# Patient Record
Sex: FEMALE | Race: WHITE | Employment: FULL TIME
[De-identification: names, ages, dates, MRNs, and addresses within clinical notes are randomized per-mention and may not be internally consistent; named-entity substitution may affect disease eponyms.]

---
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
## 2018-01-11 ENCOUNTER — HOSPITAL ENCOUNTER (EMERGENCY)
Age: 18
Discharge: HOME | End: 2018-01-11
Attending: EMERGENCY MEDICINE
Payer: MEDICAID

## 2018-01-11 VITALS
OXYGEN SATURATION: 98 % | WEIGHT: 153 LBS | TEMPERATURE: 98.7 F | HEART RATE: 95 BPM | BODY MASS INDEX: 26.26 KG/M2 | SYSTOLIC BLOOD PRESSURE: 118 MMHG | DIASTOLIC BLOOD PRESSURE: 67 MMHG | RESPIRATION RATE: 16 BRPM

## 2018-01-11 DIAGNOSIS — R10.9 ABDOMINAL CRAMPING: Primary | ICD-10-CM

## 2018-01-11 LAB
-: ABNORMAL
ABSOLUTE EOS #: 0.05 K/UL (ref 0–0.44)
ABSOLUTE IMMATURE GRANULOCYTE: <0.03 K/UL (ref 0–0.3)
ABSOLUTE LYMPH #: 1.6 K/UL (ref 1.2–5.2)
ABSOLUTE MONO #: 0.55 K/UL (ref 0.1–1.4)
ALBUMIN SERPL-MCNC: 4 G/DL (ref 3.5–5.2)
ALBUMIN/GLOBULIN RATIO: 1.2 (ref 1–2.5)
ALP BLD-CCNC: 69 U/L (ref 35–104)
ALT SERPL-CCNC: 18 U/L (ref 5–33)
AMORPHOUS: ABNORMAL
ANION GAP SERPL CALCULATED.3IONS-SCNC: 14 MMOL/L (ref 9–17)
AST SERPL-CCNC: 20 U/L
BACTERIA: ABNORMAL
BASOPHILS # BLD: 0 % (ref 0–2)
BASOPHILS ABSOLUTE: <0.03 K/UL (ref 0–0.2)
BILIRUB SERPL-MCNC: 0.3 MG/DL (ref 0.3–1.2)
BILIRUBIN URINE: NEGATIVE
BUN BLDV-MCNC: 11 MG/DL (ref 6–20)
BUN/CREAT BLD: ABNORMAL (ref 9–20)
C TRACH DNA GENITAL QL NAA+PROBE: NEGATIVE
CALCIUM SERPL-MCNC: 9.8 MG/DL (ref 8.6–10.4)
CASTS UA: ABNORMAL /LPF (ref 0–2)
CHLORIDE BLD-SCNC: 101 MMOL/L (ref 98–107)
CO2: 22 MMOL/L (ref 20–31)
COLOR: YELLOW
CREAT SERPL-MCNC: 0.72 MG/DL (ref 0.5–0.9)
CRYSTALS, UA: ABNORMAL /HPF
DIFFERENTIAL TYPE: ABNORMAL
DIRECT EXAM: NORMAL
EOSINOPHILS RELATIVE PERCENT: 1 % (ref 1–4)
EPITHELIAL CELLS UA: ABNORMAL /HPF (ref 0–5)
GFR AFRICAN AMERICAN: ABNORMAL ML/MIN
GFR NON-AFRICAN AMERICAN: ABNORMAL ML/MIN
GFR SERPL CREATININE-BSD FRML MDRD: ABNORMAL ML/MIN/{1.73_M2}
GFR SERPL CREATININE-BSD FRML MDRD: ABNORMAL ML/MIN/{1.73_M2}
GLUCOSE BLD-MCNC: 83 MG/DL (ref 70–99)
GLUCOSE URINE: NEGATIVE
HCG QUALITATIVE: NEGATIVE
HCT VFR BLD CALC: 38.2 % (ref 36.3–47.1)
HEMOGLOBIN: 12.8 G/DL (ref 11.9–15.1)
IMMATURE GRANULOCYTES: 0 %
KETONES, URINE: NEGATIVE
LEUKOCYTE ESTERASE, URINE: NEGATIVE
LIPASE: 20 U/L (ref 13–60)
LYMPHOCYTES # BLD: 30 % (ref 25–45)
Lab: NORMAL
MCH RBC QN AUTO: 29.4 PG (ref 25.2–33.5)
MCHC RBC AUTO-ENTMCNC: 33.5 G/DL (ref 28.4–34.8)
MCV RBC AUTO: 87.6 FL (ref 82.6–102.9)
MONOCYTES # BLD: 10 % (ref 2–8)
MUCUS: ABNORMAL
N. GONORRHOEAE DNA: ABNORMAL
NITRITE, URINE: NEGATIVE
OTHER OBSERVATIONS UA: ABNORMAL
PDW BLD-RTO: 13.5 % (ref 11.8–14.4)
PH UA: 5 (ref 5–8)
PLATELET # BLD: 210 K/UL (ref 138–453)
PLATELET ESTIMATE: ABNORMAL
PMV BLD AUTO: 11 FL (ref 8.1–13.5)
POTASSIUM SERPL-SCNC: 3.3 MMOL/L (ref 3.7–5.3)
PROTEIN UA: NEGATIVE
RBC # BLD: 4.36 M/UL (ref 3.95–5.11)
RBC # BLD: ABNORMAL 10*6/UL
RBC UA: ABNORMAL /HPF (ref 0–2)
RENAL EPITHELIAL, UA: ABNORMAL /HPF
SEG NEUTROPHILS: 59 % (ref 34–64)
SEGMENTED NEUTROPHILS ABSOLUTE COUNT: 3.06 K/UL (ref 1.8–8)
SODIUM BLD-SCNC: 137 MMOL/L (ref 135–144)
SPECIFIC GRAVITY UA: 1.03 (ref 1–1.03)
SPECIMEN DESCRIPTION: NORMAL
STATUS: NORMAL
TOTAL PROTEIN: 7.3 G/DL (ref 6.4–8.3)
TRICHOMONAS: ABNORMAL
TURBIDITY: CLEAR
URINE HGB: NEGATIVE
UROBILINOGEN, URINE: NORMAL
WBC # BLD: 5.3 K/UL (ref 4.5–13.5)
WBC # BLD: ABNORMAL 10*3/UL
WBC UA: ABNORMAL /HPF (ref 0–5)
YEAST: ABNORMAL

## 2018-01-11 PROCEDURE — 99284 EMERGENCY DEPT VISIT MOD MDM: CPT

## 2018-01-11 PROCEDURE — 2580000003 HC RX 258: Performed by: EMERGENCY MEDICINE

## 2018-01-11 PROCEDURE — 81001 URINALYSIS AUTO W/SCOPE: CPT

## 2018-01-11 PROCEDURE — 80053 COMPREHEN METABOLIC PANEL: CPT

## 2018-01-11 PROCEDURE — 87660 TRICHOMONAS VAGIN DIR PROBE: CPT

## 2018-01-11 PROCEDURE — 87480 CANDIDA DNA DIR PROBE: CPT

## 2018-01-11 PROCEDURE — 84703 CHORIONIC GONADOTROPIN ASSAY: CPT

## 2018-01-11 PROCEDURE — 87591 N.GONORRHOEAE DNA AMP PROB: CPT

## 2018-01-11 PROCEDURE — 87491 CHLMYD TRACH DNA AMP PROBE: CPT

## 2018-01-11 PROCEDURE — 87086 URINE CULTURE/COLONY COUNT: CPT

## 2018-01-11 PROCEDURE — 87510 GARDNER VAG DNA DIR PROBE: CPT

## 2018-01-11 PROCEDURE — 83690 ASSAY OF LIPASE: CPT

## 2018-01-11 PROCEDURE — 85025 COMPLETE CBC W/AUTO DIFF WBC: CPT

## 2018-01-11 RX ORDER — 0.9 % SODIUM CHLORIDE 0.9 %
1000 INTRAVENOUS SOLUTION INTRAVENOUS ONCE
Status: COMPLETED | OUTPATIENT
Start: 2018-01-11 | End: 2018-01-11

## 2018-01-11 RX ORDER — ONDANSETRON 2 MG/ML
4 INJECTION INTRAMUSCULAR; INTRAVENOUS ONCE
Status: DISCONTINUED | OUTPATIENT
Start: 2018-01-11 | End: 2018-01-11 | Stop reason: HOSPADM

## 2018-01-11 RX ADMIN — SODIUM CHLORIDE 1000 ML: 9 INJECTION, SOLUTION INTRAVENOUS at 04:00

## 2018-01-11 ASSESSMENT — PAIN DESCRIPTION - PAIN TYPE: TYPE: ACUTE PAIN

## 2018-01-11 ASSESSMENT — PAIN DESCRIPTION - ORIENTATION: ORIENTATION: RIGHT;LEFT;LOWER

## 2018-01-11 ASSESSMENT — ENCOUNTER SYMPTOMS
DIARRHEA: 0
VOMITING: 1
BLOOD IN STOOL: 0
CONSTIPATION: 0
BACK PAIN: 0
NAUSEA: 1
COUGH: 0
ABDOMINAL PAIN: 1
SORE THROAT: 0
SHORTNESS OF BREATH: 0

## 2018-01-11 ASSESSMENT — PAIN SCALES - GENERAL: PAINLEVEL_OUTOF10: 5

## 2018-01-11 ASSESSMENT — PAIN DESCRIPTION - FREQUENCY: FREQUENCY: INTERMITTENT

## 2018-01-11 ASSESSMENT — PAIN DESCRIPTION - DESCRIPTORS: DESCRIPTORS: ACHING;CRAMPING

## 2018-01-11 ASSESSMENT — PAIN DESCRIPTION - ONSET: ONSET: ON-GOING

## 2018-01-11 ASSESSMENT — PAIN DESCRIPTION - LOCATION: LOCATION: ABDOMEN

## 2018-01-11 NOTE — ED NOTES
PT sleeping in bed. RR even and unlabored. NAD noted. Will continue to monitor.        Derick Stocko, RN  01/11/18 9921

## 2018-01-11 NOTE — ED PROVIDER NOTES
REVIEWED-NO CLINICALLY SIGNIFICANT ABNORMALITIES. IMP-ABD PAIN, INTERMITTENT, RESOLVED. PLAN-DISCHARGE. F/U WITH PHYSICIAN OR CLINIC OF CHOICE FROM LIST PROVIDED. RETURN IF SX WORSEN OR PROGRESS.              Arti Cassidy MD  01/11/18 0328
female that presents with bilateral lower abdominal cramping for the past 2 weeks associated with mild nausea and occasional vomiting. Denies any fever or chills. Denies any hematuria, dysuria, vaginal bleeding. She states she has no straight vaginal discharge. Last menstrual period over one month ago. On exam patient is well-appearing, afebrile, nontoxic. Lungs are clear bilaterally. Abdomen soft nontender. Pelvic exam performed with Francisco Pitt RN and exam for entire procedure. Pelvic exam showing yellowish-white vaginal discharge, no gross bleeding, digital exam showing no cervical motion tenderness and no adnexal tenderness and no masses. Impression is possibly vaginitis or gonorrhea chlamydia or pregnancy, we'll get vaginitis probes, gonorrhea and chlamydia swabs, pregnancy test, CBC, CMP and lipase as well as urinalysis. Plan for IV fluids and Zofran and will reassess. 5:31 AM  UA negative, Vaginitis negative. Gc/chlamydia pending, patient electing not to be treated for this. Patient will follow up on results and provided with clinic list for PCP follow up. Instructed to return if symptoms worsen. Patient verbalized understanding and in agreement with plan. PROCEDURES:  None    CONSULTS:  None    CRITICAL CARE:  None    FINAL IMPRESSION      1. Abdominal cramping          DISPOSITION / PLAN     DISPOSITION  Decision to discharge      PATIENT REFERRED TO:  [facility]  [address] [address]  [phone]    If symptoms worsen      DISCHARGE MEDICATIONS:  New Prescriptions    No medications on file       Oneyda Pollack MD  Emergency Medicine Resident    (Please note that portions of this note were completed with a voice recognition program.  Efforts were made to edit the dictations but occasionally words are mis-transcribed. )        Oneyda Pollack MD  Resident  01/12/18 7472

## 2018-01-12 LAB
CULTURE: NORMAL
CULTURE: NORMAL
Lab: NORMAL
SPECIMEN DESCRIPTION: NORMAL
STATUS: NORMAL

## 2018-01-25 ENCOUNTER — HOSPITAL ENCOUNTER (EMERGENCY)
Age: 18
Discharge: HOME | End: 2018-01-25
Attending: EMERGENCY MEDICINE
Payer: MEDICAID

## 2018-01-25 VITALS
SYSTOLIC BLOOD PRESSURE: 120 MMHG | WEIGHT: 150 LBS | BODY MASS INDEX: 25.75 KG/M2 | HEART RATE: 76 BPM | RESPIRATION RATE: 16 BRPM | TEMPERATURE: 96.6 F | OXYGEN SATURATION: 99 % | DIASTOLIC BLOOD PRESSURE: 68 MMHG

## 2018-01-25 DIAGNOSIS — H60.501 ACUTE OTITIS EXTERNA OF RIGHT EAR, UNSPECIFIED TYPE: Primary | ICD-10-CM

## 2018-01-25 DIAGNOSIS — H73.011 BULLOUS MYRINGITIS OF RIGHT EAR: ICD-10-CM

## 2018-01-25 PROCEDURE — 99283 EMERGENCY DEPT VISIT LOW MDM: CPT

## 2018-01-25 PROCEDURE — 6370000000 HC RX 637 (ALT 250 FOR IP): Performed by: EMERGENCY MEDICINE

## 2018-01-25 RX ORDER — AMOXICILLIN 500 MG/1
500 CAPSULE ORAL 3 TIMES DAILY
Qty: 30 CAPSULE | Refills: 0 | Status: SHIPPED | OUTPATIENT
Start: 2018-01-25 | End: 2018-02-04

## 2018-01-25 RX ORDER — IBUPROFEN 800 MG/1
800 TABLET ORAL ONCE
Status: COMPLETED | OUTPATIENT
Start: 2018-01-25 | End: 2018-01-25

## 2018-01-25 RX ORDER — IBUPROFEN 600 MG/1
600 TABLET ORAL EVERY 6 HOURS PRN
Qty: 30 TABLET | Refills: 0 | Status: SHIPPED | OUTPATIENT
Start: 2018-01-25 | End: 2018-07-18 | Stop reason: ALTCHOICE

## 2018-01-25 RX ORDER — AMOXICILLIN 250 MG/1
500 CAPSULE ORAL ONCE
Status: COMPLETED | OUTPATIENT
Start: 2018-01-25 | End: 2018-01-25

## 2018-01-25 RX ORDER — CIPROFLOXACIN AND DEXAMETHASONE 3; 1 MG/ML; MG/ML
4 SUSPENSION/ DROPS AURICULAR (OTIC) ONCE
Status: COMPLETED | OUTPATIENT
Start: 2018-01-25 | End: 2018-01-25

## 2018-01-25 RX ADMIN — CIPROFLOXACIN AND DEXAMETHASONE 4 DROP: 3; 1 SUSPENSION/ DROPS AURICULAR (OTIC) at 08:31

## 2018-01-25 RX ADMIN — AMOXICILLIN 500 MG: 250 CAPSULE ORAL at 08:31

## 2018-01-25 RX ADMIN — IBUPROFEN 800 MG: 800 TABLET, FILM COATED ORAL at 08:31

## 2018-01-25 ASSESSMENT — PAIN DESCRIPTION - LOCATION: LOCATION: EAR

## 2018-01-25 ASSESSMENT — ENCOUNTER SYMPTOMS
NAUSEA: 0
ABDOMINAL PAIN: 0
VOMITING: 0
COUGH: 1
SORE THROAT: 1
RHINORRHEA: 1
TROUBLE SWALLOWING: 0
VOICE CHANGE: 0
SHORTNESS OF BREATH: 0

## 2018-01-25 ASSESSMENT — PAIN DESCRIPTION - PROGRESSION: CLINICAL_PROGRESSION: GRADUALLY WORSENING

## 2018-01-25 ASSESSMENT — PAIN DESCRIPTION - FREQUENCY: FREQUENCY: CONTINUOUS

## 2018-01-25 ASSESSMENT — PAIN SCALES - GENERAL
PAINLEVEL_OUTOF10: 10
PAINLEVEL_OUTOF10: 10

## 2018-01-25 ASSESSMENT — PAIN DESCRIPTION - PAIN TYPE: TYPE: ACUTE PAIN

## 2018-01-25 ASSESSMENT — PAIN DESCRIPTION - ORIENTATION: ORIENTATION: RIGHT

## 2018-01-25 ASSESSMENT — PAIN DESCRIPTION - ONSET: ONSET: GRADUAL

## 2018-01-25 NOTE — ED PROVIDER NOTES
[facility]     Emergency Department     Faculty Attestation    I performed a history and physical examination of the patient and discussed management with the resident. I reviewed the residents note and agree with the documented findings and plan of care. Any areas of disagreement are noted on the chart. I was personally present for the key portions of any procedures. I have documented in the chart those procedures where I was not present during the key portions. I have reviewed the emergency nurses triage note. I agree with the chief complaint, past medical history, past surgical history, allergies, medications, social and family history as documented unless otherwise noted below. For Physician Assistant/ Nurse Practitioner cases/documentation I have personally evaluated this patient and have completed at least one if not all key elements of the E/M (history, physical exam, and MDM). Additional findings are as noted. Primary Care Physician:  No primary care provider on file.     CHIEF COMPLAINT       Chief Complaint   Patient presents with    Otalgia       RECENT VITALS:   Temp: 96.6 °F (35.9 °C),  Heart Rate: 76, Resp: 16,      LABS:  Labs Reviewed - No data to display      PERTINENT ATTENDING PHYSICIAN COMMENTS:    Patient with right ear pain exam consistent with otitis media and bullous type she also has some tenderness to the tragus itself is no tenderness over the mastoid     [address],  MD, [facility]  Attending Emergency  Physician              Warden Dale MD  01/25/18 7191

## 2018-07-18 ENCOUNTER — HOSPITAL ENCOUNTER (EMERGENCY)
Age: 18
Discharge: HOME | End: 2018-07-18
Attending: EMERGENCY MEDICINE
Payer: MEDICAID

## 2018-07-18 VITALS
DIASTOLIC BLOOD PRESSURE: 64 MMHG | HEIGHT: 63 IN | RESPIRATION RATE: 18 BRPM | TEMPERATURE: 98.2 F | SYSTOLIC BLOOD PRESSURE: 106 MMHG | HEART RATE: 77 BPM | OXYGEN SATURATION: 99 % | BODY MASS INDEX: 26.58 KG/M2 | WEIGHT: 150 LBS

## 2018-07-18 DIAGNOSIS — N89.8 VAGINAL DISCHARGE: Primary | ICD-10-CM

## 2018-07-18 DIAGNOSIS — B96.89 BV (BACTERIAL VAGINOSIS): ICD-10-CM

## 2018-07-18 DIAGNOSIS — N76.0 BV (BACTERIAL VAGINOSIS): ICD-10-CM

## 2018-07-18 DIAGNOSIS — Z20.2 POSSIBLE EXPOSURE TO STD: ICD-10-CM

## 2018-07-18 LAB
BILIRUBIN URINE: NEGATIVE
COLOR: YELLOW
COMMENT UA: NORMAL
DIRECT EXAM: ABNORMAL
GLUCOSE URINE: NEGATIVE
HCG(URINE) PREGNANCY TEST: NEGATIVE
KETONES, URINE: NEGATIVE
LEUKOCYTE ESTERASE, URINE: NEGATIVE
Lab: ABNORMAL
NITRITE, URINE: NEGATIVE
PH UA: 6 (ref 5–8)
PROTEIN UA: NEGATIVE
SPECIFIC GRAVITY UA: 1.03 (ref 1–1.03)
SPECIMEN DESCRIPTION: ABNORMAL
STATUS: ABNORMAL
TURBIDITY: CLEAR
URINE HGB: NEGATIVE
UROBILINOGEN, URINE: NORMAL

## 2018-07-18 PROCEDURE — 96372 THER/PROPH/DIAG INJ SC/IM: CPT

## 2018-07-18 PROCEDURE — 87591 N.GONORRHOEAE DNA AMP PROB: CPT

## 2018-07-18 PROCEDURE — 84703 CHORIONIC GONADOTROPIN ASSAY: CPT

## 2018-07-18 PROCEDURE — 87491 CHLMYD TRACH DNA AMP PROBE: CPT

## 2018-07-18 PROCEDURE — 87660 TRICHOMONAS VAGIN DIR PROBE: CPT

## 2018-07-18 PROCEDURE — 87480 CANDIDA DNA DIR PROBE: CPT

## 2018-07-18 PROCEDURE — 99283 EMERGENCY DEPT VISIT LOW MDM: CPT

## 2018-07-18 PROCEDURE — 81003 URINALYSIS AUTO W/O SCOPE: CPT

## 2018-07-18 PROCEDURE — 6370000000 HC RX 637 (ALT 250 FOR IP): Performed by: PHYSICIAN ASSISTANT

## 2018-07-18 PROCEDURE — 6360000002 HC RX W HCPCS: Performed by: PHYSICIAN ASSISTANT

## 2018-07-18 PROCEDURE — 87510 GARDNER VAG DNA DIR PROBE: CPT

## 2018-07-18 RX ORDER — CEFTRIAXONE SODIUM 250 MG/1
250 INJECTION, POWDER, FOR SOLUTION INTRAMUSCULAR; INTRAVENOUS ONCE
Status: COMPLETED | OUTPATIENT
Start: 2018-07-18 | End: 2018-07-18

## 2018-07-18 RX ORDER — METRONIDAZOLE 500 MG/1
500 TABLET ORAL 2 TIMES DAILY
Qty: 14 TABLET | Refills: 0 | Status: SHIPPED | OUTPATIENT
Start: 2018-07-18 | End: 2018-07-25

## 2018-07-18 RX ORDER — AZITHROMYCIN 250 MG/1
1000 TABLET, FILM COATED ORAL ONCE
Status: COMPLETED | OUTPATIENT
Start: 2018-07-18 | End: 2018-07-18

## 2018-07-18 RX ADMIN — CEFTRIAXONE SODIUM 250 MG: 250 INJECTION, POWDER, FOR SOLUTION INTRAMUSCULAR; INTRAVENOUS at 16:33

## 2018-07-18 RX ADMIN — AZITHROMYCIN 1000 MG: 250 TABLET, FILM COATED ORAL at 16:33

## 2018-07-18 ASSESSMENT — PAIN SCALES - GENERAL: PAINLEVEL_OUTOF10: 7

## 2018-07-18 ASSESSMENT — ENCOUNTER SYMPTOMS
SORE THROAT: 0
EYE PAIN: 0
BACK PAIN: 0
EYE DISCHARGE: 0
NAUSEA: 0
EYE ITCHING: 0
WHEEZING: 0
COUGH: 0
ABDOMINAL PAIN: 0
RHINORRHEA: 0
COLOR CHANGE: 0
VOMITING: 0

## 2018-07-18 ASSESSMENT — PAIN DESCRIPTION - ORIENTATION: ORIENTATION: LOWER;MID

## 2018-07-18 ASSESSMENT — PAIN DESCRIPTION - DESCRIPTORS: DESCRIPTORS: SHARP

## 2018-07-18 ASSESSMENT — PAIN DESCRIPTION - LOCATION: LOCATION: ABDOMEN

## 2018-07-18 ASSESSMENT — PAIN DESCRIPTION - FREQUENCY: FREQUENCY: INTERMITTENT

## 2018-07-18 NOTE — ED PROVIDER NOTES
[facility]  ED  eMERGENCY dEPARTMENT eNCOUnter   [facility]     Pt Name: Jose Marie  MRN: [id number]  [place] 2000  Date of evaluation: 7/18/18       Jose Marie is a 21 y.o. female who presents with Vaginal Discharge (reports white vaginal discharge x 1 week)      Vitals:   Vitals:    07/18/18 1406 07/18/18 1407   BP:  106/64   Pulse: 77    Resp: 18    Temp: 98.2 °F (36.8 °C)    TempSrc: Oral    SpO2: 99%    Weight: 150 lb (68 kg)    Height: 5' 3\" (1.6 m)        Impression: No diagnosis found. I was personally available for consultation in the Emergency Department. I have reviewed the chart and agree with the documentation as recorded by the [facility] AND [facility], including the assessment, treatment plan and disposition.     Betsey Leon MD  Attending Emergency  Physician              Betsey Leon MD  07/18/18 8044

## 2018-07-18 NOTE — ED NOTES
Writer provided patient information on [facility] JOSE Umanzor, Autumn.       Claudia Ferrera, SAMANTHA, LSW  07/18/18 1522

## 2018-07-18 NOTE — ED NOTES
Discharge instructions given. Verbalized understanding.   No distress noted     Milagro Small, JJ  07/18/18 2913

## 2018-07-19 LAB
C TRACH DNA GENITAL QL NAA+PROBE: NEGATIVE
N. GONORRHOEAE DNA: NEGATIVE

## 2018-07-19 NOTE — ED PROVIDER NOTES
[facility] ED  Emergency Department Encounter  Mid Level Provider     Pt Name: Ruthe Bosworth  MRN: [id number]  [place] 2000  Date of evaluation: 7/18/18  PCP:  No primary care provider on file. CHIEF COMPLAINT       Chief Complaint   Patient presents with    Vaginal Discharge     reports white vaginal discharge x 1 week       HISTORY OF PRESENT ILLNESS  (Location/Symptom, Timing/Onset, Context/Setting, Quality, Duration, Modifying Factors, Severity.)      Ruthe Bosworth is a 21 y.o. female who presents with White vaginal discharge. Patient reports that she has a concern for sexual transmitted disease. Patient reports that she has one male sexual partner but states that her partner may have other partners. She denies any dysuria urgency or frequency. She denies any recent antibiotic use. She has not had any fevers or chills. She denies any abdominal pain. She reports that her last menstrual cycle was at the end of June and regular and on time for her. PAST MEDICAL / SURGICAL / SOCIAL / FAMILY HISTORY      has a past medical history of Pelvic inflammatory disease. has a past surgical history that includes fracture surgery; Tympanostomy tube placement; Tonsillectomy; Adenoidectomy; and Appendectomy. Social History     Social History    Marital status: Single     Spouse name: N/A    Number of children: N/A    Years of education: N/A     Occupational History    Not on file. Social History Main Topics    Smoking status: Never Smoker    Smokeless tobacco: Never Used    Alcohol use No    Drug use: No    Sexual activity: Yes     Partners: Male     Other Topics Concern    Not on file     Social History Narrative    No narrative on file       History reviewed. No pertinent family history. Allergies:  Patient has no known allergies. Home Medications:  Prior to Admission medications    Medication Sig Start Date End Date Taking?  Authorizing Provider NEGATIVE for Candida sp. Direct Exam POSITIVE for Gardnerella vaginalis. (A)     Direct Exam NEGATIVE for Trichomonas vaginalis     Direct Exam       Method of testing is a DNA probe intended for detection and identification of    Direct Exam        Candida species, Gardnerella vaginalis, and Trichomonas vaginalis nucleic acid    Direct Exam        in vaginal fluid specimens from patients with symptoms of vaginitis/vaginosis. Status FINAL 07/18/2018    Urinalysis   Result Value Ref Range    Color, UA YELLOW YEL    Turbidity UA CLEAR CLEAR    Glucose, Ur NEGATIVE NEG    Bilirubin Urine NEGATIVE NEG    Ketones, Urine NEGATIVE NEG    Specific Gravity, UA 1.026 1.005 - 1.030    Urine Hgb NEGATIVE NEG    pH, UA 6.0 5.0 - 8.0    Protein, UA NEGATIVE NEG    Urobilinogen, Urine Normal NORM    Nitrite, Urine NEGATIVE NEG    Leukocyte Esterase, Urine NEGATIVE NEG    Urinalysis Comments       Microscopic exam not performed based on chemical results unless requested in   Pregnancy, Urine   Result Value Ref Range    HCG(Urine) Pregnancy Test NEGATIVE NEG           FINAL IMPRESSION      1. Vaginal discharge    2.  Possible exposure to STD    3. BV (bacterial vaginosis)          DISPOSITION / PLAN     DISPOSITION Decision To Discharge    PATIENT REFERRED TO:  [facility] [address] [address] [phone]  Schedule an appointment as soon as possible for a visit         DISCHARGE MEDICATIONS:  Discharge Medication List as of 7/18/2018  4:27 PM      START taking these medications    Details   metroNIDAZOLE (FLAGYL) 500 MG tablet Take 1 tablet by mouth 2 times daily for 7 days, Disp-14 tablet, R-0Normal             Shadia Michel PA-C   Emergency Medicine Physician Assistant    (Please note that portions of this note were completed with a voice recognition program.  Efforts were made to edit the dictations but occasionally words are mis-transcribed.)       Shadia Michel

## 2018-09-26 PROBLEM — J10.1 INFLUENZA A: Status: RESOLVED | Noted: 2017-05-27 | Resolved: 2018-09-26

## 2019-08-07 ENCOUNTER — APPOINTMENT (OUTPATIENT)
Dept: ULTRASOUND IMAGING | Age: 19
DRG: 566 | End: 2019-08-07
Payer: MEDICAID

## 2019-08-07 ENCOUNTER — HOSPITAL ENCOUNTER (INPATIENT)
Age: 19
LOS: 1 days | Discharge: HOME | DRG: 566 | End: 2019-08-08
Attending: EMERGENCY MEDICINE | Admitting: OBSTETRICS & GYNECOLOGY
Payer: MEDICAID

## 2019-08-07 DIAGNOSIS — N73.0 PID (ACUTE PELVIC INFLAMMATORY DISEASE): Primary | ICD-10-CM

## 2019-08-07 DIAGNOSIS — O26.859 SPOTTING IN EARLY PREGNANCY: ICD-10-CM

## 2019-08-07 LAB
-: ABNORMAL
ABSOLUTE EOS #: <0.03 K/UL (ref 0–0.44)
ABSOLUTE IMMATURE GRANULOCYTE: 0.05 K/UL (ref 0–0.3)
ABSOLUTE LYMPH #: 1.19 K/UL (ref 1.2–5.2)
ABSOLUTE MONO #: 0.66 K/UL (ref 0.1–1.4)
ALBUMIN SERPL-MCNC: 4.3 G/DL (ref 3.5–5.2)
ALBUMIN/GLOBULIN RATIO: 1.3 (ref 1–2.5)
ALP BLD-CCNC: 63 U/L (ref 35–104)
ALT SERPL-CCNC: 10 U/L (ref 5–33)
AMORPHOUS: ABNORMAL
ANION GAP SERPL CALCULATED.3IONS-SCNC: 13 MMOL/L (ref 9–17)
AST SERPL-CCNC: 15 U/L
BACTERIA: ABNORMAL
BASOPHILS # BLD: 0 % (ref 0–2)
BASOPHILS ABSOLUTE: 0.03 K/UL (ref 0–0.2)
BILIRUB SERPL-MCNC: 0.45 MG/DL (ref 0.3–1.2)
BILIRUBIN URINE: NEGATIVE
BUN BLDV-MCNC: 7 MG/DL (ref 6–20)
BUN/CREAT BLD: ABNORMAL (ref 9–20)
CALCIUM SERPL-MCNC: 9.5 MG/DL (ref 8.6–10.4)
CASTS UA: ABNORMAL /LPF (ref 0–8)
CHLORIDE BLD-SCNC: 97 MMOL/L (ref 98–107)
CO2: 23 MMOL/L (ref 20–31)
COLOR: YELLOW
CREAT SERPL-MCNC: 0.57 MG/DL (ref 0.5–0.9)
CRYSTALS, UA: ABNORMAL /HPF
DIFFERENTIAL TYPE: ABNORMAL
DIRECT EXAM: ABNORMAL
EOSINOPHILS RELATIVE PERCENT: 0 % (ref 1–4)
EPITHELIAL CELLS UA: ABNORMAL /HPF (ref 0–5)
GFR AFRICAN AMERICAN: >60 ML/MIN
GFR NON-AFRICAN AMERICAN: >60 ML/MIN
GFR SERPL CREATININE-BSD FRML MDRD: ABNORMAL ML/MIN/{1.73_M2}
GFR SERPL CREATININE-BSD FRML MDRD: ABNORMAL ML/MIN/{1.73_M2}
GLUCOSE BLD-MCNC: 89 MG/DL (ref 70–99)
GLUCOSE URINE: NEGATIVE
HCG QUANTITATIVE: ABNORMAL IU/L
HCG(URINE) PREGNANCY TEST: POSITIVE
HCT VFR BLD CALC: 39.2 % (ref 36.3–47.1)
HEMOGLOBIN: 13.4 G/DL (ref 11.9–15.1)
IMMATURE GRANULOCYTES: 0 %
KETONES, URINE: NEGATIVE
LEUKOCYTE ESTERASE, URINE: ABNORMAL
LYMPHOCYTES # BLD: 10 % (ref 25–45)
Lab: ABNORMAL
MCH RBC QN AUTO: 32.1 PG (ref 25.2–33.5)
MCHC RBC AUTO-ENTMCNC: 34.2 G/DL (ref 28.4–34.8)
MCV RBC AUTO: 94 FL (ref 82.6–102.9)
MONOCYTES # BLD: 5 % (ref 2–8)
MUCUS: ABNORMAL
NITRITE, URINE: NEGATIVE
NRBC AUTOMATED: 0 PER 100 WBC
OTHER OBSERVATIONS UA: ABNORMAL
PDW BLD-RTO: 13 % (ref 11.8–14.4)
PH UA: 7.5 (ref 5–8)
PLATELET # BLD: 190 K/UL (ref 138–453)
PLATELET ESTIMATE: ABNORMAL
PMV BLD AUTO: 11.1 FL (ref 8.1–13.5)
POTASSIUM SERPL-SCNC: 4.2 MMOL/L (ref 3.7–5.3)
PROTEIN UA: NEGATIVE
RBC # BLD: 4.17 M/UL (ref 3.95–5.11)
RBC # BLD: ABNORMAL 10*6/UL
RBC UA: ABNORMAL /HPF (ref 0–4)
RENAL EPITHELIAL, UA: ABNORMAL /HPF
SEG NEUTROPHILS: 85 % (ref 34–64)
SEGMENTED NEUTROPHILS ABSOLUTE COUNT: 10.52 K/UL (ref 1.8–8)
SODIUM BLD-SCNC: 133 MMOL/L (ref 135–144)
SPECIFIC GRAVITY UA: 1.02 (ref 1–1.03)
SPECIMEN DESCRIPTION: ABNORMAL
TOTAL PROTEIN: 7.7 G/DL (ref 6.4–8.3)
TRICHOMONAS: ABNORMAL
TURBIDITY: CLEAR
URINE HGB: ABNORMAL
UROBILINOGEN, URINE: NORMAL
WBC # BLD: 12.5 K/UL (ref 4.5–13.5)
WBC # BLD: ABNORMAL 10*3/UL
WBC UA: ABNORMAL /HPF (ref 0–5)
YEAST: ABNORMAL

## 2019-08-07 PROCEDURE — 6370000000 HC RX 637 (ALT 250 FOR IP): Performed by: STUDENT IN AN ORGANIZED HEALTH CARE EDUCATION/TRAINING PROGRAM

## 2019-08-07 PROCEDURE — 81025 URINE PREGNANCY TEST: CPT

## 2019-08-07 PROCEDURE — 76817 TRANSVAGINAL US OBSTETRIC: CPT

## 2019-08-07 PROCEDURE — 87491 CHLMYD TRACH DNA AMP PROBE: CPT

## 2019-08-07 PROCEDURE — 2580000003 HC RX 258: Performed by: STUDENT IN AN ORGANIZED HEALTH CARE EDUCATION/TRAINING PROGRAM

## 2019-08-07 PROCEDURE — 87660 TRICHOMONAS VAGIN DIR PROBE: CPT

## 2019-08-07 PROCEDURE — 87591 N.GONORRHOEAE DNA AMP PROB: CPT

## 2019-08-07 PROCEDURE — 96361 HYDRATE IV INFUSION ADD-ON: CPT

## 2019-08-07 PROCEDURE — 1200000000 HC SEMI PRIVATE

## 2019-08-07 PROCEDURE — 80053 COMPREHEN METABOLIC PANEL: CPT

## 2019-08-07 PROCEDURE — 81001 URINALYSIS AUTO W/SCOPE: CPT

## 2019-08-07 PROCEDURE — 99285 EMERGENCY DEPT VISIT HI MDM: CPT

## 2019-08-07 PROCEDURE — 87480 CANDIDA DNA DIR PROBE: CPT

## 2019-08-07 PROCEDURE — 84702 CHORIONIC GONADOTROPIN TEST: CPT

## 2019-08-07 PROCEDURE — 87086 URINE CULTURE/COLONY COUNT: CPT

## 2019-08-07 PROCEDURE — 85025 COMPLETE CBC W/AUTO DIFF WBC: CPT

## 2019-08-07 PROCEDURE — 87510 GARDNER VAG DNA DIR PROBE: CPT

## 2019-08-07 PROCEDURE — G0378 HOSPITAL OBSERVATION PER HR: HCPCS

## 2019-08-07 RX ORDER — METRONIDAZOLE 500 MG/1
500 TABLET ORAL 2 TIMES DAILY
Status: DISCONTINUED | OUTPATIENT
Start: 2019-08-07 | End: 2019-08-08 | Stop reason: HOSPADM

## 2019-08-07 RX ORDER — DOXYCYCLINE HYCLATE 100 MG
100 TABLET ORAL EVERY 12 HOURS SCHEDULED
Status: CANCELLED | OUTPATIENT
Start: 2019-08-07

## 2019-08-07 RX ORDER — 0.9 % SODIUM CHLORIDE 0.9 %
1000 INTRAVENOUS SOLUTION INTRAVENOUS ONCE
Status: COMPLETED | OUTPATIENT
Start: 2019-08-07 | End: 2019-08-07

## 2019-08-07 RX ORDER — ACETAMINOPHEN 325 MG/1
650 TABLET ORAL ONCE
Status: COMPLETED | OUTPATIENT
Start: 2019-08-07 | End: 2019-08-07

## 2019-08-07 RX ADMIN — SODIUM CHLORIDE 1000 ML: 9 INJECTION, SOLUTION INTRAVENOUS at 19:41

## 2019-08-07 RX ADMIN — METRONIDAZOLE 500 MG: 500 TABLET ORAL at 23:06

## 2019-08-07 RX ADMIN — ACETAMINOPHEN 650 MG: 325 TABLET ORAL at 20:09

## 2019-08-07 ASSESSMENT — PAIN DESCRIPTION - FREQUENCY: FREQUENCY: CONTINUOUS

## 2019-08-07 ASSESSMENT — PAIN SCALES - GENERAL
PAINLEVEL_OUTOF10: 9
PAINLEVEL_OUTOF10: 9

## 2019-08-07 ASSESSMENT — PAIN DESCRIPTION - DESCRIPTORS: DESCRIPTORS: STABBING

## 2019-08-07 ASSESSMENT — PAIN DESCRIPTION - LOCATION: LOCATION: ABDOMEN;BACK

## 2019-08-07 NOTE — ED PROVIDER NOTES
[facility] ED  Emergency Department Encounter  EmergencyMedicine Resident     Pt Tina Cordero  MRN: [id number]  [place] 2000  Date of evaluation: 8/8/19  PCP:  No primary care provider on file. CHIEF COMPLAINT       Chief Complaint   Patient presents with    Abdominal Pain    Back Pain       HISTORY OF PRESENT ILLNESS  (Location/Symptom, Timing/Onset, Context/Setting, Quality, Duration, Modifying Factors, Severity.)      Amelia Ryan is a 19 y.o. female who presents with significant abdominal pain localized to the right pelvic area, vaginal bleeding since yesterday. Patient complaining of light bleeding. Patient also experiencing yellowish discharge. 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 Patient is G2, P2. She denies taking a pregnancy test.  No dysuria, back pain. She denies any vomiting, fevers, chills, sweats. Blood Type a positive. PAST MEDICAL / SURGICAL / SOCIAL / FAMILY HISTORY      has a past medical history of Pelvic inflammatory disease. has a past surgical history that includes fracture surgery; Tympanostomy tube placement; Tonsillectomy; Adenoidectomy; and Appendectomy.     Social History     Socioeconomic History    Marital status: Single     Spouse name: Not on file    Number of children: Not on file    Years of education: Not on file    Highest education level: Not on file   Occupational History    Not on file   Social Needs    Financial resource strain: Not on file    Food insecurity:     Worry: Not on file     Inability: Not on file    Transportation needs:     Medical: Not on file     Non-medical: Not on file   Tobacco Use    Smoking status: Never Smoker    Smokeless tobacco: Never Used   Substance and Sexual Activity    Alcohol use: No    Drug use: No    Sexual activity: Yes     Partners: Male   Lifestyle    Physical activity:     Days per week: Not on file     Minutes per session: Not on file    Stress: Not on well-nourished. HENT:   Head: Normocephalic and atraumatic. Nose: Nose normal.   Mouth/Throat: Oropharynx is clear and moist.   Eyes: Pupils are equal, round, and reactive to light. EOM are normal.   Neck: Normal range of motion. Neck supple. Cardiovascular: Normal rate, regular rhythm, normal heart sounds and intact distal pulses. Pulmonary/Chest: Breath sounds normal. No respiratory distress. She has no wheezes. She has no rales. Abdominal: Soft. Bowel sounds are normal. She exhibits no distension. There is no tenderness. Genitourinary:   Genitourinary Comments: Yellowish discharge noted at cervical office. Significant right adnexal tenderness and cervical motion tenderness present. No left adnexal tenderness. No herpetic lesions noted on external genitalia   Musculoskeletal: Normal range of motion. She exhibits no edema or deformity. Neurological: She is alert and oriented to person, place, and time. She has normal reflexes. Skin: Skin is warm and dry. No rash noted. No erythema. Psychiatric: She has a normal mood and affect.  Her behavior is normal.       DIFFERENTIAL  DIAGNOSIS     PLAN (LABS / IMAGING / EKG):  Orders Placed This Encounter   Procedures    Urine Culture    VAGINITIS DNA PROBE    C.trachomatis N.gonorrhoeae DNA    US OB TRANSVAGINAL    Urinalysis with Microscopic    PREGNANCY, URINE    HCG, QUANTITATIVE, PREGNANCY    CBC Auto Differential    Comprehensive Metabolic Panel    Vaginal exam    Inpatient consult to Obstetrics / Gynecology    PATIENT STATUS (FROM ED OR OR/PROCEDURAL) Inpatient       MEDICATIONS ORDERED:  Orders Placed This Encounter   Medications    0.9 % sodium chloride bolus    acetaminophen (TYLENOL) tablet 650 mg    metroNIDAZOLE (FLAGYL) tablet 500 mg       DDX: GERD, PUD, pancreatitis, cholecystitis, GB colic, cholangitis, Tswl-Uclg-Olvisg, ACS/ MI, pneumonia, SBO, DKA, AAA, mesenteric ischemia, perforated viscous, acute gastroenteritis, NSAP, Range    hCG Quant 118,435 (H) <5 IU/L   CBC Auto Differential   Result Value Ref Range    WBC 12.5 4.5 - 13.5 k/uL    RBC 4.17 3.95 - 5.11 m/uL    Hemoglobin 13.4 11.9 - 15.1 g/dL    Hematocrit 39.2 36.3 - 47.1 %    MCV 94.0 82.6 - 102.9 fL    MCH 32.1 25.2 - 33.5 pg    MCHC 34.2 28.4 - 34.8 g/dL    RDW 13.0 11.8 - 14.4 %    Platelets 647 974 - 503 k/uL    MPV 11.1 8.1 - 13.5 fL    NRBC Automated 0.0 0.0 per 100 WBC    Differential Type NOT REPORTED     Seg Neutrophils 85 (H) 34 - 64 %    Lymphocytes 10 (L) 25 - 45 %    Monocytes 5 2 - 8 %    Eosinophils % 0 (L) 1 - 4 %    Basophils 0 0 - 2 %    Immature Granulocytes 0 0 %    Segs Absolute 10.52 (H) 1.80 - 8.00 k/uL    Absolute Lymph # 1.19 (L) 1.20 - 5.20 k/uL    Absolute Mono # 0.66 0.10 - 1.40 k/uL    Absolute Eos # <0.03 0.00 - 0.44 k/uL    Basophils # 0.03 0.00 - 0.20 k/uL    Absolute Immature Granulocyte 0.05 0.00 - 0.30 k/uL    WBC Morphology NOT REPORTED     RBC Morphology NOT REPORTED     Platelet Estimate NOT REPORTED    Comprehensive Metabolic Panel   Result Value Ref Range    Glucose 89 70 - 99 mg/dL    BUN 7 6 - 20 mg/dL    CREATININE 0.57 0.50 - 0.90 mg/dL    Bun/Cre Ratio NOT REPORTED 9 - 20    Calcium 9.5 8.6 - 10.4 mg/dL    Sodium 133 (L) 135 - 144 mmol/L    Potassium 4.2 3.7 - 5.3 mmol/L    Chloride 97 (L) 98 - 107 mmol/L    CO2 23 20 - 31 mmol/L    Anion Gap 13 9 - 17 mmol/L    Alkaline Phosphatase 63 35 - 104 U/L    ALT 10 5 - 33 U/L    AST 15 <32 U/L    Total Bilirubin 0.45 0.3 - 1.2 mg/dL    Total Protein 7.7 6.4 - 8.3 g/dL    Alb 4.3 3.5 - 5.2 g/dL    Albumin/Globulin Ratio 1.3 1.0 - 2.5    GFR Non-African American >60 >60 mL/min    GFR African American >60 >60 mL/min    GFR Comment          GFR Staging NOT REPORTED          RADIOLOGY:  None    EKG  None    All EKG's are interpreted by the Emergency Department Physician who either signs or Co-signs this chart in the absence of a cardiologist.    EMERGENCY DEPARTMENT COURSE:  Patient in no

## 2019-08-08 VITALS
WEIGHT: 183 LBS | OXYGEN SATURATION: 99 % | RESPIRATION RATE: 15 BRPM | DIASTOLIC BLOOD PRESSURE: 62 MMHG | HEART RATE: 87 BPM | SYSTOLIC BLOOD PRESSURE: 110 MMHG | HEIGHT: 63 IN | TEMPERATURE: 98.6 F | BODY MASS INDEX: 32.43 KG/M2

## 2019-08-08 LAB
CULTURE: NORMAL
Lab: NORMAL
SPECIMEN DESCRIPTION: NORMAL

## 2019-08-08 PROCEDURE — 6370000000 HC RX 637 (ALT 250 FOR IP): Performed by: STUDENT IN AN ORGANIZED HEALTH CARE EDUCATION/TRAINING PROGRAM

## 2019-08-08 PROCEDURE — 2580000003 HC RX 258: Performed by: STUDENT IN AN ORGANIZED HEALTH CARE EDUCATION/TRAINING PROGRAM

## 2019-08-08 PROCEDURE — 96366 THER/PROPH/DIAG IV INF ADDON: CPT

## 2019-08-08 PROCEDURE — G0378 HOSPITAL OBSERVATION PER HR: HCPCS

## 2019-08-08 PROCEDURE — 96365 THER/PROPH/DIAG IV INF INIT: CPT

## 2019-08-08 PROCEDURE — 6360000002 HC RX W HCPCS: Performed by: STUDENT IN AN ORGANIZED HEALTH CARE EDUCATION/TRAINING PROGRAM

## 2019-08-08 RX ORDER — DOCUSATE SODIUM 100 MG/1
100 CAPSULE, LIQUID FILLED ORAL 2 TIMES DAILY
Status: DISCONTINUED | OUTPATIENT
Start: 2019-08-08 | End: 2019-08-08 | Stop reason: HOSPADM

## 2019-08-08 RX ORDER — METRONIDAZOLE 500 MG/1
500 TABLET ORAL 2 TIMES DAILY
Qty: 14 TABLET | Refills: 0 | Status: SHIPPED | OUTPATIENT
Start: 2019-08-08 | End: 2019-08-15

## 2019-08-08 RX ORDER — SODIUM CHLORIDE 0.9 % (FLUSH) 0.9 %
10 SYRINGE (ML) INJECTION EVERY 12 HOURS SCHEDULED
Status: DISCONTINUED | OUTPATIENT
Start: 2019-08-08 | End: 2019-08-08 | Stop reason: HOSPADM

## 2019-08-08 RX ORDER — ACETAMINOPHEN 500 MG
1000 TABLET ORAL ONCE
Status: DISCONTINUED | OUTPATIENT
Start: 2019-08-08 | End: 2019-08-08 | Stop reason: HOSPADM

## 2019-08-08 RX ORDER — ONDANSETRON 2 MG/ML
4 INJECTION INTRAMUSCULAR; INTRAVENOUS EVERY 6 HOURS PRN
Status: DISCONTINUED | OUTPATIENT
Start: 2019-08-08 | End: 2019-08-08 | Stop reason: HOSPADM

## 2019-08-08 RX ORDER — AZITHROMYCIN 250 MG/1
1000 TABLET, FILM COATED ORAL ONCE
Status: COMPLETED | OUTPATIENT
Start: 2019-08-08 | End: 2019-08-08

## 2019-08-08 RX ORDER — ACETAMINOPHEN 500 MG
1000 TABLET ORAL EVERY 6 HOURS PRN
Status: DISCONTINUED | OUTPATIENT
Start: 2019-08-08 | End: 2019-08-08 | Stop reason: HOSPADM

## 2019-08-08 RX ORDER — ACETAMINOPHEN 325 MG/1
650 TABLET ORAL EVERY 4 HOURS PRN
Status: DISCONTINUED | OUTPATIENT
Start: 2019-08-08 | End: 2019-08-08

## 2019-08-08 RX ORDER — SODIUM CHLORIDE 9 MG/ML
INJECTION, SOLUTION INTRAVENOUS CONTINUOUS
Status: DISCONTINUED | OUTPATIENT
Start: 2019-08-08 | End: 2019-08-08 | Stop reason: HOSPADM

## 2019-08-08 RX ORDER — SODIUM CHLORIDE 0.9 % (FLUSH) 0.9 %
10 SYRINGE (ML) INJECTION PRN
Status: DISCONTINUED | OUTPATIENT
Start: 2019-08-08 | End: 2019-08-08 | Stop reason: HOSPADM

## 2019-08-08 RX ORDER — AZITHROMYCIN 250 MG/1
250 TABLET, FILM COATED ORAL DAILY
Qty: 7 TABLET | Refills: 0 | Status: SHIPPED | OUTPATIENT
Start: 2019-08-08 | End: 2019-08-15

## 2019-08-08 RX ORDER — AZITHROMYCIN 500 MG/1
500 TABLET, FILM COATED ORAL DAILY
Qty: 1 TABLET | Refills: 0 | Status: SHIPPED | OUTPATIENT
Start: 2019-08-08 | End: 2019-08-09

## 2019-08-08 RX ADMIN — CEFOXITIN SODIUM 2 G: 2 POWDER, FOR SOLUTION INTRAVENOUS at 06:44

## 2019-08-08 RX ADMIN — DOCUSATE SODIUM 100 MG: 100 CAPSULE, LIQUID FILLED ORAL at 09:05

## 2019-08-08 RX ADMIN — AZITHROMYCIN 1000 MG: 250 TABLET, FILM COATED ORAL at 09:04

## 2019-08-08 RX ADMIN — METRONIDAZOLE 500 MG: 500 TABLET ORAL at 09:05

## 2019-08-08 RX ADMIN — ACETAMINOPHEN 650 MG: 325 TABLET ORAL at 09:05

## 2019-08-08 RX ADMIN — CEFOXITIN SODIUM 2 G: 2 POWDER, FOR SOLUTION INTRAVENOUS at 12:39

## 2019-08-08 RX ADMIN — SODIUM CHLORIDE: 9 INJECTION, SOLUTION INTRAVENOUS at 04:25

## 2019-08-08 ASSESSMENT — PAIN DESCRIPTION - LOCATION: LOCATION: ABDOMEN;BACK

## 2019-08-08 ASSESSMENT — PAIN DESCRIPTION - DESCRIPTORS: DESCRIPTORS: ACHING

## 2019-08-08 ASSESSMENT — ENCOUNTER SYMPTOMS
NAUSEA: 0
CHEST TIGHTNESS: 0
WHEEZING: 0
VOMITING: 0
ABDOMINAL PAIN: 0
COUGH: 0
PHOTOPHOBIA: 0
BACK PAIN: 0
SORE THROAT: 0
SHORTNESS OF BREATH: 0
TROUBLE SWALLOWING: 0

## 2019-08-08 ASSESSMENT — PAIN DESCRIPTION - PAIN TYPE: TYPE: ACUTE PAIN

## 2019-08-08 ASSESSMENT — PAIN SCALES - GENERAL
PAINLEVEL_OUTOF10: 8
PAINLEVEL_OUTOF10: 6
PAINLEVEL_OUTOF10: 8

## 2019-08-08 ASSESSMENT — PAIN DESCRIPTION - ORIENTATION: ORIENTATION: LOWER

## 2019-08-08 NOTE — ED PROVIDER NOTES
COMPREHENSIVE METABOLIC PANEL - Abnormal; Notable for the following components:    Sodium 133 (*)     Chloride 97 (*)     All other components within normal limits   URINE CULTURE   C.TRACHOMATIS N.GONORRHOEAE DNA       Us Ob Transvaginal    Result Date: 8/7/2019  EXAMINATION: FIRST TRIMESTER OBSTETRIC ULTRASOUND 8/7/2019 TECHNIQUE: Transvaginal first trimester obstetric pelvic ultrasound was performed with color Doppler flow evaluation. COMPARISON: Obstetric sonogram 11/27/2016 abdomen and pelvis CT 12/07/2015 HISTORY: ORDERING SYSTEM PROVIDED HISTORY: ectopic? Initial evaluation. FINDINGS: Uterus:  Anteverted with appropriate size, measuring 10.9 cm x 7.8 cm x 5.7 cm. No discrete myometrial lesions. Closed cervix. Gestational Sac(s):  Single normal appearing gestational sac with a mean diameter of 3.14 cm. 0.4 cm x 0.4 cm x 0.6 cm subchronic hemorrhage inferiorly. Yolk Sac:  Present Fetal Pole:  Single fetal pole Crown Rump Length:  1.57 cm Fetal Heart Rate:  178 beats per minute Right ovary:  Mildly enlarged, measuring 4.4 cm x 3.2 cm x 2.9 cm. Normal arterial and venous waveforms. 2.5 cm x 2.6 cm x 1.9 cm corpus luteum with some central hemorrhage. Follicles. Left ovary:  Normal size, measuring 3.5 cm x 2.3 cm x 2.0 cm. Normal arterial and venous waveforms. Follicles. Free fluid:  None visualized Estimated gestational age by current ultrasound:  8 weeks 0 days Estimated gestational by LMP/prior ultrasound:  4 weeks 6 days Estimated due date:  03/18/2020     1. Single live intrauterine pregnancy with an estimated gestational age of 11 weeks 0 days based upon this study (estimated due date 03/18/2020). 2. 0.4 cm x 0.4 cm x 0.6 cm subchronic hemorrhage along the inferior margin of the gestational sac. 3. Normal sonographic appearance of the ovaries without torsion. RECENT VITALS:     Temp: 99.3 °F (37.4 °C),  Pulse: 90, Resp: 18, BP: (!) 91/53, SpO2: 97 %    This patient is a 19 y.o.  Female with

## 2019-08-08 NOTE — PROGRESS NOTES
Smoking Cessation - topics covered   []  Health Risks  []  Benefits of Quitting   []  Smoking Cessation  [x]  Patient has no history of tobacco use  []  Patient is former smoker. [x]  No need for tobacco cessation education. []  Booklet given  []  Patient verbalizes understanding. []  Patient denies need for tobacco cessation education. []  Unable to meet with patient today. Will follow up as able.   Yamilka Knight  7:37 AM

## 2019-08-08 NOTE — H&P
weeks 0 days Estimated gestational by LMP/prior ultrasound:  4 weeks 6 days Estimated due date:  2020     1. Single live intrauterine pregnancy with an estimated gestational age of 11 weeks 0 days based upon this study (estimated due date 2020). 2. 0.4 cm x 0.4 cm x 0.6 cm subchronic hemorrhage along the inferior margin of the gestational sac. 3. Normal sonographic appearance of the ovaries without torsion. ASSESSMENT & PLAN:    Margie Eckert is a 19 y.o. female [id number] presenting to ED for Abdominal pain   - VSS, Afebrile    - TVUS: : SLIUP w/ 8w0d, 0.4 x 0.4 x 0.6cm subchorionic hemorrhage along inferior margin of gestational sac, no evidence of torsion   - BetaHC,604   - Cefoxitin 2g IV q6hrs and Azithromycin 1g PO x 1    - CBC: WBC 12.5   - GC/C pending    - PE: positive cervical motion tenderness, pathologic discharge, abdominal pain   - I have suspicion the patient may have PID 2/2 cervical motion tenderness and pathologic discharge. - ATSO Dr. Marco Antonio Harris   - FHT 154bpm   - IVF      Bacterial vaginosis   - Flagyl 500 mg BID x7 days      Patient Active Problem List    Diagnosis Date Noted    PID (acute pelvic inflammatory disease) 2019     15 F Ap/9 Wt: 6#9 2017    Short femur of fetus  2017    Rh+/RI/GBSpos 2017    Multigravida in third trimester 2017     Does not want an epidural, states she will let know if she decides to, interested in nitrous, breastfeeding, okay with residents and med students.  Obesity  2017       Plan discussed with Dr. Riana Ceballos, who is agreeable.      Attending's Name: Dr. Brenda Holley DO  Ob/Gyn Resident  Pager: [phone]  2019, 9:37 AM

## 2019-08-08 NOTE — PROGRESS NOTES
OBGYN Resident Interval Note    Patient seen and examined with attending physician. She denies any pelvic pain or nausea/vomiting. She has been afebrile since admission. Denies feeling feverish or having chills. Patient is okay to be discharged at this time. Encouraged patient to follow up outpatient within 1-2 weeks with [facility] or at [facility] clinic. Patient discharged home with Rx flagyl for her BV infection. Rx for Azithromycin 500 mg x1 for one day followed by 250 mg of azithromycin for 7 days given.      Vitals:    08/07/19 1741 08/08/19 0400 08/08/19 0414 08/08/19 0700   BP: 111/60  (!) 91/53 110/62   Pulse: 99  90 87   Resp: 18  18 15   Temp: 98.2 °F (36.8 °C)  99.3 °F (37.4 °C) 98.6 °F (37 °C)   TempSrc:  Oral Oral    SpO2: 100%  97% 99%   Weight: 183 lb (83 kg)      Height: 5' 3\" (1.6 m)          Mortimer Grinder, DO   OB/GYN Resident  Pager [phone]  8/8/2019, 10:46 AM

## 2019-08-08 NOTE — CONSULTS
[facility]    Patient Name: Beatriz Mccrary     Patient : 2000  Room/Bed: 9075/3050-42  Admission Date/Time: 2019  5:38 PM  Primary Care Physician: No primary care provider on file. Consulting Provider: Dr. Martin Allen MD  Reason for Consult: PID    CC:   Chief Complaint   Patient presents with    Abdominal Pain    Back Pain                HPI: Beatriz Mccrary is a 19 y.o. female [id number] presents to ED, c/o lower abdominal pain for the last few days worsening in nature. She has never had this pain before. She does not complain of any additional vaginal discharge or concern for STDs at at this time. She states that she has had PID in the past for which she was admitted for several days. Pt was unaware that she was pregnant and beta-HCG showed quant of [id number] with TVUS showing SLIUP of 8w0d with small subchorionic hemorrhage. ER resident was able to obtain vaginitis and GC/C cultures which showed BV. UA showed possible UTI with urine culture pending. CBC and CMP without any leukocytosis at this time. Pt states that this is an unplanned pregnancy and states that this is possibly an undesired pregnancy. She was not on any birth control currently. Pt is afebrile with vitals stable. REVIEW OF SYSTEMS:   A minimum of an eleven point review of systems was completed.     Constitutional: negative fever, negative chills  HEENT: negative visual disturbances, negative headaches  Respiratory: negative dyspnea, negative cough  Cardiovascular: negative chest pain,  negative palpitations  Gastrointestinal: Positive abdominal pain, negative RUQ pain, negative N/V, negative diarrhea, negative constipation  Genitourinary: negative dysuria, negative vaginal discharge, negative vaginal bleeding  Dermatological: negative rash, negative wounds  Hematologic: negative bleeding/clotting disorder  Immunologic: negative recent illness, negative recent sick contact, negative

## 2019-08-08 NOTE — ED NOTES
Pt. Requesting update  Writer notifies that OB is going to come down to discuss US results with pt.   Pt. Scot Hendrix understanding     Lisa Gordon RN  08/07/19 8595

## 2019-08-09 LAB
C TRACH DNA GENITAL QL NAA+PROBE: NEGATIVE
N. GONORRHOEAE DNA: ABNORMAL
SPECIMEN DESCRIPTION: ABNORMAL

## 2019-08-12 ENCOUNTER — TELEPHONE (OUTPATIENT)
Dept: PHARMACY | Age: 19
End: 2019-08-12

## 2019-08-12 NOTE — TELEPHONE ENCOUNTER
CLINICAL PHARMACY NOTE:  Telephone Follow-up for Positive STD Test    At the time of Rekha Pagan's [facility] and subsequent admission on 8/7/9 STD testing was performed. DNA testing was positive for  Gonorrhea. While in the ED, patient was given azithromycin 1gm orally x1 dose. She will need Rocepin 250 mg IM x1 dose for treatment as well. OB resident contacted whom will call patient for treatment. [address] [place] [address]  Ph., CACP, Clinical Pharmacist  Anticoagulation Services, [facility]  8/12/2019  10:53 AM

## 2019-08-24 PROBLEM — O99.331 HIGH RISK PREGNANCY DUE TO SMOKING IN FIRST TRIMESTER: Status: ACTIVE | Noted: 2019-08-24

## 2019-09-17 ENCOUNTER — HOSPITAL ENCOUNTER (EMERGENCY)
Age: 19
Discharge: HOME | End: 2019-09-17
Attending: EMERGENCY MEDICINE
Payer: MEDICAID

## 2019-09-17 VITALS
RESPIRATION RATE: 18 BRPM | SYSTOLIC BLOOD PRESSURE: 117 MMHG | DIASTOLIC BLOOD PRESSURE: 72 MMHG | OXYGEN SATURATION: 100 % | HEART RATE: 68 BPM | TEMPERATURE: 98.1 F

## 2019-09-17 DIAGNOSIS — M79.602 LEFT ARM PAIN: ICD-10-CM
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
## 2019-09-17 LAB
HCG QUALITATIVE: POSITIVE
HCG QUANTITATIVE: 171 IU/L
HCT VFR BLD CALC: 37.1 % (ref 36.3–47.1)
HEMOGLOBIN: 12.5 G/DL (ref 11.9–15.1)
MCH RBC QN AUTO: 31.8 PG (ref 25.2–33.5)
MCHC RBC AUTO-ENTMCNC: 33.7 G/DL (ref 28.4–34.8)
MCV RBC AUTO: 94.4 FL (ref 82.6–102.9)
NRBC AUTOMATED: 0 PER 100 WBC
PDW BLD-RTO: 12.6 % (ref 11.8–14.4)
PLATELET # BLD: 174 K/UL (ref 138–453)
PMV BLD AUTO: 10.6 FL (ref 8.1–13.5)
RBC # BLD: 3.93 M/UL (ref 3.95–5.11)
WBC # BLD: 5.6 K/UL (ref 4.5–13.5)

## 2019-09-17 PROCEDURE — 85027 COMPLETE CBC AUTOMATED: CPT

## 2019-09-17 PROCEDURE — 99283 EMERGENCY DEPT VISIT LOW MDM: CPT

## 2019-09-17 PROCEDURE — 84702 CHORIONIC GONADOTROPIN TEST: CPT

## 2019-09-17 PROCEDURE — 84703 CHORIONIC GONADOTROPIN ASSAY: CPT

## 2019-09-17 PROCEDURE — 6370000000 HC RX 637 (ALT 250 FOR IP): Performed by: STUDENT IN AN ORGANIZED HEALTH CARE EDUCATION/TRAINING PROGRAM

## 2019-09-17 RX ORDER — IBUPROFEN 600 MG/1
600 TABLET ORAL EVERY 8 HOURS PRN
Qty: 42 TABLET | Refills: 0 | Status: SHIPPED | OUTPATIENT
Start: 2019-09-17 | End: 2020-02-06 | Stop reason: ALTCHOICE

## 2019-09-17 RX ORDER — ACETAMINOPHEN 500 MG
1000 TABLET ORAL EVERY 8 HOURS PRN
Qty: 84 TABLET | Refills: 0 | Status: SHIPPED | OUTPATIENT
Start: 2019-09-17 | End: 2020-02-06 | Stop reason: ALTCHOICE

## 2019-09-17 RX ORDER — IBUPROFEN 800 MG/1
800 TABLET ORAL ONCE
Status: COMPLETED | OUTPATIENT
Start: 2019-09-17 | End: 2019-09-17

## 2019-09-17 RX ORDER — ORPHENADRINE CITRATE 100 MG/1
100 TABLET, EXTENDED RELEASE ORAL 2 TIMES DAILY
Qty: 14 TABLET | Refills: 0 | Status: SHIPPED | OUTPATIENT
Start: 2019-09-17 | End: 2019-09-24

## 2019-09-17 RX ORDER — ACETAMINOPHEN 500 MG
1000 TABLET ORAL ONCE
Status: COMPLETED | OUTPATIENT
Start: 2019-09-17 | End: 2019-09-17

## 2019-09-17 RX ADMIN — ACETAMINOPHEN 1000 MG: 500 TABLET ORAL at 11:33

## 2019-09-17 RX ADMIN — IBUPROFEN 800 MG: 800 TABLET, FILM COATED ORAL at 11:33

## 2019-09-17 ASSESSMENT — PAIN SCALES - GENERAL
PAINLEVEL_OUTOF10: 8
PAINLEVEL_OUTOF10: 9

## 2019-09-17 ASSESSMENT — PAIN DESCRIPTION - ORIENTATION: ORIENTATION: LEFT

## 2019-09-17 ASSESSMENT — PAIN DESCRIPTION - LOCATION: LOCATION: SHOULDER

## 2019-09-17 ASSESSMENT — PAIN DESCRIPTION - DESCRIPTORS: DESCRIPTORS: ACHING

## 2019-09-17 ASSESSMENT — PAIN DESCRIPTION - PAIN TYPE: TYPE: ACUTE PAIN

## 2019-09-17 NOTE — ED PROVIDER NOTES
[facility]     Emergency Department     Faculty Attestation    I performed a history and physical examination of the patient and discussed management with the resident. I reviewed the residents note and agree with the documented findings and plan of care. Any areas of disagreement are noted on the chart. I was personally present for the key portions of any procedures. I have documented in the chart those procedures where I was not present during the key portions. I have reviewed the emergency nurses triage note. I agree with the chief complaint, past medical history, past surgical history, allergies, medications, social and family history as documented unless otherwise noted below. Documentation of the HPI, Physical Exam and Medical Decision Making performed by medical students or scribes is based on my personal performance of the HPI, PE and MDM. For Physician Assistant/ Nurse Practitioner cases/documentation I have personally evaluated this patient and have completed at least one if not all key elements of the E/M (history, physical exam, and MDM). Additional findings are as noted. Vital signs:   Vitals:    09/17/19 1014   BP: 117/72   Pulse: 68   Resp: 18   Temp: 98.1 °F (36.7 °C)   SpO2: 8%      1year-old female presents complaining of left arm pain. Patient states that she works at [employer], and lifts boxes all day. When asked to localize her pain, she points more towards the proximal humerus. She has full range of motion, but is uncomfortable for her. Of note, the patient's entire anterior surface of her body is covered in bruises. She has no bruises on her back or on her backs of her legs. Patient states that she just bruises easily. She denies having been assaulted. She states that she is safe at home. We will check a CBC. We will discuss with social work.             Marita Habermann, M.D,  Attending Emergency  Physician           Roderick Briggs MD  09/17/19 3535

## 2019-09-19 ASSESSMENT — ENCOUNTER SYMPTOMS
BACK PAIN: 0
SHORTNESS OF BREATH: 0
ABDOMINAL DISTENTION: 0
TROUBLE SWALLOWING: 0
WHEEZING: 0
ABDOMINAL PAIN: 0
NAUSEA: 0
SORE THROAT: 0
VOMITING: 0
COUGH: 1

## 2019-09-19 NOTE — ED PROVIDER NOTES
[facility]  Emergency Department Encounter  Emergency Medicine Resident     Pt Name: Guillermo Lopez  MRN: [id number]  [place] 2000  Date of evaluation: 9/19/19  PCP:  No primary care provider on file. CHIEF COMPLAINT       Chief Complaint   Patient presents with    Arm Pain       HISTORY OF PRESENT ILLNESS  (Location/Symptom, Timing/Onset, Context/Setting, Quality, Duration, Modifying Factors, Severity.)    Guillermo Lopez is a 19 y.o. female who presents with left arm pain. Pain is located primarily to her left upper arm, left shoulder. Patient does not indicate much pain in her elbow, forearm, or hand. 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 Patient does have full range of motion to both arms. Passive range of motion intact to left arm. Patient does have extensive bruising to her arms bilaterally, thighs bilaterally, and lower legs. Patient states that she bruises easily notes from work. Denies any history of assaults or abuse. States that she feels safe at home and at work. Patient also states that she is currently getting over cold. Initially patient's chart stated that she was pregnant however the chart was updated indicate that she was having periods. Will inquire about patient's pregnancy status. PAST MEDICAL / SURGICAL / SOCIAL / FAMILY HISTORY    has a past medical history of Pelvic inflammatory disease. has a past surgical history that includes fracture surgery; Tympanostomy tube placement; Tonsillectomy; Adenoidectomy; and Appendectomy.     Social History     Socioeconomic History    Marital status: Single     Spouse name: Not on file    Number of children: Not on file    Years of education: Not on file    Highest education level: Not on file   Occupational History    Not on file   Social Needs    Financial resource strain: Not on file    Food insecurity:     Worry: Not on file     Inability: Not on file    Transportation needs:     Medical: Not on file     Non-medical: Not on file   Tobacco Use    Smoking status: Never Smoker    Smokeless tobacco: Never Used   Substance and Sexual Activity    Alcohol use: No    Drug use: No    Sexual activity: Yes     Partners: Male   Lifestyle    Physical activity:     Days per week: Not on file     Minutes per session: Not on file    Stress: Not on file   Relationships    Social connections:     Talks on phone: Not on file     Gets together: Not on file     Attends Yarsanism service: Not on file     Active member of club or organization: Not on file     Attends meetings of clubs or organizations: Not on file     Relationship status: Not on file    Intimate partner violence:     Fear of current or ex partner: Not on file     Emotionally abused: Not on file     Physically abused: Not on file     Forced sexual activity: Not on file   Other Topics Concern    Not on file   Social History Narrative    Not on file       History reviewed. No pertinent family history. Allergies:    Patient has no known allergies. Home Medications:  Prior to Admission medications    Medication Sig Start Date End Date Taking? Authorizing Provider   acetaminophen (TYLENOL) 500 MG tablet Take 2 tablets by mouth every 8 hours as needed for Pain 9/17/19 10/1/19 Yes Cheri Sneed MD   ibuprofen (IBU) 600 MG tablet Take 1 tablet by mouth every 8 hours as needed for Pain 9/17/19 10/1/19 Yes Cheri Sneed MD   orphenadrine (NORFLEX) 100 MG extended release tablet Take 1 tablet by mouth 2 times daily for 7 days 9/17/19 9/24/19 Yes Cheri Sneed MD       REVIEW OF SYSTEMS    (2-9 systems for level 4, 10 or more for level 5)    Review of Systems   Constitutional: Positive for fatigue. Negative for chills and fever. HENT: Negative for congestion, sore throat and trouble swallowing. Respiratory: Positive for cough.  Negative for shortness of breath and wheezing. Cardiovascular: Negative for chest pain and palpitations. Gastrointestinal: Negative for abdominal distention, abdominal pain, nausea and vomiting. Genitourinary: Negative for dysuria, hematuria and pelvic pain. Musculoskeletal: Positive for myalgias. Negative for back pain and neck stiffness. Skin: Negative for pallor. Neurological: Negative for dizziness and weakness. Hematological: Bruises/bleeds easily. Psychiatric/Behavioral: Negative for confusion. The patient is not nervous/anxious. All other systems reviewed and are negative. PHYSICAL EXAM   (up to 7 for level 4, 8 or more for level 5)    INITIAL VITALS:   ED Triage Vitals [09/17/19 1014]   BP Temp Temp Source Pulse Resp SpO2 Height Weight   117/72 98.1 °F (36.7 °C) Oral 68 18 100 % -- --       Physical Exam   Constitutional: She is oriented to person, place, and time. She appears well-developed and well-nourished. She does not appear ill. No distress. HENT:   Head: Normocephalic and atraumatic. Eyes: Pupils are equal, round, and reactive to light. EOM are normal.   Neck: Normal range of motion. Neck supple. No JVD present. Muscular tenderness present. No spinous process tenderness present. No neck rigidity. Normal range of motion present. No Brudzinski's sign and no Kernig's sign noted. Different pain is elicited when neck is side bent to the left and pressure is placed through the head and neck. Patient states that she does feel pain with this however it is not the typical pain that she has been feeling over the past few days. Cardiovascular: Normal rate, regular rhythm, normal heart sounds and intact distal pulses. No murmur heard. Pulses:       Carotid pulses are 2+ on the right side, and 2+ on the left side. Radial pulses are 2+ on the right side, and 2+ on the left side. Pulmonary/Chest: Effort normal. No respiratory distress. She has no decreased breath sounds. She has no wheezes.

## 2019-10-08 ENCOUNTER — HOSPITAL ENCOUNTER (EMERGENCY)
Age: 19
Discharge: HOME | End: 2019-10-08
Attending: EMERGENCY MEDICINE
Payer: MEDICAID

## 2019-10-08 VITALS
WEIGHT: 160 LBS | HEIGHT: 64 IN | RESPIRATION RATE: 18 BRPM | HEART RATE: 97 BPM | TEMPERATURE: 97.3 F | DIASTOLIC BLOOD PRESSURE: 69 MMHG | OXYGEN SATURATION: 100 % | BODY MASS INDEX: 27.31 KG/M2 | SYSTOLIC BLOOD PRESSURE: 114 MMHG

## 2019-10-08 DIAGNOSIS — N89.8 VAGINAL DISCHARGE: ICD-10-CM

## 2019-10-08 DIAGNOSIS — Z20.2 EXPOSURE TO STD: Primary | ICD-10-CM

## 2019-10-08 LAB
C TRACH DNA GENITAL QL NAA+PROBE: NEGATIVE
DIRECT EXAM: NORMAL
HCG, PREGNANCY URINE (POC): NEGATIVE
Lab: NORMAL
N. GONORRHOEAE DNA: NEGATIVE
PREGNANCY TEST URINE, POC: NEGATIVE
SPECIMEN DESCRIPTION: NORMAL
SPECIMEN DESCRIPTION: NORMAL

## 2019-10-08 PROCEDURE — 87591 N.GONORRHOEAE DNA AMP PROB: CPT

## 2019-10-08 PROCEDURE — 6370000000 HC RX 637 (ALT 250 FOR IP): Performed by: STUDENT IN AN ORGANIZED HEALTH CARE EDUCATION/TRAINING PROGRAM

## 2019-10-08 PROCEDURE — 87480 CANDIDA DNA DIR PROBE: CPT

## 2019-10-08 PROCEDURE — 96372 THER/PROPH/DIAG INJ SC/IM: CPT

## 2019-10-08 PROCEDURE — 99283 EMERGENCY DEPT VISIT LOW MDM: CPT

## 2019-10-08 PROCEDURE — 6360000002 HC RX W HCPCS: Performed by: STUDENT IN AN ORGANIZED HEALTH CARE EDUCATION/TRAINING PROGRAM

## 2019-10-08 PROCEDURE — 87510 GARDNER VAG DNA DIR PROBE: CPT

## 2019-10-08 PROCEDURE — 87660 TRICHOMONAS VAGIN DIR PROBE: CPT

## 2019-10-08 PROCEDURE — 2500000003 HC RX 250 WO HCPCS

## 2019-10-08 PROCEDURE — 87491 CHLMYD TRACH DNA AMP PROBE: CPT

## 2019-10-08 PROCEDURE — 81025 URINE PREGNANCY TEST: CPT

## 2019-10-08 RX ORDER — AZITHROMYCIN 250 MG/1
1000 TABLET, FILM COATED ORAL ONCE
Status: COMPLETED | OUTPATIENT
Start: 2019-10-08 | End: 2019-10-08

## 2019-10-08 RX ORDER — LIDOCAINE HYDROCHLORIDE 10 MG/ML
INJECTION, SOLUTION INFILTRATION; PERINEURAL
Status: COMPLETED
Start: 2019-10-08 | End: 2019-10-08

## 2019-10-08 RX ORDER — CEFTRIAXONE SODIUM 250 MG/1
250 INJECTION, POWDER, FOR SOLUTION INTRAMUSCULAR; INTRAVENOUS ONCE
Status: COMPLETED | OUTPATIENT
Start: 2019-10-08 | End: 2019-10-08

## 2019-10-08 RX ADMIN — AZITHROMYCIN 1000 MG: 250 TABLET, FILM COATED ORAL at 05:12

## 2019-10-08 RX ADMIN — LIDOCAINE HYDROCHLORIDE 15 MG: 10 INJECTION, SOLUTION INFILTRATION; PERINEURAL at 05:13

## 2019-10-08 RX ADMIN — CEFTRIAXONE SODIUM 250 MG: 250 INJECTION, POWDER, FOR SOLUTION INTRAMUSCULAR; INTRAVENOUS at 05:11

## 2019-10-08 ASSESSMENT — ENCOUNTER SYMPTOMS
COUGH: 0
SORE THROAT: 0
DIARRHEA: 0
SHORTNESS OF BREATH: 0
ABDOMINAL PAIN: 0
NAUSEA: 0
VOMITING: 0

## 2019-10-08 ASSESSMENT — PAIN SCALES - GENERAL: PAINLEVEL_OUTOF10: 0

## 2020-02-06 ENCOUNTER — HOSPITAL ENCOUNTER (OUTPATIENT)
Age: 20
Setting detail: SPECIMEN
Discharge: HOME | End: 2020-02-06
Payer: MEDICAID

## 2020-02-06 ENCOUNTER — OFFICE VISIT (OUTPATIENT)
Dept: OBGYN CLINIC | Age: 20
End: 2020-02-06
Payer: MEDICAID

## 2020-02-06 VITALS
WEIGHT: 171 LBS | SYSTOLIC BLOOD PRESSURE: 92 MMHG | BODY MASS INDEX: 29.19 KG/M2 | HEIGHT: 64 IN | DIASTOLIC BLOOD PRESSURE: 60 MMHG

## 2020-02-06 PROCEDURE — G8427 DOCREV CUR MEDS BY ELIG CLIN: HCPCS | Performed by: NURSE PRACTITIONER

## 2020-02-06 PROCEDURE — 1036F TOBACCO NON-USER: CPT | Performed by: NURSE PRACTITIONER

## 2020-02-06 PROCEDURE — G8484 FLU IMMUNIZE NO ADMIN: HCPCS | Performed by: NURSE PRACTITIONER

## 2020-02-06 PROCEDURE — 99203 OFFICE O/P NEW LOW 30 MIN: CPT | Performed by: NURSE PRACTITIONER

## 2020-02-06 PROCEDURE — G8419 CALC BMI OUT NRM PARAM NOF/U: HCPCS | Performed by: NURSE PRACTITIONER

## 2020-02-06 RX ORDER — VALACYCLOVIR HYDROCHLORIDE 500 MG/1
500 TABLET, FILM COATED ORAL 2 TIMES DAILY
Qty: 20 TABLET | Refills: 6 | Status: SHIPPED | OUTPATIENT
Start: 2020-02-06 | End: 2020-02-16

## 2020-02-07 LAB
DIRECT EXAM: ABNORMAL
Lab: ABNORMAL
SPECIMEN DESCRIPTION: ABNORMAL

## 2020-02-07 RX ORDER — METRONIDAZOLE 500 MG/1
500 TABLET ORAL 2 TIMES DAILY
Qty: 14 TABLET | Refills: 0 | Status: SHIPPED | OUTPATIENT
Start: 2020-02-07 | End: 2020-05-12 | Stop reason: SDUPTHER

## 2020-05-12 ENCOUNTER — TELEPHONE (OUTPATIENT)
Dept: OBGYN CLINIC | Age: 20
End: 2020-05-12

## 2020-05-12 RX ORDER — METRONIDAZOLE 500 MG/1
500 TABLET ORAL 2 TIMES DAILY
Qty: 14 TABLET | Refills: 0 | Status: SHIPPED | OUTPATIENT
Start: 2020-05-12 | End: 2020-05-19

## 2020-05-14 ENCOUNTER — HOSPITAL ENCOUNTER (OUTPATIENT)
Age: 20
Setting detail: SPECIMEN
Discharge: HOME | End: 2020-05-14
Payer: MEDICAID

## 2020-05-14 ENCOUNTER — OFFICE VISIT (OUTPATIENT)
Dept: OBGYN CLINIC | Age: 20
End: 2020-05-14
Payer: MEDICAID

## 2020-05-14 VITALS
WEIGHT: 165.2 LBS | SYSTOLIC BLOOD PRESSURE: 90 MMHG | HEIGHT: 64 IN | DIASTOLIC BLOOD PRESSURE: 60 MMHG | BODY MASS INDEX: 28.2 KG/M2

## 2020-05-14 PROCEDURE — 99395 PREV VISIT EST AGE 18-39: CPT | Performed by: NURSE PRACTITIONER

## 2020-05-14 RX ORDER — VALACYCLOVIR HYDROCHLORIDE 500 MG/1
500 TABLET, FILM COATED ORAL DAILY
Qty: 30 TABLET | Refills: 5 | Status: SHIPPED | OUTPATIENT
Start: 2020-05-14 | End: 2020-11-17

## 2020-05-14 ASSESSMENT — ENCOUNTER SYMPTOMS
CONSTIPATION: 0
COUGH: 0
SHORTNESS OF BREATH: 0
ABDOMINAL PAIN: 0
BACK PAIN: 0
ABDOMINAL DISTENTION: 0
DIARRHEA: 0

## 2020-05-14 NOTE — PROGRESS NOTES
flank pain, frequency, menstrual problem, pelvic pain and vaginal discharge. Musculoskeletal: Negative for back pain. Neurological: Negative for syncope and headaches. Psychiatric/Behavioral: Negative for behavioral problems. Objective:     BP 90/60 (Site: Right Upper Arm, Position: Sitting, Cuff Size: Medium Adult)   Ht 5' 4\" (1.626 m)   Wt 165 lb 3.2 oz (74.9 kg)   LMP 04/30/2020   Breastfeeding No   BMI 28.36 kg/m²   Physical Exam  Constitutional:       Appearance: She is well-developed. Eyes:      Pupils: Pupils are equal, round, and reactive to light. Neck:      Thyroid: No thyromegaly. Trachea: No tracheal deviation. Cardiovascular:      Rate and Rhythm: Normal rate and regular rhythm. Heart sounds: Normal heart sounds. Pulmonary:      Effort: Pulmonary effort is normal. No respiratory distress. Breath sounds: Normal breath sounds. Chest:      Breasts: Breasts are symmetrical.         Right: No inverted nipple. Left: No inverted nipple, mass, nipple discharge, skin change or tenderness. Abdominal:      General: Bowel sounds are normal. There is no distension. Palpations: Abdomen is soft. There is no mass. Tenderness: There is no abdominal tenderness. Hernia: There is no hernia in the right inguinal area or left inguinal area. Genitourinary:     Labia:         Right: No rash or lesion. Left: No rash or lesion. Vagina: No vaginal discharge or tenderness. Cervix: No cervical motion tenderness, discharge or friability. Uterus: Not deviated, not enlarged and not fixed. Adnexa:         Right: No mass, tenderness or fullness. Left: No mass, tenderness or fullness. Musculoskeletal:         General: No tenderness. Lymphadenopathy:      Cervical: No cervical adenopathy. Skin:     General: Skin is warm and dry. Neurological:      Mental Status: She is alert and oriented to person, place, and time.

## 2020-05-14 NOTE — PATIENT INSTRUCTIONS
all possible uses, directions, precautions, warnings, drug interactions, allergic reactions, or adverse effects. If you have questions about the drugs you are taking, check with your doctor, nurse or pharmacist.  Copyright 9244-7788 [address]. Version: 8.02. Revision date: 9/1/2015. Care instructions adapted under license by [facility] ([facility]). If you have questions about a medical condition or this instruction, always ask your healthcare professional. [place] [address] any warranty or liability for your use of this information.

## 2020-05-21 LAB — CYTOLOGY REPORT: NORMAL

## 2020-09-12 ENCOUNTER — HOSPITAL ENCOUNTER (EMERGENCY)
Age: 20
Discharge: HOME | End: 2020-09-12
Attending: EMERGENCY MEDICINE
Payer: MEDICAID

## 2020-09-12 VITALS
TEMPERATURE: 98 F | RESPIRATION RATE: 18 BRPM | WEIGHT: 170 LBS | OXYGEN SATURATION: 100 % | DIASTOLIC BLOOD PRESSURE: 70 MMHG | HEIGHT: 63 IN | HEART RATE: 75 BPM | BODY MASS INDEX: 30.12 KG/M2 | SYSTOLIC BLOOD PRESSURE: 113 MMHG

## 2020-09-12 LAB
-: NORMAL
AMORPHOUS: NORMAL
BACTERIA: NORMAL
BILIRUBIN URINE: NEGATIVE
CASTS UA: NORMAL /LPF (ref 0–8)
COLOR: YELLOW
COMMENT UA: ABNORMAL
CRYSTALS, UA: NORMAL /HPF
DIRECT EXAM: ABNORMAL
EPITHELIAL CELLS UA: NORMAL /HPF (ref 0–5)
GLUCOSE URINE: NEGATIVE
HCG(URINE) PREGNANCY TEST: NEGATIVE
KETONES, URINE: NEGATIVE
LEUKOCYTE ESTERASE, URINE: NEGATIVE
Lab: ABNORMAL
MUCUS: NORMAL
NITRITE, URINE: NEGATIVE
OTHER OBSERVATIONS UA: NORMAL
PH UA: 6.5 (ref 5–8)
PROTEIN UA: NEGATIVE
RBC UA: NORMAL /HPF (ref 0–4)
RENAL EPITHELIAL, UA: NORMAL /HPF
SPECIFIC GRAVITY UA: 1.01 (ref 1–1.03)
SPECIMEN DESCRIPTION: ABNORMAL
TRICHOMONAS: NORMAL
TURBIDITY: CLEAR
URINE HGB: ABNORMAL
UROBILINOGEN, URINE: NORMAL
WBC UA: NORMAL /HPF (ref 0–5)
YEAST: NORMAL

## 2020-09-12 PROCEDURE — 87510 GARDNER VAG DNA DIR PROBE: CPT

## 2020-09-12 PROCEDURE — 81025 URINE PREGNANCY TEST: CPT

## 2020-09-12 PROCEDURE — 99283 EMERGENCY DEPT VISIT LOW MDM: CPT

## 2020-09-12 PROCEDURE — 87591 N.GONORRHOEAE DNA AMP PROB: CPT

## 2020-09-12 PROCEDURE — 87480 CANDIDA DNA DIR PROBE: CPT

## 2020-09-12 PROCEDURE — 87491 CHLMYD TRACH DNA AMP PROBE: CPT

## 2020-09-12 PROCEDURE — 81001 URINALYSIS AUTO W/SCOPE: CPT

## 2020-09-12 PROCEDURE — 87660 TRICHOMONAS VAGIN DIR PROBE: CPT

## 2020-09-12 PROCEDURE — 6370000000 HC RX 637 (ALT 250 FOR IP): Performed by: STUDENT IN AN ORGANIZED HEALTH CARE EDUCATION/TRAINING PROGRAM

## 2020-09-12 RX ORDER — METRONIDAZOLE 500 MG/1
500 TABLET ORAL ONCE
Status: COMPLETED | OUTPATIENT
Start: 2020-09-12 | End: 2020-09-12

## 2020-09-12 RX ORDER — ACETAMINOPHEN 500 MG
1000 TABLET ORAL ONCE
Status: COMPLETED | OUTPATIENT
Start: 2020-09-12 | End: 2020-09-12

## 2020-09-12 RX ORDER — METRONIDAZOLE 500 MG/1
500 TABLET ORAL 2 TIMES DAILY
Qty: 14 TABLET | Refills: 0 | Status: SHIPPED | OUTPATIENT
Start: 2020-09-12 | End: 2020-09-19

## 2020-09-12 RX ADMIN — METRONIDAZOLE 500 MG: 500 TABLET ORAL at 09:53

## 2020-09-12 RX ADMIN — ACETAMINOPHEN 1000 MG: 500 TABLET ORAL at 07:51

## 2020-09-12 ASSESSMENT — PAIN SCALES - GENERAL
PAINLEVEL_OUTOF10: 6
PAINLEVEL_OUTOF10: 7

## 2020-09-12 ASSESSMENT — ENCOUNTER SYMPTOMS
VOMITING: 0
ABDOMINAL PAIN: 1
SHORTNESS OF BREATH: 0
CONSTIPATION: 0
COUGH: 0
NAUSEA: 0
DIARRHEA: 0
BACK PAIN: 0

## 2020-09-12 ASSESSMENT — PAIN DESCRIPTION - PROGRESSION: CLINICAL_PROGRESSION: NOT CHANGED

## 2020-09-12 ASSESSMENT — PAIN DESCRIPTION - DESCRIPTORS: DESCRIPTORS: THROBBING

## 2020-09-12 ASSESSMENT — PAIN DESCRIPTION - PAIN TYPE: TYPE: ACUTE PAIN

## 2020-09-12 ASSESSMENT — PAIN DESCRIPTION - ONSET: ONSET: ON-GOING

## 2020-09-12 ASSESSMENT — PAIN DESCRIPTION - LOCATION: LOCATION: ABDOMEN

## 2020-09-12 ASSESSMENT — PAIN DESCRIPTION - ORIENTATION: ORIENTATION: LOWER

## 2020-09-12 ASSESSMENT — PAIN DESCRIPTION - FREQUENCY: FREQUENCY: CONTINUOUS

## 2020-09-12 NOTE — ED NOTES
Pt presents to the ED with complaints of lower abdominal pain that is a 7/10. Pt states she was tasting blood in her mouth all day. Pt was at work around [employer]  when she states she felt like she peed herself but was covered in blood. Pt states she passed about 10 golf ball sized blood clots. Pt states she had a miscarriage in may and her LMP was in august. Pt denies any other complaints.       Joceline Leal  09/12/20 8026

## 2020-09-12 NOTE — ED PROVIDER NOTES
to her gynecologist for Pap smear, anticipate use of ibuprofen. Reyna Gravely.  Denis Fitzpatrick MD, [facility]  Attending Emergency  Physician                Eli Dowling MD  09/12/20 5775

## 2020-09-12 NOTE — ED PROVIDER NOTES
[facility] ED  Emergency Department Encounter  Emergency Medicine Resident     Pt Name: Tere Carballo  MRN: [id number]  [place] 2000  Date of evaluation: 9/12/20  PCP:  No primary care provider on file. CHIEF COMPLAINT       No chief complaint on file. HISTORY OFPRESENT ILLNESS  (Location/Symptom, Timing/Onset, Context/Setting, Quality, Duration, Modifying Factors,Severity.)      Tere Carballo is a 22 y.o. female who presents with complaints of vaginal bleeding and lower abdominal pain. Patient states that yesterday she was at work when she thought she had peed herself but actually had vaginal bleeding. She states that she has had 2 prior pregnancies, 1 stillbirth and 1 miscarriage. She states the miscarriage occurred approximately 3 months ago. She states that this vaginal bleeding is worse than at that time. She states that she was going through approximately 1 pad every hour. She states she did have a normal period in August, is unsure of possibility of pregnancy as she is sexually active. 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 She states that she does not feel that is similar to PID. She does admit to some vaginal discharge which is yellow in nature. She denies any dysuria, hematuria. She denies lightheadedness, dizziness, fever, nausea, vomiting, diarrhea, constipation. She does state that she has some lower abdominal pain which she describes as suprapubic, right and left lower quadrant. Denies any history of abdominal surgery. PAST MEDICAL / SURGICAL / SOCIAL / FAMILY HISTORY      has a past medical history of HSV-2 infection, LGSIL of cervix of undetermined significance, and Pelvic inflammatory disease. has a past surgical history that includes fracture surgery (Left); Tympanostomy tube placement; Adenoidectomy; and Appendectomy.      Social History     Socioeconomic History    Marital status: Single     Spouse name: Not on file    Number of children: Not on file    Years of education: Not on file    Highest education level: Not on file   Occupational History    Not on file   Social Needs    Financial resource strain: Not on file    Food insecurity     Worry: Not on file     Inability: Not on file    Transportation needs     Medical: Not on file     Non-medical: Not on file   Tobacco Use    Smoking status: Never Smoker    Smokeless tobacco: Never Used   Substance and Sexual Activity    Alcohol use: No     Comment: socially    Drug use: No    Sexual activity: Yes     Partners: Male   Lifestyle    Physical activity     Days per week: Not on file     Minutes per session: Not on file    Stress: Not on file   Relationships    Social connections     Talks on phone: Not on file     Gets together: Not on file     Attends Christian service: Not on file     Active member of club or organization: Not on file     Attends meetings of clubs or organizations: Not on file     Relationship status: Not on file    Intimate partner violence     Fear of current or ex partner: Not on file     Emotionally abused: Not on file     Physically abused: Not on file     Forced sexual activity: Not on file   Other Topics Concern    Not on file   Social History Narrative    Not on file       History reviewed. No pertinent family history. Allergies:  Patient has no known allergies. Home Medications:  Prior to Admission medications    Medication Sig Start Date End Date Taking? Authorizing Provider   metroNIDAZOLE (FLAGYL) 500 MG tablet Take 1 tablet by mouth 2 times daily for 7 days 9/12/20 9/19/20 Yes Latesha Tucker,    valACYclovir (VALTREX) 500 MG tablet Take 1 tablet by mouth daily 5/14/20 6/13/20  LEELA Santa - CNP       REVIEW OFSYSTEMS    (2-9 systems for level 4, 10 or more for level 5)      Review of Systems   Constitutional: Negative for activity change, appetite change, chills, fatigue and fever.    Respiratory: Negative for cough and shortness of breath. Cardiovascular: Negative for chest pain. Gastrointestinal: Positive for abdominal pain. Negative for constipation, diarrhea, nausea and vomiting. Genitourinary: Positive for vaginal bleeding and vaginal discharge. Negative for dysuria, frequency, hematuria and urgency. Musculoskeletal: Negative for back pain. Skin: Negative for wound. Neurological: Negative for dizziness, syncope, weakness, light-headedness, numbness and headaches. Hematological: Does not bruise/bleed easily. PHYSICAL EXAM   (up to 7 for level 4, 8 or more forlevel 5)      INITIAL VITALS:   ED Triage Vitals   BP Temp Temp Source Pulse Resp SpO2 Height Weight   09/12/20 0729 09/12/20 0734 09/12/20 0729 09/12/20 0729 09/12/20 0729 09/12/20 0729 09/12/20 0729 09/12/20 0729   113/70 98 °F (36.7 °C) Oral 75 18 100 % 5' 3\" (1.6 m) 170 lb (77.1 kg)       Physical Exam  Vitals signs and nursing note reviewed. Exam conducted with a chaperone present. Constitutional:       General: She is not in acute distress. Appearance: Normal appearance. She is well-developed. She is not diaphoretic. HENT:      Head: Normocephalic and atraumatic. Eyes:      Conjunctiva/sclera: Conjunctivae normal.   Cardiovascular:      Rate and Rhythm: Normal rate and regular rhythm. Heart sounds: Normal heart sounds. Pulmonary:      Effort: Pulmonary effort is normal. No respiratory distress. Breath sounds: Normal breath sounds. Abdominal:      General: There is no distension. Palpations: Abdomen is soft. Tenderness: There is abdominal tenderness in the right lower quadrant, suprapubic area and left lower quadrant. There is no guarding. Genitourinary:     Exam position: Supine. Labia:         Right: No rash or tenderness. Left: No rash or tenderness. Vagina: Bleeding present. Cervix: Cervical bleeding present. No cervical motion tenderness, discharge or erythema. Uterus: Tender. Adnexa:         Right: Tenderness present. Left: Tenderness present. Comments: OS closed  Skin:     General: Skin is warm and dry. Neurological:      Mental Status: She is alert. Mental status is at baseline. Psychiatric:         Behavior: Behavior normal.         Thought Content: Thought content normal.         DIFFERENTIAL  DIAGNOSIS     PLAN (LABS / IMAGING / EKG):  Orders Placed This Encounter   Procedures    VAGINITIS DNA PROBE    C.trachomatis N.gonorrhoeae DNA    Urinalysis Reflex to Culture    Pregnancy, Urine    Microscopic Urinalysis       MEDICATIONS ORDERED:  Orders Placed This Encounter   Medications    acetaminophen (TYLENOL) tablet 1,000 mg    metroNIDAZOLE (FLAGYL) 500 MG tablet     Sig: Take 1 tablet by mouth 2 times daily for 7 days     Dispense:  14 tablet     Refill:  0    metroNIDAZOLE (FLAGYL) tablet 500 mg       Initial MDM/Plan/ED COURSE:    22 y.o. female who presents with complaints of vaginal bleeding and vaginal discharge as well as lower abdominal pain. On exam patient is well-appearing, nontoxic. Vital signs are within normal limits. On physical exam abdomen is tender to palpation in the suprapubic and bilateral right and left lower quadrant without guarding, rigidity, rebound tenderness. Cardiac regular rate and rhythm. Lungs clear to auscultation bilaterally. No conjunctival pallor. On  exam patient has slow oozing of blood from the cervix, no active bleeding. Office closed. No discharge or erythema. Patient does have mild tenderness palpation of the uterus and bilateral adnexa, no enlargement or mass. No lesions. We will plan for urinalysis, pregnancy test, GC/chlamydia and vaginitis. Low suspicion for retained products given that the miscarriage was approximately 3 to 4 months ago and patient is afebrile, well-appearing with normal vital signs.     ED Course as of Sep 12 1047   Sat Sep 12, 2020   0848 DIRECT EXAM.(!): POSITIVE for Gardnerella vaginalis. [LW]   0901 HCG(Urine) Pregnancy Test: NEGATIVE [LW]      ED Course User Index  [LW] Ludwin Vega, DO      Urinalysis shows blood. Pregnancy test negative. Vaginitis probe positive for BV. Did discuss with patient if she would like treatment for GC/chlamydia/PID at this time given the abdominal tenderness on exam.  She states that this does not feel similar and she does not want pretreated at this time. Will give dose of Flagyl here prior to discharge. Patient was given strict return precautions including any worsening or new symptoms such as passing out, worsening or persistent bleeding, inability get into OB, fevers or any other new or concerning symptoms. She was instructed to take antibiotic as prescribed complete the entire course even if she begins to feel better. She was instructed not to drink alcohol while taking as it may make her sick. She was provided with follow-up with her OB, and was offered follow-up with our OB as well which she declined. Patient instructed that she should call in the next day to schedule a follow-up appointment for evaluation. Patient agreeable for discharge at this time, all questions answered. Patient discharged home in stable condition. DIAGNOSTIC RESULTS / EMERGENCYDEPARTMENT COURSE / MDM     LABS:  Labs Reviewed   VAGINITIS DNA PROBE - Abnormal; Notable for the following components:       Result Value    Direct Exam POSITIVE for Gardnerella vaginalis. (*)     All other components within normal limits   URINE RT REFLEX TO CULTURE - Abnormal; Notable for the following components:    Urine Hgb LARGE (*)     All other components within normal limits   C.TRACHOMATIS N.GONORRHOEAE DNA   PREGNANCY, URINE   MICROSCOPIC URINALYSIS         PROCEDURES:  None    CONSULTS:  None    CRITICAL CARE:  Please see attending note    FINAL IMPRESSION      1.  Vaginal bleeding          DISPOSITION / PLAN     DISPOSITION Decision To Discharge 09/12/2020 09:24:28 AM      PATIENT REFERRED TO:  [facility] ED  [address] [facility] [address] [phone]  Go to   If symptoms worsen    [address]  [address] [place] [address] [id number]  Call in 3 days      [place], APRN - [address] [id number] [phone]    Call in 1 day        DISCHARGE MEDICATIONS:  Discharge Medication List as of 9/12/2020  9:54 AM      START taking these medications    Details   metroNIDAZOLE (FLAGYL) 500 MG tablet Take 1 tablet by mouth 2 times daily for 7 days, Disp-14 tablet,R-0Print             Mohsen Viera DO  Emergency Medicine Resident    (Please note that portions of this note were completed with a voice recognition program.Efforts were made to edit the dictations but occasionally words are mis-transcribed.)        Mohsen Viera DO  Resident  09/12/20 5832

## 2020-09-13 LAB
C TRACH DNA GENITAL QL NAA+PROBE: NEGATIVE
N. GONORRHOEAE DNA: NEGATIVE
SPECIMEN DESCRIPTION: NORMAL

## 2020-10-02 ENCOUNTER — HOSPITAL ENCOUNTER (OUTPATIENT)
Age: 20
Setting detail: SPECIMEN
Discharge: HOME | End: 2020-10-02
Payer: MEDICAID

## 2020-10-02 ENCOUNTER — OFFICE VISIT (OUTPATIENT)
Dept: OBGYN CLINIC | Age: 20
End: 2020-10-02
Payer: MEDICAID

## 2020-10-02 ENCOUNTER — TELEPHONE (OUTPATIENT)
Dept: OBGYN CLINIC | Age: 20
End: 2020-10-02

## 2020-10-02 VITALS — SYSTOLIC BLOOD PRESSURE: 112 MMHG | BODY MASS INDEX: 31.07 KG/M2 | DIASTOLIC BLOOD PRESSURE: 68 MMHG | WEIGHT: 175.4 LBS

## 2020-10-02 LAB
DIRECT EXAM: ABNORMAL
Lab: ABNORMAL
SPECIMEN DESCRIPTION: ABNORMAL

## 2020-10-02 PROCEDURE — G8417 CALC BMI ABV UP PARAM F/U: HCPCS | Performed by: NURSE PRACTITIONER

## 2020-10-02 PROCEDURE — G8484 FLU IMMUNIZE NO ADMIN: HCPCS | Performed by: NURSE PRACTITIONER

## 2020-10-02 PROCEDURE — 99213 OFFICE O/P EST LOW 20 MIN: CPT | Performed by: NURSE PRACTITIONER

## 2020-10-02 PROCEDURE — G8428 CUR MEDS NOT DOCUMENT: HCPCS | Performed by: NURSE PRACTITIONER

## 2020-10-02 PROCEDURE — 1036F TOBACCO NON-USER: CPT | Performed by: NURSE PRACTITIONER

## 2020-10-02 RX ORDER — VALACYCLOVIR HYDROCHLORIDE 500 MG/1
500 TABLET, FILM COATED ORAL DAILY PRN
Status: ON HOLD | COMMUNITY
End: 2020-12-11 | Stop reason: HOSPADM

## 2020-10-02 NOTE — PROGRESS NOTES
Luane Mcburney is here with complaints of a white and thick vaginal discharge for2  weeks with  no odor, moderate  itching,  no burning and \"tender\" pain. No UTI sx, no pelvic pain  No change in partners  Exposure to STIs denies  O. Vulva no lesions  Vagina pink with moderate  Discharge  Cervix non friable no lesions  Affirm, GC/CT to lab  /68   Wt 175 lb 6.4 oz (79.6 kg)   LMP 09/08/2020   Breastfeeding No   BMI 31.07 kg/m²   ALLERGIES:  NKDA  General Appearance: This is a well Developed, well Nourished, well groomed female. Her BMI was reviewed. Nutritional decision making was discussed,   Skin:  There was a normal Inspection of the skin. There were no rashes or lesions. Lymphatic:  No Lymph Nodes were Palpable in the neck , axilla or groin. Neck and EENT:  The neck was supple. There were no masses   The thyroid was not enlarged and had no masses. Cardiovascular: The lungs were auscultated and found to be clear. There were no rales, rhonchi or wheezes. There was a good respiratory effort. The heart was in a regular rate and rhythm. There was no murmur appreciated. No calf tenderness, edema. Abdomen: The abdomen was soft and non-tender. There were good bowel sounds in all quadrants and there was no guarding, rebound or rigidity. The patient had a normal Orientation to: Time, Place, Person, and Situation  There is no Mood / Affect changes  The uterus was nontender. The  adnexa were palpated and noted no fullness, tenderness or masses in either region. Musculoskeletal:  Normal Gait and station was noted. Digits were evaluated without abnormal findings. Range of motion, stability and strength were evaluated and found to be appropriate for the patients   A. Vaginitis  options. P. Affirm collected and sent to lab  Will treat results accordingly  She was also counseled on her preventative health maintenance recommendations and follow-up.   RTC PRN

## 2020-10-02 NOTE — TELEPHONE ENCOUNTER
She can come in, but please put her on my schedule.   She can swab herself, but I would like to pop in to see her

## 2020-10-02 NOTE — TELEPHONE ENCOUNTER
21 y/o Pt calling to say that she is having burning and itching for last 2 days. Pt willing to come in and do a self swab today at 10.

## 2020-10-05 RX ORDER — FLUCONAZOLE 150 MG/1
150 TABLET ORAL ONCE
Qty: 1 TABLET | Refills: 0 | Status: SHIPPED | OUTPATIENT
Start: 2020-10-05 | End: 2020-10-05

## 2020-11-11 ENCOUNTER — TELEPHONE (OUTPATIENT)
Dept: OBGYN CLINIC | Age: 20
End: 2020-11-11

## 2020-11-11 NOTE — TELEPHONE ENCOUNTER
GYN pt  Has appointment 11-17-20 for vaginal swab    Offered appointments Friday. Patient works so she would not be able to come in. Patient complains of an extremely bad vaginal odor. Was wondering if we could call her something in or if she could come do a self swab. Also recommended reaching out to PCP for an appointment or an Urgent care.
I can't call anything in without knowing what I'm treating. I can squeeze her in quickly somewhere if she can find time.
Tried calling patient to schedule a swab appointment.  Phone just rang and then went into a busy signal.
Telemetry

## 2020-11-17 ENCOUNTER — OFFICE VISIT (OUTPATIENT)
Dept: OBGYN CLINIC | Age: 20
End: 2020-11-17
Payer: MEDICAID

## 2020-11-17 ENCOUNTER — HOSPITAL ENCOUNTER (OUTPATIENT)
Age: 20
Setting detail: SPECIMEN
Discharge: HOME | End: 2020-11-17
Payer: MEDICAID

## 2020-11-17 VITALS
HEIGHT: 63 IN | SYSTOLIC BLOOD PRESSURE: 102 MMHG | DIASTOLIC BLOOD PRESSURE: 78 MMHG | TEMPERATURE: 98.6 F | WEIGHT: 176.2 LBS | BODY MASS INDEX: 31.22 KG/M2

## 2020-11-17 LAB
DIRECT EXAM: ABNORMAL
Lab: ABNORMAL
SPECIMEN DESCRIPTION: ABNORMAL

## 2020-11-17 PROCEDURE — G8427 DOCREV CUR MEDS BY ELIG CLIN: HCPCS | Performed by: NURSE PRACTITIONER

## 2020-11-17 PROCEDURE — G8484 FLU IMMUNIZE NO ADMIN: HCPCS | Performed by: NURSE PRACTITIONER

## 2020-11-17 PROCEDURE — 99213 OFFICE O/P EST LOW 20 MIN: CPT | Performed by: NURSE PRACTITIONER

## 2020-11-17 PROCEDURE — G8417 CALC BMI ABV UP PARAM F/U: HCPCS | Performed by: NURSE PRACTITIONER

## 2020-11-17 PROCEDURE — 1036F TOBACCO NON-USER: CPT | Performed by: NURSE PRACTITIONER

## 2020-11-17 NOTE — PROGRESS NOTES
Vaginitis: Patient complains of an abnormal vaginal discharge for 1 week. Vaginal symptoms include local irritation and odor. Vulvar symptoms include discharge described as white and milky and odor. STI Risk: Very low risk of STD exposure   Discharge described as: thin . Menstrual pattern: She had been bleeding regularly. Contraception: none   has been having some pelvic pain/dyspareunia x 2 months- with deeper penetration  Suspect BV- will FU 6-8 weeks if pain not resolving  /78 (Site: Right Upper Arm, Position: Sitting, Cuff Size: Medium Adult)   Temp 98.6 °F (37 °C)   Ht 5' 3\" (1.6 m)   Wt 176 lb 3.2 oz (79.9 kg)   LMP 10/18/2020   Breastfeeding No   BMI 31.21 kg/m²     ALLERGIES:  none  O-  Physical Exam  Genitourinary:     General: Normal vulva. Copious amount thin, white discharge     Labia:         Right: No rash, tenderness, lesion or injury. Left: No rash, tenderness, lesion or injury. Vagina: Normal. No foreign body. No vaginal discharge, erythema, tenderness, bleeding or lesions. A.Vaginitis  options. P. Affirm collected and sent to lab  Will treat results accordingly  She was also counseled on her preventative health maintenance recommendations and follow-up.   RTO annual exam and PRN

## 2020-11-18 RX ORDER — METRONIDAZOLE 500 MG/1
500 TABLET ORAL 2 TIMES DAILY
Qty: 14 TABLET | Refills: 0 | Status: SHIPPED | OUTPATIENT
Start: 2020-11-18 | End: 2020-11-25

## 2020-12-09 ENCOUNTER — HOSPITAL ENCOUNTER (INPATIENT)
Age: 20
LOS: 2 days | Discharge: HOME | DRG: 751 | End: 2020-12-11
Attending: STUDENT IN AN ORGANIZED HEALTH CARE EDUCATION/TRAINING PROGRAM | Admitting: PSYCHIATRY & NEUROLOGY
Payer: MEDICAID

## 2020-12-09 DIAGNOSIS — R45.851 SUICIDAL IDEATION: Primary | ICD-10-CM

## 2020-12-09 PROBLEM — F32.A DEPRESSION WITH SUICIDAL IDEATION: Status: ACTIVE | Noted: 2020-12-09

## 2020-12-09 LAB
-: ABNORMAL
HCG, PREGNANCY URINE (POC): POSITIVE
SARS-COV-2, RAPID: NOT DETECTED
SARS-COV-2: NORMAL
SARS-COV-2: NORMAL
SOURCE: NORMAL

## 2020-12-09 PROCEDURE — 99285 EMERGENCY DEPT VISIT HI MDM: CPT

## 2020-12-09 PROCEDURE — 1240000000 HC EMOTIONAL WELLNESS R&B

## 2020-12-09 PROCEDURE — U0002 COVID-19 LAB TEST NON-CDC: HCPCS

## 2020-12-09 PROCEDURE — 6370000000 HC RX 637 (ALT 250 FOR IP): Performed by: PSYCHIATRY & NEUROLOGY

## 2020-12-09 PROCEDURE — 81025 URINE PREGNANCY TEST: CPT

## 2020-12-09 RX ORDER — TRAZODONE HYDROCHLORIDE 50 MG/1
50 TABLET ORAL NIGHTLY PRN
Status: DISCONTINUED | OUTPATIENT
Start: 2020-12-09 | End: 2020-12-10

## 2020-12-09 RX ORDER — ACETAMINOPHEN 325 MG/1
650 TABLET ORAL EVERY 4 HOURS PRN
Status: DISCONTINUED | OUTPATIENT
Start: 2020-12-09 | End: 2020-12-11 | Stop reason: HOSPADM

## 2020-12-09 RX ADMIN — TRAZODONE HYDROCHLORIDE 50 MG: 50 TABLET ORAL at 20:52

## 2020-12-09 ASSESSMENT — ENCOUNTER SYMPTOMS
COUGH: 0
VOMITING: 0
FACIAL SWELLING: 0
RHINORRHEA: 0
PHOTOPHOBIA: 0
EYE ITCHING: 0
SHORTNESS OF BREATH: 0
NAUSEA: 0
ABDOMINAL PAIN: 0
COLOR CHANGE: 0
DIARRHEA: 0

## 2020-12-09 ASSESSMENT — SLEEP AND FATIGUE QUESTIONNAIRES: DO YOU USE A SLEEP AID: NO

## 2020-12-09 ASSESSMENT — PATIENT HEALTH QUESTIONNAIRE - PHQ9: SUM OF ALL RESPONSES TO PHQ QUESTIONS 1-9: 10

## 2020-12-09 ASSESSMENT — LIFESTYLE VARIABLES: HISTORY_ALCOHOL_USE: NO

## 2020-12-09 ASSESSMENT — PAIN - FUNCTIONAL ASSESSMENT: PAIN_FUNCTIONAL_ASSESSMENT: 0-10

## 2020-12-09 NOTE — ED PROVIDER NOTES
EMERGENCY DEPARTMENT ENCOUNTER    Pt Name: Gera Burrows  MRN: [id number]  [place] 2000  Date of evaluation: 12/9/20  CHIEF COMPLAINT       Chief Complaint   Patient presents with    Mental Health Problem     HISTORY OF PRESENT ILLNESS   HPI  39-year-old female presents for chief complaint of suicidal ideation. Patient states she has had this intermittently over the past year since her boyfriend committed suicide by hanging. It is intensified recently over the past several weeks due to the loss of a job. She does not have any auditory hallucinations. She has a plan to hang herself or kill herself in the quickest way possible. No actual attempted self-harm today. No other recent illness. No physical complaints. No history of inpatient psychiatric hospitalizations. REVIEW OF SYSTEMS     Review of Systems   Constitutional: Negative for chills and fatigue. HENT: Negative for facial swelling, postnasal drip and rhinorrhea. Eyes: Negative for photophobia and itching. Respiratory: Negative for cough and shortness of breath. Cardiovascular: Negative for chest pain and leg swelling. Gastrointestinal: Negative for abdominal pain, diarrhea, nausea and vomiting. Genitourinary: Negative for dysuria, flank pain and hematuria. Musculoskeletal: Negative for arthralgias and joint swelling. Skin: Negative for color change and rash. Neurological: Negative for dizziness, numbness and headaches. Psychiatric/Behavioral: Positive for dysphoric mood and suicidal ideas. Negative for agitation, behavioral problems and self-injury.      PASTMEDICAL HISTORY     Past Medical History:   Diagnosis Date    HSV-2 infection     LGSIL of cervix of undetermined significance 05/15/2020    Pelvic inflammatory disease     Psychiatric problem      Past Problem List  Patient Active Problem List   Diagnosis Code    Obesity  O99.210    Multigravida in third trimester Z34.83    Rh+/RI/GBSpos O09.93    Short femur of fetus  O35. 8XX0     15 F Ap/9 Wt: 6#9 O80    PID (acute pelvic inflammatory disease) N73.0    High risk pregnancy due to smoking in first trimester O99.331    Depression with suicidal ideation F32.9, R45.851     SURGICAL HISTORY       Past Surgical History:   Procedure Laterality Date    ADENOIDECTOMY      APPENDECTOMY      FRACTURE SURGERY Left     lt forarm (denies hardware)    TYMPANOSTOMY TUBE PLACEMENT       CURRENT MEDICATIONS       Current Discharge Medication List      CONTINUE these medications which have NOT CHANGED    Details   valACYclovir (VALTREX) 500 MG tablet Take 500 mg by mouth daily as needed            ALLERGIES     has No Known Allergies. FAMILY HISTORY     She indicated that her mother is alive. She indicated that her father is alive. SOCIAL HISTORY       Social History     Tobacco Use    Smoking status: Never Smoker    Smokeless tobacco: Never Used   Substance Use Topics    Alcohol use: No     Comment: socially    Drug use: No     PHYSICAL EXAM     INITIAL VITALS: /74   Pulse 89   Temp 98.9 °F (37.2 °C) (Oral)   Resp 14   Ht 5' 3\" (1.6 m)   Wt 175 lb (79.4 kg)   LMP 10/18/2020   SpO2 99%   BMI 31.00 kg/m²    Physical Exam  Constitutional:       Appearance: She is normal weight. HENT:      Head: Normocephalic and atraumatic. Eyes:      Extraocular Movements: Extraocular movements intact. Pupils: Pupils are equal, round, and reactive to light. Neck:      Musculoskeletal: Normal range of motion and neck supple. Cardiovascular:      Rate and Rhythm: Normal rate and regular rhythm. Pulmonary:      Effort: Pulmonary effort is normal.      Breath sounds: Normal breath sounds. Abdominal:      General: Abdomen is flat. There is no distension. Palpations: There is no mass. Musculoskeletal: Normal range of motion. General: No swelling. Skin:     General: Skin is warm and dry.    Neurological:      General: No focal deficit present. Mental Status: She is alert. Mental status is at baseline. Psychiatric:      Comments: Suicidal.  Flat affect. Not responding to internal stimuli. MEDICAL DECISION MAKIN-year-old female presents for evaluation of suicidal ideation. No medical complaints. Stable vital signs and unremarkable physical exam.  Will check urine pregnancy and coronavirus swab. Medically cleared for inpatient psychiatric hospitalization for management of active suicidality. CRITICAL CARE:       PROCEDURES:    Procedures    DIAGNOSTIC RESULTS   EKG:All EKG's are interpreted by the Emergency Department Physician who either signs or Co-signs this chart in the absence of a cardiologist.        RADIOLOGY:All plain film, CT, MRI, and formal ultrasound images (except ED bedside ultrasound) are read by the radiologist, see reports below, unless otherwisenoted in MDM or here. No orders to display     LABS: All lab results were reviewed by myself, and all abnormals are listed below. Labs Reviewed   POCT HCG, PREGNANCY, UR - Abnormal; Notable for the following components:       Result Value    HCG, Pregnancy Urine (POC) POSITIVE (*)     All other components within normal limits   COVID-19   POCT URINE PREGNANCY       EMERGENCY DEPARTMENTCOURSE:         Vitals:    Vitals:    20 1750 20   BP: 113/62 113/74   Pulse: 77 89   Resp: 12 14   Temp: 97.9 °F (36.6 °C) 98.9 °F (37.2 °C)   TempSrc: Temporal Oral   SpO2: 99%    Weight: 176 lb (79.8 kg) 175 lb (79.4 kg)   Height: 5' 3\" (1.6 m) 5' 3\" (1.6 m)       The patient was given the following medications while in the emergency department:  Orders Placed This Encounter   Medications    acetaminophen (TYLENOL) tablet 650 mg    traZODone (DESYREL) tablet 50 mg     CONSULTS:  IP CONSULT TO HISTORY AND PHYSICAL    FINAL IMPRESSION      1.  Suicidal ideation          DISPOSITION/PLAN   DISPOSITION Decision To Admit 2020 06:15:08 PM      PATIENT REFERRED TO:  No follow-up provider specified.   DISCHARGE MEDICATIONS:  Current Discharge Medication List        Ney Wallace MD  Attending Emergency Physician                    Ney Wallace MD  12/09/20 6610

## 2020-12-09 NOTE — PROGRESS NOTES
Medication History completed:    No changes to medication list at this encounter. Medications confirmed with patient. She states she only uses the valacyclovir when she is experiencing a flare.      Thank you,  Serjio Caputo, PharmD, BCPS  [phone]

## 2020-12-09 NOTE — ED NOTES
Provisional Diagnosis:   Depression    Psychosocial and Contextual Factors:     Patient has financial issues. Patient has relationship issues. C-SSRS Summary:      Patient: X  Family:   Agency:         Present Suicidal Behavior:  X    Verbal:  Patient reports suicidal ideations with thoughts of hanging herself. Attempt: Patient denies. Past Suicidal Behavior: X    Verbal: Patient reports feeling suicidal in the past.     Attempt: Patient reports attempts as a teenager. Substance Abuse: Alcohol     Self-Injurious/Self-Mutilation: Patient denies. Trauma Identified:   Patient reports her boyfriend took his life a year ago. Protective Factors:    Patient has housing. Patient has insurance. Risk Factors:    Patient is not linked or taking psych medications. Patient does not have employment. Patient has poor coping skills. Clinical Summary:    Patient is a 23year old  female that is brought to the ED today via family for suicidal ideations. Patient reports she has been dealing with depression for the past year. Patient reports suicidal ideations on and off over the past year. Patient reports more recently she has had suicidal thoughts and fears she will act on them. Patient reports a plan to hang herself. Patient states \"In the past I could think of my kids but its getting harder. \" Patient reports previous attempts \"a long time ago. \" Patient denies H/I at this time. Patient denies auditory or visual hallucinations. Patient denies any previous mental health diagnosis. Patient denies being linked with a mental health provider or previous inpatient psychiatric admissions. Patient denies taking any psychiatric medications. Patient reports a year ago her boyfriend at the time took his life and she has been dealing with depression on her own since that time. Patient reports she was recently in another relationship but it has ended.  Patient is unsure if she is pregnant today. Patient reports having housing for her and her 2 children (7 and 3 yrs old). Patient reports she is concerned about finances as she recently lost her job. Patient reports \"social\" drinking on average 5 times a week. Patient denies any other drug use. Patient denies any legal issues. Patient is voluntary. Level of Care Disposition:    Writer coordinated with [facility] and consulted Dr. Estella Henriquez, psychiatrist. Patient to be admitted to the [facility] for safety and stabilization.

## 2020-12-10 ENCOUNTER — TELEPHONE (OUTPATIENT)
Dept: OBGYN CLINIC | Age: 20
End: 2020-12-10

## 2020-12-10 PROCEDURE — 99223 1ST HOSP IP/OBS HIGH 75: CPT | Performed by: PSYCHIATRY & NEUROLOGY

## 2020-12-10 PROCEDURE — 6370000000 HC RX 637 (ALT 250 FOR IP): Performed by: PSYCHIATRY & NEUROLOGY

## 2020-12-10 PROCEDURE — 1240000000 HC EMOTIONAL WELLNESS R&B

## 2020-12-10 RX ORDER — SERTRALINE HYDROCHLORIDE 25 MG/1
25 TABLET, FILM COATED ORAL DAILY
Status: DISCONTINUED | OUTPATIENT
Start: 2020-12-10 | End: 2020-12-11

## 2020-12-10 RX ORDER — TRAZODONE HYDROCHLORIDE 50 MG/1
50 TABLET ORAL NIGHTLY PRN
Status: DISCONTINUED | OUTPATIENT
Start: 2020-12-10 | End: 2020-12-11 | Stop reason: HOSPADM

## 2020-12-10 RX ADMIN — TRAZODONE HYDROCHLORIDE 50 MG: 50 TABLET ORAL at 22:24

## 2020-12-10 RX ADMIN — SERTRALINE HYDROCHLORIDE 25 MG: 25 TABLET ORAL at 17:10

## 2020-12-10 ASSESSMENT — LIFESTYLE VARIABLES: HISTORY_ALCOHOL_USE: NO

## 2020-12-10 NOTE — BH NOTE
`[facility]  Admission Note     Admission Type:   Admission Type: Voluntary    Reason for admission:  Reason for Admission: Suicidal ideations with a plan to hang herself. Increased hopelessness and helplessness    PATIENT STRENGTHS:  Strengths: Communication, No significant Physical Illness, Positive Support    Patient Strengths and Limitations:  Limitations: Hopeless about future, Difficulty problem solving/relies on others to help solve problems    Addictive Behavior:   Addictive Behavior  In the past 3 months, have you felt or has someone told you that you have a problem with:  : None  Do you have a history of Chemical Use?: No  Do you have a history of Alcohol Use?: No  Do you have a history of Street Drug Abuse?: No  Histroy of Prescripton Drug Abuse?: No    Medical Problems:   Past Medical History:   Diagnosis Date    HSV-2 infection     LGSIL of cervix of undetermined significance 05/15/2020    Pelvic inflammatory disease     Psychiatric problem        Status EXAM:  Status and Exam  Normal: No  Facial Expression: Sad, Worried  Affect: Congruent  Level of Consciousness: Alert  Mood:Normal: No  Mood: Anxious, Helpless, Depressed  Motor Activity:Normal: Yes  Interview Behavior: Cooperative  Preception: [place] to Person, Obadiah Roshni to Time, [place] to Place, [place] to Situation  Attention:Normal: No  Attention: Distractible  Thought Processes: Circumstantial  Thought Content:Normal: No  Thought Content: Preoccupations  Hallucinations: None  Delusions: No  Memory:Normal: Yes  Insight and Judgment: No  Insight and Judgment: Poor Judgment  Present Suicidal Ideation: Yes(Intermittent fleeting; no plan. Contracts for safety)  Present Homicidal Ideation: No    Pt admitted with followings belongings:  Dentures: None  Vision - Corrective Lenses: None  Hearing Aid: None  Jewelry: None  Body Piercings Removed: N/A  Clothing:  Footwear, Jacket / coat, Pants, Shirt, Other (Comment), Socks(see sheet)  Were All Patient Medications Collected?: Not Applicable  Other Valuables: Cell phone, Jazmin Uribe, Other (Comment)(see sheet)     Valuables placed in safe in security envelope, number:  [id number]. Patient's home medications were reviewed. Patient oriented to surroundings and program expectations and copy of patient rights given. Received admission packet:  yes. Consents reviewed and signed. Patient verbalizes an understanding. Admit 12/9/2020 1932  Patient admitted to the [facility] room 226-2 per provider order under voluntary status. Pt scanned with metal detector. Nourishment provided. Patient brought in by family. Recent breakup with current boyfriend. Also loss boyfriend last year due to suicide. Increased depression over the last year. Denies AOD abuse. 1st psych admit. No mental health hx; not linked; no home medications. Positive pregnancy test in ER. Fleeting SI, contracts. Denies AVH. Signed all consents. Cooperative, but very tearful. MD paged for orders. Will continue to monitor patient for safety and behavior.                Dana Ferrara RN

## 2020-12-10 NOTE — CARE COORDINATION
BHI Biopsychosocial Assessment    Current Level of Psychosocial Functioning     Independent   Dependent    Minimal Assist X    Psychosocial High Risk Factors (check all that apply)    Unable to obtain meds   Chronic illness/pain    Substance abuse   Lack of Family Support X  Financial stress X   Isolation X  Inadequate Community Resources X  Suicide attempt(s)   Not taking medications X  Victim of crime   Developmental Delay  Unable to manage personal needs X   Age 72 or older   Homeless  No transportation   Readmission within 30 days  Unemployment  Traumatic Event    Psychiatric Advanced Directives: none reported     Family to Involve in Treatment: Pt reports that her sister is supportive and involved in her care. Sexual Orientation:  Heterosexual     Patient Strengths: adequate housing, family support, insurance,     Patient Barriers: presenting on admission with suicidal ideation with a plan to hang herself, PT recently found out in ED that she is pregnant, PT BF passed away by suicide 1 year ago, Pt recently lost her job, [facility] and not on medications. Opiate Education Provided: N/A PT denies and does not have a documented history of Opiate or Heroin use/abuse. CMHC/mental health history: PT is not currently linked with [facility], is Requesting to be linked with [facility] [address] at discharge. This is PT's first Psychiatric hospitalization, she denies any mental health history. PT denies any alcohol or illicit drug use. Plan of Care   medication management, group/individual therapies, family meetings, psycho -education, treatment team meetings to assist with stabilization, referral to community resources. Initial Discharge Plan:  PT reports a plan to return to her home in [place] following discharge. PT also reports a plan to follow up with Outpatient Treatment at [facility]. At discharge.      Clinical Summary:  Patient is a 23year old  female who presents on admission with suicidal ideation with a plan to hang herself.       Patient reports she has been dealing with depression for the past year. Patient reports suicidal ideations on and off over the past year. PT reports experiencing an increase in symptoms of Depression and endorses feelings of helplessness, hopelessness, and worthlessness. Patient reports more recently she has had suicidal thoughts and fears she will act on them. Patient reports previous attempts \"a long time ago. \" Patient denies Homicidal ideation  at this time. Patient denies auditory or visual hallucinations.      Patient denies any previous mental health diagnosis. Patient denies being linked with a mental health provider or previous inpatient psychiatric admissions. Patient denies taking any psychiatric medications in the past. Patient  is requesting to be linked with [facility] [address] at discharge.     Patient reports a year ago her boyfriend at the time took his life and she has been dealing with depression on her own since that time. Patient reports she was recently in another relationship but it has recently ended. IT was documented in ED notes that PT had a positive pregnancy test and was informed of this at the ED.    Patient reports having housing for her and her 2 children (7 and 3 yrs old). Patient reports she is concerned about finances as she recently lost her job. Patient denies any illict  drug use.   Patient denies any legal issues.

## 2020-12-10 NOTE — H&P
HISTORY and Mario English [id number]       NAME:  Sofia Willingham  MRN: [id number]   YOB: 2000   Date: 12/10/2020   Age: 23 y.o. Gender: female     COMPLAINT AND PRESENT HISTORY:    Sofia Willingham is a 23 y.o.  female, admitted because of increasing depression with suicidal ideation. According to ED/ Admission notes, patient came in with suicidal ideations with plans to hang herself. Reports that patient had anniversary of her former boyfriend committing suicide by hanging himself. Also intensified by the loss of job and recent break-up with her boyfriend. Patient also notified of a unknown positive pregnancy. And has a positive hCG with a quantitative [id number]. Upon speaking to patient she appeared very somnolent and spoke with low tone. Patient verbalized to writer that she will be seeking for plans of . Patient states she has 2 previous children. Its to suicidal thoughts and history of suicide attempts in the past.  Patient states she is never been on any mental health medications before. Patient did not offer much information to writer. Patient admits to having hypersomnia and racing thoughts. Patient denies any alcohol or substance abuse. Patient states she owns her own place. Patient denies any somatic complaints. No significant lab values or procedures. No  chest pain or  shortness of breath. No fever/chills. Please see patient's psychiatric hx for more information.     DIAGNOSTIC RESULTS   U/A:  Lab Results   Component Value Date    COLORU YELLOW 2020    WBCUA 2 TO 5 2020    RBCUA 50  2020    MUCUS NOT REPORTED 2020    BACTERIA NOT REPORTED 2020    SPECGRAV 1.009 2020    LEUKOCYTESUR NEGATIVE 2020    GLUCOSEU NEGATIVE 2020    AMORPHOUS NOT REPORTED 2020       PAST MEDICAL HISTORY     Past Medical History:   Diagnosis Date    Anxiety     Depression     HSV-2 infection     LGSIL of file prior to encounter. Current Outpatient Medications on File Prior to Encounter   Medication Sig Dispense Refill    valACYclovir (VALTREX) 500 MG tablet Take 500 mg by mouth daily as needed                         General health:  Fairly good. No fever or chills. Skin:  No itching, redness or rash. Head, eyes, ears, nose, throat:  No headache, epistaxis, rhinorrhea hearing loss or sore throat. Neck:  No pain, stiffness or masses. Cardiovascular/Respiratory system:  No chest pain, palpitation, shortness of breath, coughing or expectoration. Gastrointestinal tract: No abdominal pain, nausea, vomiting, dysphagia, diarrhea or constipation. Genitourinary:  No burning on micturition. No hesitancy, urgency, frequency or discoloration of urine. Locomotor:  No bone or joint pains. No swelling or deformities. Neuropsychiatric:  See HPI. GENERAL PHYSICAL EXAM:     Vitals: BP (!) 92/50   Pulse 79   Temp 99.1 °F (37.3 °C)   Resp 14   Ht 5' 3\" (1.6 m)   Wt 175 lb (79.4 kg)   LMP 10/18/2020   SpO2 99%   BMI 31.00 kg/m²  Body mass index is 31 kg/m². Pt was examined with a nurse present in the room. GENERAL APPEARANCE:  Yessi Schirmer is 23 y.o.,  female, moderately obese, nourished, conscious, alert. Does not appear to be distress or pain at this time. SKIN:  Warm, dry, no cyanosis or jaundice. Moderate amount of erythemic comedones to face     HEAD:  Normocephalic, atraumatic, no swelling or tenderness. EYES:  Pupils equal, reactive to light, Conjunctiva is clear, EOMs intact juan. eyelids WNL. EARS:  No discharge, no marked hearing loss. NOSE:  No rhinorrhea, epistaxis or septal deformity. THROAT:  Not congested. No ulceration bleeding or discharge. NECK:  No stiffness, trachea central.  No palpable masses or L.N.      CHEST:  Symmetrical and equal on expansion. HEART:  Regular rate and rhythm.  S1 > S2, No audible murmurs or gallops. LUNGS:  Equal on expansion, normal breath sounds. No adventitious sounds. ABDOMEN:  Obese. Soft on palpation. No localized tenderness. No guarding or rigidity. No palpable organomegaly. LYMPHATICS:  No palpable cervical Lymphadenopathy. LOCOMOTOR, BACK AND SPINE:  No tenderness or deformities. EXTREMITIES:  Symmetrical, no pretibial edema. Kaylyns sign negative. No discoloration or ulcerations. NEUROLOGIC:  The patient is conscious, alert, oriented,Cranial nerve II-XII intact, taste and smell were not examined. No apparent focal sensory or motor deficits. Muscle strength equal Adarsh. No facial droop, tongue protrudes centrally, no slurring of the speech. PROVISIONAL DIAGNOSES:      Active Problems:    Depression with suicidal ideation  Resolved Problems:    * No resolved hospital problems. *  ASSESSMENT AND PLAN    1. Depression w/suicidal ideation -Continue current tx plan per psychiatry    2. No medical history- No acute medical issues at this time. Unexpected pregnancy - pt plans for     3. Continue routine vital sign monitoring    4.  Medications to be reviewed per attending and continued per admitting service      LEELA Escobar CNP on 12/10/2020 at 12:32 PM

## 2020-12-10 NOTE — GROUP NOTE
Group Therapy Note    Date: 12/10/2020    Group Start Time: 1000  Group End Time: 4950  Group Topic: Cognitive Skills    EULALIO Aj, CTRS    Pt did not attend 1000 cognitive skills group d/t resting in room despite staff invitation to attend. 1:1 talk time offered as alternative to group session, pt declined.               Signature:  Bill Whitlock

## 2020-12-10 NOTE — PLAN OF CARE
[id number] Jp Villatoro  Initial Interdisciplinary Treatment Plan NO      Original treatment plan Date & Time: 12/10/2020 0815    Admission Type:  Admission Type: Voluntary    Reason for admission:   Reason for Admission: Suicidal ideations with a plan to hang herself. Increased hopelessness and helplessness    Estimated Length of Stay:  5-7days  Estimated Discharge Date: to be determined by physician    PATIENT STRENGTHS:  Patient Strengths:Strengths: Communication, No significant Physical Illness, Positive Support  Patient Strengths and Limitations:Limitations: Hopeless about future, Difficulty problem solving/relies on others to help solve problems  Addictive Behavior: Addictive Behavior  In the past 3 months, have you felt or has someone told you that you have a problem with:  : None  Do you have a history of Chemical Use?: No  Do you have a history of Alcohol Use?: No  Do you have a history of Street Drug Abuse?: No  Histroy of Prescripton Drug Abuse?: No  Medical Problems:  Past Medical History:   Diagnosis Date    HSV-2 infection     LGSIL of cervix of undetermined significance 05/15/2020    Pelvic inflammatory disease     Psychiatric problem      Status EXAM:Status and Exam  Normal: No  Facial Expression: Sad, Worried  Affect: Congruent  Level of Consciousness: Alert  Mood:Normal: No  Mood: Anxious, Helpless, Depressed  Motor Activity:Normal: Yes  Interview Behavior: Cooperative  Preception: [place] to Person, Larina Fret to Time, [place] to Place, [place] to Situation  Attention:Normal: No  Attention: Distractible  Thought Processes: Circumstantial  Thought Content:Normal: No  Thought Content: Preoccupations  Hallucinations: None  Delusions: No  Memory:Normal: Yes  Insight and Judgment: No  Insight and Judgment: Poor Judgment  Present Suicidal Ideation: Yes(Intermittent fleeting; no plan.  Contracts for safety)  Present Homicidal Ideation: No    EDUCATION:   Learner Progress Toward Treatment Goals: reviewed group plans and strategies for care    Method:group therapy, medication compliance, individualized assessments and care planning    Outcome: needs reinforcement    PATIENT GOALS: to be discussed with patient within 72 hours    PLAN/TREATMENT RECOMMENDATIONS:     continue group therapy , medications compliance, goal setting, individualized assessments and care, continue to monitor pt on unit      SHORT-TERM GOALS:   Time frame for Short-Term Goals: 5-7 days    LONG-TERM GOALS:  Time frame for Long-Term Goals: 6 months  Members Present in Team Meeting: See Signature Sheet    Phuong Silva

## 2020-12-10 NOTE — CONSULTS
[address]    Patient Name: Adrian Sanchez     Patient : 2000  Room/Bed: 0226/0226-02  Admission Date/Time: 2020  5:42 PM  Primary Care Physician: No primary care provider on file. Consulting Provider: Mikayla Villeda MD  Reason for Consult: Pregnancy    CC:   Chief Complaint   Patient presents with    Mental Health Problem                HPI: Adrian Sanchez is a 23 y.o. female [id number] is currently admitted to [facility] for suicidal ideations. Her previously boyfriend committed suicide one year ago and she is feeling depressed given the anniversary of his death. In the ER, the patient had a positive pregnancy test. Since admission to the [facility] she is feeling better and currently denies suicidal or homicidal ideations. This pregnancy was unplanned and is not desired. Her LMP was 10/13/20 and she plans to terminate this pregnancy. She is established with an OB/GYN and recently saw Leydi Lee CNP on 20 and treated for BV. She had cultures done on that day and 10/2/20 which were negative for STD. Patient's last menstrual period was 10/13/2020 (exact date). The patient currently has no complaints including fever, chills, headache, change in vision, RUQ pain, chest pain, dyspnea, abdominal pain, N/V, urinary symptoms or change in her vaginal discharge. She is not concerned for STD at this time. She denies any vaginal bleeding or cramping since her last period. Today she is declining HCG quant or TVUS due to wanting to terminate the pregnancy.     REVIEW OF SYSTEMS:   Constitutional: negative fever, negative chills, negative weight changes   HEENT: negative visual disturbances, negative headaches, negative dizziness, negative hearing loss  Breast: Negative breast abnormalities, negative breast lumps, negative nipple discharge  Respiratory: negative dyspnea, negative cough, negative SOB  Cardiovascular: negative chest pain,  negative palpitations, negative arrhythmia, negative syncope   Gastrointestinal: negative abdominal pain, negative RUQ pain, negative N/V, negative diarrhea, negative constipation, negative bowel changes, negative heartburn   Genitourinary: negative dysuria, negative hematuria, negative urinary incontinence, negative vaginal discharge, negative vaginal bleeding or spotting  Dermatological: negative rash, negative pruritis, negative mole or other skin changes  Hematologic: negative bruising  Immunologic/Lymphatic: negative recent illness, negative recent sick contact  Musculoskeletal: negative back pain, negative myalgias, negative arthralgias  Neurological:  negative dizziness, negative migraines, negative seizures, negative weakness  Behavior/Psych: positive depression, negative anxiety, negative SI, negative HI  _______________________________________________________________________      OBSTETRICAL HISTORY:   OB History    Para Term  AB Living   6 2 2 0 3 2   SAB TAB Ectopic Molar Multiple Live Births   1 0 0 0 0 2      # Outcome Date GA Lbr Jean-Pierre/2nd Weight Sex Delivery Anes PTL Lv   6 Current            5 AB 2020           4 SAB 2019           3 AB 2018           2 Term 17 38w6d 13:39 / 00:28 6 lb 9.3 oz (2.985 kg) F Vag-Spont None N GIGI      Apgar1: 9  Apgar5: 9   1 Term 13 40w0d  6 lb 8 oz (2.948 kg) F    GGII       PAST MEDICAL HISTORY:   has a past medical history of Anxiety, Depression, HSV-2 infection, LGSIL of cervix of undetermined significance, Pelvic inflammatory disease, and Psychiatric problem. PAST SURGICAL HISTORY:   has a past surgical history that includes fracture surgery (Left); Tympanostomy tube placement; Adenoidectomy; and Appendectomy.     ALLERGIES:  Allergies as of 2020    (No Known Allergies)       MEDICATIONS:  Current Facility-Administered Medications   Medication Dose Route Frequency Provider Last Rate Last Dose    acetaminophen (TYLENOL) tablet 650 mg  650 mg Oral Q4H PRN Mahan Bickers MD Rena        traZODone (DESYREL) tablet 50 mg  50 mg Oral Nightly PRN Christiane Nino MD   50 mg at 12/09/20 2052       FAMILY HISTORY:  family history includes No Known Problems in her father and mother. SOCIAL HISTORY:   reports that she has never smoked. She has never used smokeless tobacco. She reports that she does not drink alcohol or use drugs. ________________________________________________________________________                                    Rainell Chi:  Vitals:    12/09/20 1750 12/09/20 2006 12/10/20 0730   BP: 113/62 113/74 (!) 92/50   Pulse: 77 89 79   Resp: 12 14 14   Temp: 97.9 °F (36.6 °C) 98.9 °F (37.2 °C) 99.1 °F (37.3 °C)   TempSrc: Temporal Oral    SpO2: 99%     Weight: 176 lb (79.8 kg) 175 lb (79.4 kg)    Height: 5' 3\" (1.6 m) 5' 3\" (1.6 m)                                                                                                                                                                               PHYSICAL EXAM:     General Appearance: Appears healthy. Alert; in no acute distress. Pleasant. Skin: Skin color, texture, turgor normal. No rashes or lesions. Respiratory: Normal expansion. Clear to auscultation. No rales, rhonchi, or wheezing. Cardiovascular: normal, regular rate and rhythm  Abdomen: soft, non-tender, non-distended, no right upper quadrant tenderness and no CVA tenderness, no rebound, guarding, or rigidity  Pelvic Exam: declined  Musculoskeletal: no gross abnormalities  Extremities: non-tender BLE and non-edematous  Psych:  Slightly flattened affect, oriented to time, place and person, mood and affect are within normal limits       LAB RESULTS:  Results for orders placed or performed during the hospital encounter of 12/09/20   COVID-19    Specimen: Other   Result Value Ref Range    SARS-CoV-2          SARS-CoV-2, Rapid Not Detected Not Detected    Source . NASOPHARYNGEAL SWAB     SARS-CoV-2         POCT HCG, Prenancy, Ur   Result Value Ref Range HCG, Pregnancy Urine (POC) POSITIVE (A) NEGATIVE    - NOT REPORTED        ASSESSMENT & PLAN:    Antonio Augustin is a 23 y.o. female [id number] with newly diagnosed, undesired pregnancy   - VSS, afebrile   - HCG positive, no quant available   - EGA by LMP is 8w2d   - This is not a desired pregnancy and she plans to terminate the pregnancy   - Patient declines HCG quant or TVUS   - Patient is not concerned for STD and declines a pelvic exam   - Patient is well established with an OB/GYN and was seen last month   - Most recent GC/C negative 10/2/20, vaginitis done 20 positive for BV which was treated   - Offered support to the patient, encouraged her to follow up with her OB/GYN on discharge for concerns   - Patient has no pregnancy-related complaints at this time   - Patient informed to reach out if she changes her mind regarding her plan for the pregnancy   - If the pregnancy is desired, will obtain HCG quant, prenatal labs and TVUS   - Patient denies any cramping or bleeding, encouraged her to reach out if she experiences any of these   - Defer all management of her suicidal ideations to primary team   - OB/GYN will sign off at this time, please page with any further concerns or questions    Patient Active Problem List    Diagnosis Date Noted    Depression with suicidal ideation 2020    High risk pregnancy due to smoking in first trimester 2019    PID (acute pelvic inflammatory disease) 2019     15 F Ap/9 Wt: 6#9 2017    Short femur of fetus  2017    Rh+/RI/GBSpos 2017    Multigravida in third trimester 2017     Does not want an epidural, states she will let know if she decides to, interested in nitrous, breastfeeding, okay with residents and med students.  Obesity  2017       Plan discussed with Dr. Hilton Severe, who is agreeable.      Attending's Name: Dr. Lanie Lehman DO  Ob/Gyn Resident  Pager: [phone] [address] [facility]

## 2020-12-10 NOTE — PLAN OF CARE
Problem: Altered Mood, Depressive Behavior:  Goal: Ability to disclose and discuss suicidal ideas will improve  Description: Ability to disclose and discuss suicidal ideas will improve  Outcome: Ongoing  Note: Patient is cooperative but tearful, she's expresses thoughts of fleeting suicidal ideations but has no plan and contracts for safety. Also, she verbalizes feeling depressed and anxious with racing thoughts regarding her personal relationship and unexpected pregnancy. Patient was provided with tips to aid in managing her depression and anxiety but encouraged to attend groups for further support and coping mechanism.
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
## 2020-12-10 NOTE — SUICIDE SAFETY PLAN
SAFETY PLAN    A suicide Safety Plan is a document that supports someone when they are having thoughts of suicide. Warning Signs that indicate a suicidal crisis may be developing: What (situations, thoughts, feelings, body sensations, behaviors, etc.) do you experience that lets you know you are beginning to think about suicide? 1. Go off medications  2. Mood is depressed and start to feel sad, hopeless, helpless, guilty, decline in self-esteem, excess worry, no interest in doing any pleasurable activities, unable to concentrate  3. Begin to cry over the smallest of things  4. Not eating or sleeping as normal  5. Relationship issues start happening  6. I become angry and start a fight  7. When I dont listen or respond to people in a good, positive way    Internal Coping Strategies:  What things can I do (relaxation techniques, hobbies, physical activities, etc.) to take my mind off my problems without contacting another person? 1. Go to hospital discharge appointments and follow-up with community mental health counseling  2. Talk with other people  3. Learn to identify and control your emotions by new ways  4. Think before you speak or act; walk away from the situation  5. Join a support group in person or on Social Media  6. Take a time-out  7. Take deep breaths; use relaxation techniques  8. Get some exercise; go for a walk  9. Read; listen to music; watch a funny movie     People and social settings that provide distraction: Who can I call or where can I go to distract me? Sheri Wheat [phone]    People whom I can ask for help: Who can I call when I need help - for example, friends, family, clergy, someone else? [facility]    Professionals or [address] [place] [address] I can contact during a crisis: Who can I call for help - for example, my doctor, my psychiatrist, my psychologist, a mental health provider, a suicide hotline?   [facility] [phone]  Suicide Prevention Lifeline: 7-201-479-TALK (5482)  [facility] 2-1-17 (958) 955-0005 or (834) 532-5492  [facility], 24-hour Crisis Services, Central Access: (257) 780-2051, [address], [address]  CHAD (National Association of Mental Illness) groups and support, [address] (214) 574-3062  4. [address] [place] [address] Emergency Services -  for example, Community Mental          [facility] 141, [facility], Crisis line: [address] [facility] 83-[facility] Crisis Response Team (Crisis Intervention Team - [place]), [phone] or 9-1-1  [facility] ChristinaBeaver, [address] Association of Mental Illness, [phone]  [address] [facility], 4-711-351-HELP (7480) [address] [facility], Text 4HOPE to 696495 to connect with a crisis counselor  [address] [facility], [phone]  Chrys Eisenmenger ([place], [address]), [phone]  [facility], [address] [facility], [place], [facility] 124  [address], [address] [facility], [address] [phone]  [address] [facility], [address] [facility] 60, [address] [facility] [address] [place], [address], [phone] the environment safe: How can I make my environment (house/apartment/living space) safer? For example, can I remove guns, medications, and other items? 1. Throw away all medications not being taken  2.  Plan daily goals to help remember to stay on specific medications

## 2020-12-10 NOTE — BH NOTE
Writer called Dr. Kevin Davies office regarding a OB consult and provided  with patients current location at [address] [facility], MRN# and referring practioner. Patient also, updated regarding new order.

## 2020-12-10 NOTE — TELEPHONE ENCOUNTER
Carrie from [facility] called wanted to let DR. Smith know that pt was referred there from Beatriz Green NP and Connor Birmingham was told she needed to make you aware that she is there because she is pregnant and that is the reason why she was referred there because she became upset when she found out however per [facility] ([facility]) she is ok.      THanks

## 2020-12-10 NOTE — GROUP NOTE
Group Therapy Note    Date: 12/10/2020    Group Start Time: 0900  Group End Time: 0920  Group Topic: Community Meeting    EULALIO Doll    Patient refused to attend community meeting/ goals group at 0900 after encouragement from staff. 1:1 talk time provided by staff.         Signature:  Rex Doll

## 2020-12-10 NOTE — GROUP NOTE
Group Therapy Note    Date: 12/10/2020    Group Start Time: 1400  Group End Time: 1500  Group Topic: Recreational    STCZ REJII D    Lexy Quintana    Patient refused to attend coping skills/ creative expression group at 1400 after encouragement from staff. 1:1 talk time provided by staff.         Signature:  Lexy Quintana

## 2020-12-11 VITALS
HEART RATE: 86 BPM | OXYGEN SATURATION: 99 % | TEMPERATURE: 97.8 F | DIASTOLIC BLOOD PRESSURE: 57 MMHG | HEIGHT: 63 IN | BODY MASS INDEX: 31.01 KG/M2 | WEIGHT: 175 LBS | RESPIRATION RATE: 14 BRPM | SYSTOLIC BLOOD PRESSURE: 99 MMHG

## 2020-12-11 PROCEDURE — 6370000000 HC RX 637 (ALT 250 FOR IP): Performed by: PSYCHIATRY & NEUROLOGY

## 2020-12-11 PROCEDURE — 99239 HOSP IP/OBS DSCHRG MGMT >30: CPT | Performed by: PSYCHIATRY & NEUROLOGY

## 2020-12-11 RX ORDER — SERTRALINE HYDROCHLORIDE 25 MG/1
25 TABLET, FILM COATED ORAL ONCE
Status: COMPLETED | OUTPATIENT
Start: 2020-12-11 | End: 2020-12-11

## 2020-12-11 RX ADMIN — SERTRALINE HYDROCHLORIDE 25 MG: 25 TABLET ORAL at 13:30

## 2020-12-11 RX ADMIN — SERTRALINE HYDROCHLORIDE 25 MG: 25 TABLET ORAL at 08:34

## 2020-12-11 NOTE — DISCHARGE SUMMARY
DISCHARGE SUMMARY      Patient ID:  Gera Burrows  [id number]  82 y.o.  2000    Admit date: 12/9/2020    Discharge date and time: 12/11/2020    Disposition: Home     Admitting Physician: Heather Martins MD     Discharge Physician: Dr Lady Ana HARRIS    Admission Diagnoses: Depression with suicidal ideation [F32.9, R45.851]    Admission Condition: poor    Discharged Condition: stable    Admission Circumstance: The patient was admitted to psychiatry after presenting to ER with suicidal ideation. The patient has been ruminating about her boyfriend's completed suicide 1 year before. On her routine testing in the ER the patient found out she was pregnant. This made her feel worse.       PAST MEDICAL/PSYCHIATRIC HISTORY:   Past Medical History:   Diagnosis Date    Anxiety     Depression     HSV-2 infection     LGSIL of cervix of undetermined significance 05/15/2020    Pelvic inflammatory disease     Psychiatric problem        FAMILY/SOCIAL HISTORY:  Family History   Problem Relation Age of Onset    No Known Problems Father     No Known Problems Mother      Social History     Socioeconomic History    Marital status: Single     Spouse name: Not on file    Number of children: 2    Years of education: Not on file    Highest education level: Not on file   Occupational History    Not on file   Social Needs    Financial resource strain: Not on file    Food insecurity     Worry: Not on file     Inability: Not on file   [employer] needs     Medical: Not on file     Non-medical: Not on file   Tobacco Use    Smoking status: Never Smoker    Smokeless tobacco: Never Used   Substance and Sexual Activity    Alcohol use: No     Comment: socially    Drug use: No    Sexual activity: Yes     Partners: Male   Lifestyle    Physical activity     Days per week: Not on file     Minutes per session: Not on file    Stress: Not on file   Relationships    Social connections     Talks on phone: Not on file     Gets together: Not on file     Attends Taoism service: Not on file     Active member of club or organization: Not on file     Attends meetings of clubs or organizations: Not on file     Relationship status: Not on file    Intimate partner violence     Fear of current or ex partner: Not on file     Emotionally abused: Not on file     Physically abused: Not on file     Forced sexual activity: Not on file   Other Topics Concern    Not on file   Social History Narrative    Not on file       MEDICATIONS:    Current Facility-Administered Medications:     [START ON 12/12/2020] sertraline (ZOLOFT) tablet 50 mg, 50 mg, Oral, Daily, Moises Schaefer MD    sertraline (ZOLOFT) tablet 25 mg, 25 mg, Oral, Once, Moises Schaefer MD    traZODone (DESYREL) tablet 50 mg, 50 mg, Oral, Nightly PRN, Moises Schaefer MD, 50 mg at 12/10/20 2224    acetaminophen (TYLENOL) tablet 650 mg, 650 mg, Oral, Q4H PRN, Tyler Brown MD    Examination:  BP (!) 99/57   Pulse 86   Temp 97.8 °F (36.6 °C) (Oral)   Resp 14   Ht 5' 3\" (1.6 m)   Wt 175 lb (79.4 kg)   LMP 10/13/2020 (Exact Date)   SpO2 99%   BMI 31.00 kg/m²   Gait - steady    HOSPITAL COURSE[de-identified]  Following admission to the hospital, patient had a complete physical exam and blood work up, which was unremarkable. The patient declined to see OB/GYN for her pregnancy because she was not planning to keep it. Patient was monitored closely with suicide precaution  Patient was started on Zoloft which he tolerated well    The patient had left her 2 children with her sister. She wanted to return to be with her children and cited them as a big protective factor against suicide  Was encouraged to participate in group and other milieu activity  Patient started to feel better with this combination of treatment. Significant progress in the symptoms since admission.     Mood is improved  The patient denies AVH or paranoid thoughts  The patient denies any hopelessness or worthlessness  No active SI/HI  Appetite:  [x] Normal  [] Increased  [] Decreased    Sleep:       [x] Normal  [] Fair       [] Poor            Energy:    [x] Normal  [] Increased  [] Decreased     SI [] Present  [x] Absent  HI  []Present  [x] Absent   Aggression:  [] yes  [] no  Patient is [x] able  [] unable to CONTRACT FOR SAFETY   Medication side effects(SE):  [x] None(Psych. Meds.) [] Other      Mental Status Examination on discharge:    Level of consciousness:  within normal limits   Appearance:  well-appearing  Behavior/Motor:  no abnormalities noted  Attitude toward examiner:  attentive and good eye contact  Speech:  spontaneous, normal rate and normal volume   Mood: anxious  Affect:  mood congruent  Thought processes:  linear, goal directed and coherent   Thought content:  Suicidal Ideation:  denies suicidal ideation  Delusions:  no evidence of delusions  Perceptual Disturbance:  denies any perceptual disturbance  Cognition:  oriented to person, place, and time   Concentration intact  Memory intact  Insight good   Judgement fair   Fund of Knowledge adequate      ASSESSMENT:  Patient symptoms are:  [x] Well controlled  [x] Improving  [] Worsening  [] No change      Diagnosis:  Active Problems:    Depression with suicidal ideation  Resolved Problems:    * No resolved hospital problems. *      LABS:    No results for input(s): WBC, HGB, PLT in the last 72 hours. No results for input(s): NA, K, CL, CO2, BUN, CREATININE, GLUCOSE in the last 72 hours. No results for input(s): BILITOT, ALKPHOS, AST, ALT in the last 72 hours. Lab Results   Component Value Date    BARBSCNU NEGATIVE 06/08/2017    LABBENZ NEGATIVE 06/08/2017    LABMETH NEGATIVE 06/08/2017    PPXUR NOT REPORTED 06/08/2017     No results found for: TSH, FREET4  No results found for: LITHIUM  No results found for: VALPROATE, CBMZ    RISK ASSESSMENT AT DISCHARGE: Low risk for suicide and homicide. Treatment Plan:  Reviewed current Medications with the patient.  Education provided on the complaince with treatment. Risks, benefits, side effects, drug-to-drug interactions and alternatives to treatment were discussed. Encourage patient to attend outpatient follow up appointment and therapy. Patient was advised to call the outpatient provider, visit the nearest ED or call 911 if symptoms are not manageable. Medication List      START taking these medications    sertraline 50 MG tablet  Commonly known as:  ZOLOFT  Take 1 tablet by mouth daily  Start taking on:  December 12, 2020        STOP taking these medications    valACYclovir 500 MG tablet  Commonly known as:  VALTREX           Where to Get Your Medications      These medications were sent to [address], [address] [facility] [address], [address] [place] [address] Road    Phone:  [phone]   · sertraline 50 MG tablet           TIME SPENT - 35 MINUTES TO COMPLETE THE EVALUATION, DISCHARGE SUMMARY, MEDICATION RECONCILIATION AND FOLLOW UP CARE                                         Kaitlin Tucker is a 23 y.o. female being evaluated Earline Dowling MD on 12/11/2020 at 1:05 PM    An electronic signature was used to authenticate this note. **This report has been created using voice recognition software. It may contain minor errors which are inherent in voice recognition technology. **

## 2020-12-11 NOTE — CARE COORDINATION
Name: Junito Klein    : 2000    Discharge Date: 20    Primary Auth/Cert #: [id number]    Destination: Private residence    Discharge Medications:      Medication List      START taking these medications    sertraline 50 MG tablet  Commonly known as:  ZOLOFT  Take 1 tablet by mouth daily  Start taking on:  2020  Notes to patient:  Lowers depression         STOP taking these medications    valACYclovir 500 MG tablet  Commonly known as:  VALTREX           Where to Get Your Medications      These medications were sent to [address], [address] [address]    Phone:  [phone]   · sertraline 50 MG tablet         Follow Up Appointment: [facility]   [facility]  Go on [facility]  If Hunterpoli Cancino doesn't have a ride home Please send by [facility]   [address] [facility]  Phone: [phone]  Fax: [phone]     Go on 12/15/2020  Hunter Barak, you have a new client appointment on Tuesday, Dec. 15 at 9:30am to link you for all of their services. To include psychiatrist, nursing, therapy,  as well as groups and other services.

## 2020-12-11 NOTE — BH NOTE
[facility]  Discharge Note    Pt discharged with followings belongings:   Dentures: None  Vision - Corrective Lenses: None  Hearing Aid: None  Jewelry: None  Body Piercings Removed: N/A  Clothing: Footwear, Jacket / coat, Pants, Shirt, Other (Comment), Socks(see sheet)  Were All Patient Medications Collected?: Not Applicable  Other Valuables: Cell phone, [employer], Other (Comment)(see sheet)   Valuables sent home with patient. Valuables retrieved from safe, Security envelope number:  [id number] and returned to patient. Patient education on aftercare instructions: yes  Information faxed to [facility] by RN Patient verbalize understanding of AVS:  yes.     Status EXAM upon discharge:  Status and Exam  Normal: No  Facial Expression: Flat  Affect: Appropriate  Level of Consciousness: Alert  Mood:Normal: No  Mood: Depressed, Anxious  Motor Activity:Normal: No  Motor Activity: Decreased  Interview Behavior: Cooperative  Preception: [place] to Person, Julmitzyanne Yumi to Time, [place] to Place, [place] to Situation  Attention:Normal: No  Attention: Distractible  Thought Processes: Blocking  Thought Content:Normal: No  Thought Content: Preoccupations  Hallucinations: None  Delusions: No  Memory:Normal: No  Memory: Poor Remote  Insight and Judgment: No  Insight and Judgment: Poor Judgment, Poor Insight  Present Suicidal Ideation: No  Present Homicidal Ideation: No      Metabolic Screening:    No results found for: LABA1C    No results found for: CHOL  No results found for: TRIG  No results found for: HDL  No components found for: LDLCAL  No results found for: LABVLDL     Patient was transported home by friend    Hazel Gautam RN

## 2020-12-11 NOTE — BH NOTE
On call provider notified of best practice advisory suggesting to place patient on suicide precautions. Provider to discontinue the order as patient does not meet criteria for suicide precautions at this time. Continue to observe patient on every 15 minute checks.

## 2020-12-11 NOTE — GROUP NOTE
Group Therapy Note    Date: 12/11/2020    Group Start Time: 1100  Group End Time: 1130  Group Topic: Psychotherapy    [facility]    Choco Guerrier        Group Therapy Note    Attendees: 4/12         Patient's Goal:  PT will demonstrate increased interpersonal interaction and a clear understanding on multiple types of coping skills relating to the here-and-now therapeutic practice. Notes:  :  Patient is making progress, AEB participating in group discussion, actively listening, and supporting other group members. PT participates in group and encourages others to participate        Status After Intervention:  Improved    Participation Level: Active Listener and Interactive    Participation Quality: Appropriate, Attentive and Sharing      Speech:  normal      Thought Process/Content: Logical      Affective Functioning: Flat      Mood: depressed      Level of consciousness:  Alert, Oriented x4 and Attentive      Response to Learning: Able to verbalize current knowledge/experience and Progressing to goal      Endings: None Reported    Modes of Intervention: Support, Socialization, Exploration, Clarifying and Problem-solving      Discipline Responsible: /Counselor      Signature:   Choco Guerrier

## 2020-12-11 NOTE — PLAN OF CARE
Problem: Altered Mood, Depressive Behavior:  Goal: Ability to disclose and discuss suicidal ideas will improve  Description: Ability to disclose and discuss suicidal ideas will improve  12/10/2020 2236 by Dewey Jones RN  Outcome: Ongoing  Note: Patient is out and social in day room with select peers. Patient reports fleeting suicidal ideation , contacts for safety while on unit. She reports continued depression and anxiety. Patient is on phone at times talking with supports. Patient asked about sleep medication, provider notified. Patient was given trazodone for sleep. Staff maintains Q 15 minute safety checks.

## 2020-12-11 NOTE — GROUP NOTE
Group Therapy Note    Date: 12/11/2020    Group Start Time: 0900  Group End Time: 0920  Group Topic: Community Meeting    EULALIO VINCENT    Huma Marte        Group Therapy Note    Attendees: 5/23         Patient's Goal:  To increase interpersonal interaction    Notes:      Status After Intervention:  Improved    Participation Level:  Active Listener and Interactive    Participation Quality: Appropriate, Attentive and Sharing      Speech:  normal      Thought Process/Content: Logical  Linear      Affective Functioning: Constricted/Restricted      Mood: depressed      Level of consciousness:  Alert, Oriented x4 and Attentive      Response to Learning: Able to verbalize current knowledge/experience, Able to verbalize/acknowledge new learning, Able to retain information and Progressing to goal      Endings: None Reported    Modes of Intervention: Education, Support, Socialization, Exploration, Clarifying and Problem-solving      Discipline Responsible: Psychoeducational Specialist      Signature:  Huma Marte

## 2020-12-11 NOTE — PROGRESS NOTES
Daily Progress Note  Pinky Loomis CNP  12/11/2020      CHIEF COMPLAINT: Suicidal ideation    Reviewed patient's current plan of care and vital signs with nursing staff. Sleep:  several hours last night  Attending groups: Yes    SUBJECTIVE:    Staff reports no overnight events, patient remains medication compliant. She was interviewed today in the common area. She denies any side effects to her Zoloft. She reports that she is tolerating the medication well, denies any nausea or vomiting. She reports that her mood has improved, she states that she misses her kids and is hoping to go home. She reports that she would like to continue receiving psychiatric care on an outpatient basis, she is specifically requesting [facility] as it is near her home. She denies any paranoia or auditory/visual hallucinations. She is denying any suicidal ideation and verbally continues to contract for safety. She is somewhat superficial throughout the interview, she continually circles back to wanting discharge today. She is aware that she will see and speak with the attending physician and that we will need to make sure we arrange safe and appropriate outpatient care for her. She seemed accepting of this response.     Mental Status Exam  Level of consciousness:  Awake and alert  Appearance: Personal attire, seated in chair, with good grooming and hygiene   Behavior/Motor: No abnormalities noted  Attitude toward examiner:  Cooperative, attentive, somewhat superficial, good eye contact  Speech:  spontaneous, normal rate, normal volume and well articulated  Mood: \"I am feeling better\"  Affect: Mood congruent, guarded  Thought processes:  linear, goal directed and coherent  Thought content:  denies homicidal ideation  Suicidal Ideation: Endorses improved suicidal ideation, verbally contracts for safety  Delusions:  no evidence of delusions  Perceptual Disturbance:  denies any perceptual disturbance  Cognition:  Oriented to person, place, time/date, situation   Memory: age appropriate  Insight & Judgement: improving  Medication side effects:  denies       Data   height is 5' 3\" (1.6 m) and weight is 175 lb (79.4 kg). Her oral temperature is 97.8 °F (36.6 °C). Her blood pressure is 99/57 (abnormal) and her pulse is 86. Her respiration is 14 and oxygen saturation is 99%. Labs:   Admission on 12/09/2020   Component Date Value Ref Range Status    SARS-CoV-2 12/09/2020        Final    SARS-CoV-2, Rapid 12/09/2020 Not Detected  Not Detected Final    Comment:       Rapid NAAT:  The specimen is NEGATIVE for SARS-CoV-2, the novel coronavirus associated with   COVID-19. The ID NOW COVID-19 assay is designed to detect the virus that causes COVID-19 in patients   with signs and symptoms of infection who are suspected of COVID-19. An individual without symptoms of COVID-19 and who is not shedding SARS-CoV-2 virus would   expect to have a negative (not detected) result in this assay. Negative results should be treated as presumptive and, if inconsistent with clinical signs   and symptoms or necessary for patient management,  should be tested with an alternative molecular assay. Negative results do not preclude   SARS-CoV-2 infection and   should not be used as the sole basis for patient management decisions. Fact sheet for Healthcare Providers: Jarrod  Fact sheet for Patients: Colleen.[email or web address]          Methodology: Isothermal Nucleic Acid Amplification      Source 12/09/2020 . NASOPHARYNGEAL SWAB   Final    SARS-CoV-2 12/09/2020        Final    HCG, Pregnancy Urine (POC) 12/09/2020 POSITIVE* NEGATIVE Final    Comment:    HCG screen is sensitive to 25 mIU/mL. However this test may  lower levels  of HCG. If further evaluation is needed  please request quantitative HCG.       - 12/09/2020 NOT REPORTED   Final            Medications  Current Facility-Administered

## 2021-04-01 ENCOUNTER — TELEPHONE (OUTPATIENT)
Dept: OBGYN CLINIC | Age: 21
End: 2021-04-01

## 2021-06-24 NOTE — PROGRESS NOTES
Left detailed message for patient with information from below. Patient is to call with any questions or concerns.   
Patient canceled appointment for follow up scheduled for 5/3.  Per Saundra Hughes PA-C call patient, if healed no follow up is needed.    Left message on cell phone to return call regarding wound healing; office number provided.    
to pharmacy  She was also counseled on her preventative health maintenance recommendations and follow-up.   RTC PRN

## 2021-07-26 ENCOUNTER — OFFICE VISIT (OUTPATIENT)
Dept: OBGYN CLINIC | Age: 21
End: 2021-07-26
Payer: MEDICAID

## 2021-07-26 ENCOUNTER — HOSPITAL ENCOUNTER (OUTPATIENT)
Age: 21
Setting detail: SPECIMEN
Discharge: HOME | End: 2021-07-26
Payer: MEDICAID

## 2021-07-26 VITALS
BODY MASS INDEX: 32.6 KG/M2 | WEIGHT: 184 LBS | SYSTOLIC BLOOD PRESSURE: 124 MMHG | HEIGHT: 63 IN | DIASTOLIC BLOOD PRESSURE: 62 MMHG

## 2021-07-26 DIAGNOSIS — N89.8 VAGINAL DISCHARGE: ICD-10-CM

## 2021-07-26 DIAGNOSIS — R10.2 PELVIC PAIN: ICD-10-CM

## 2021-07-26 DIAGNOSIS — Z01.419 ENCOUNTER FOR GYNECOLOGICAL EXAMINATION: Primary | ICD-10-CM

## 2021-07-26 PROCEDURE — 99395 PREV VISIT EST AGE 18-39: CPT | Performed by: PHYSICIAN ASSISTANT

## 2021-07-26 NOTE — PATIENT INSTRUCTIONS
Will notify of pap smear and vaginitis results    Obtain pelvic ultrasound    Return 1 year for annual

## 2021-07-26 NOTE — PROGRESS NOTES
[facility]  [address] [place],  [address]    Luane Mcburney  7/26/2021                       Primary Care Physician: No primary care provider on file. CC:   Chief Complaint   Patient presents with    Annual Exam     Prev Pap: 5/14/20 LSIL    Vaginal Discharge     x1 week discharge & odor         HPI: Luane Mcburney is a 23 y.o. female [id number] Patient's last menstrual period was 06/07/2021. The patient was seen and examined. She is here for an annual visit. She is complaining of lower pelvic pain that is intermittent and present for at least 6 months. She states she occasional has to \"stop in her tracks\" because of the discomfort. She is unsure if this pain is cyclical.     She denies irregular/heavy bleeding and dysmenorrhea. Her periods are regular and last 4 days. She describes them as moderate. She has recently noticed a vaginal discharge and odor x 1 week. Her bowel habits are regular. She denies any bloating. She denies dysuria. She denies urinary leaking. She is not sexually active with not sexually active. She uses no method for contraception and is not desiring pregnancy.     Depression Screen: Symptoms of decreased mood absent  **If either question is answered in a  positive fashion then complete the PHQ9 Scoring Evaluation and make the appropriate referral**    REVIEW OF SYSTEMS:   Constitutional: negative fever, negative chills  HEENT: negative visual disturbances, negative headaches  Respiratory: negative dyspnea, negative cough  Cardiovascular: negative chest pain,  negative palpitations  Gastrointestinal: positive lower abdominal pain, negative RUQ pain, negative N/V, negative diarrhea, negative constipation  Genitourinary: negative dysuria, positive vaginal discharge  Dermatological: negative rash  Hematologic: negative bruising  Immunologic/Lymphatic: negative recent illness, negative recent sick contact  Musculoskeletal: negative back pain, negative myalgias, Colonoscopy: N/A  Date of Last Bone Density: N/A    VITALS:  Vitals:    07/26/21 1337   BP: 124/62   Position: Sitting   Cuff Size: Large Adult   Weight: 184 lb (83.5 kg)   Height: 5' 3\" (1.6 m)                                                                                                                                                               PHYSICAL EXAM:   General Appearance: Appears healthy. Alert; in no acute distress. Pleasant. Skin: Skin color, texture, turgor normal. No rashes or lesions. HEENT: normocephalic and atraumatic, Thyroid normal to inspection and palpation  Respiratory: Normal expansion. Clear to auscultation. No rales, rhonchi, or wheezing. Cardiovascular: normal rate, normal S1 and S2 and no gallops  Breast:  (Chest): normal appearance, no masses or tenderness  Abdomen: soft, non-tender, non-distended, no right upper quadrant tenderness and no CVA tenderness, no abdominal scars  Pelvic Exam:   External genitalia: General appearance; normal, Hair distribution; normal, Lesions absent  Urinary system: urethral meatus normal  Vaginal: normal mucosa, curd-like discharge  Cervix: normal appearing cervix without discharge or lesions, multiparous os, Nabothian cyst at 12 o'clock  Adnexa: normal adnexa in size, nontender and no masses  Uterus: normal single, nontender and mid-position  Rectal Exam: exam declined by patient  Musculoskeletal: no gross abnormalities  Extremities: non-tender BLE and non-edematous  Psych:  oriented to time, place and person     DATA:  No results found for this visit on 07/26/21.     ASSESSMENT & PLAN:    María Landrum is a 23 y.o. female [id number] Patient's last menstrual period was 06/07/2021.   - Pap and Affirm collected, will treat appropriately  - Obtain pelvic ultrasound to evaluate for GYN pathology due to persistent pelvic pain   - She previously received the Garidisil immunization    Patient Active Problem List    Diagnosis Date Noted    Depression with suicidal ideation 2020    High risk pregnancy due to smoking in first trimester 2019    PID (acute pelvic inflammatory disease) 2019     15 F Ap/9 Wt: 6#9 2017    Short femur of fetus  2017    Rh+/RI/GBSpos 2017    Multigravida in third trimester 2017     Does not want an epidural, states she will let know if she decides to, interested in nitrous, breastfeeding, okay with residents and med students.  Obesity  2017       Counseling Completed:    discussed need for repeat pap as per American Society for Colposcopy and Cervical Pathology guidelines. discussed birth control and barrier recommendations. discussed STD counseling and prevention. discussed Gardisil counseling for all patients 9-25 yo. Hereditary Breast, Ovarian, Colon and Uterine Cancer screening discussed       Tobacco & Secondary smoke risks discussed; with recommendation for cessation and avoidance. Routine health maintenance per patients PCP discussed. Patient was seen with total face to face time of 30 minutes. More than 50% of this visit was on counseling and education regarding the problems listed below and her options. She was also counseled on her preventative health maintenance recommendations and follow-up. Diagnosis Orders   1. Encounter for gynecological examination  PAP SMEAR   2. Pelvic pain  US PELVIS COMPLETE NON-OB TRANSABDOMINAL AND TRANSVAGINAL   3.  Vaginal discharge  Vaginitis DNA Probe        [facility] [address]   2021, 2:49 PM

## 2021-07-27 DIAGNOSIS — N89.8 VAGINAL DISCHARGE: ICD-10-CM

## 2021-07-27 DIAGNOSIS — N89.8 VAGINAL DISCHARGE: Primary | ICD-10-CM

## 2021-07-27 LAB
DIRECT EXAM: ABNORMAL
Lab: ABNORMAL
SPECIMEN DESCRIPTION: ABNORMAL

## 2021-07-27 RX ORDER — METRONIDAZOLE 500 MG/1
500 TABLET ORAL 2 TIMES DAILY
Qty: 14 TABLET | Refills: 0 | Status: SHIPPED | OUTPATIENT
Start: 2021-07-27 | End: 2021-08-03

## 2021-07-29 LAB — CYTOLOGY REPORT: NORMAL
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
## 2022-01-12 PROBLEM — M79.602 PAIN IN LEFT ARM: Status: ACTIVE | Noted: 2022-01-12

## 2022-01-13 ENCOUNTER — HOSPITAL ENCOUNTER (OUTPATIENT)
Age: 22
Setting detail: SPECIMEN
Discharge: HOME | End: 2022-01-13

## 2022-01-13 ENCOUNTER — APPOINTMENT (OUTPATIENT)
Dept: CT IMAGING | Age: 22
DRG: 057 | End: 2022-01-13
Payer: MEDICAID

## 2022-01-13 ENCOUNTER — OFFICE VISIT (OUTPATIENT)
Dept: OBGYN CLINIC | Age: 22
End: 2022-01-13
Payer: MEDICAID

## 2022-01-13 ENCOUNTER — HOSPITAL ENCOUNTER (INPATIENT)
Age: 22
LOS: 4 days | Discharge: NURSING, REHAB OR LONG-TERM CARE | DRG: 057 | End: 2022-01-18
Attending: EMERGENCY MEDICINE | Admitting: SURGERY
Payer: MEDICAID

## 2022-01-13 VITALS
HEIGHT: 63 IN | SYSTOLIC BLOOD PRESSURE: 108 MMHG | WEIGHT: 206.6 LBS | BODY MASS INDEX: 36.61 KG/M2 | DIASTOLIC BLOOD PRESSURE: 64 MMHG

## 2022-01-13 DIAGNOSIS — N89.8 VAGINAL ODOR: ICD-10-CM

## 2022-01-13 DIAGNOSIS — S12.100A TRAUMATIC CLOSED FRACTURE OF C2 VERTEBRA WITH MINIMAL DISPLACEMENT, INITIAL ENCOUNTER (HCC): ICD-10-CM

## 2022-01-13 DIAGNOSIS — S12.101A CLOSED NONDISPLACED FRACTURE OF SECOND CERVICAL VERTEBRA, UNSPECIFIED FRACTURE MORPHOLOGY, INITIAL ENCOUNTER (HCC): ICD-10-CM

## 2022-01-13 DIAGNOSIS — F10.929 ALCOHOLIC INTOXICATION WITH COMPLICATION (HCC): ICD-10-CM
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
## 2022-01-13 DIAGNOSIS — N89.8 VAGINAL DISCHARGE: ICD-10-CM

## 2022-01-13 DIAGNOSIS — N89.8 VAGINAL DISCHARGE: Primary | ICD-10-CM

## 2022-01-13 PROBLEM — Z34.83 MULTIGRAVIDA IN THIRD TRIMESTER: Status: RESOLVED | Noted: 2017-05-18 | Resolved: 2022-01-13

## 2022-01-13 LAB
CANDIDA SPECIES, DNA PROBE: NEGATIVE
GARDNERELLA VAGINALIS, DNA PROBE: POSITIVE
SOURCE: ABNORMAL
TRICHOMONAS VAGINALIS DNA: POSITIVE

## 2022-01-13 PROCEDURE — 82947 ASSAY GLUCOSE BLOOD QUANT: CPT

## 2022-01-13 PROCEDURE — 82565 ASSAY OF CREATININE: CPT

## 2022-01-13 PROCEDURE — 85730 THROMBOPLASTIN TIME PARTIAL: CPT

## 2022-01-13 PROCEDURE — 96374 THER/PROPH/DIAG INJ IV PUSH: CPT

## 2022-01-13 PROCEDURE — 96375 TX/PRO/DX INJ NEW DRUG ADDON: CPT

## 2022-01-13 PROCEDURE — 84520 ASSAY OF UREA NITROGEN: CPT

## 2022-01-13 PROCEDURE — G8417 CALC BMI ABV UP PARAM F/U: HCPCS | Performed by: NURSE PRACTITIONER

## 2022-01-13 PROCEDURE — 99283 EMERGENCY DEPT VISIT LOW MDM: CPT

## 2022-01-13 PROCEDURE — 85610 PROTHROMBIN TIME: CPT

## 2022-01-13 PROCEDURE — 6370000000 HC RX 637 (ALT 250 FOR IP): Performed by: STUDENT IN AN ORGANIZED HEALTH CARE EDUCATION/TRAINING PROGRAM

## 2022-01-13 PROCEDURE — G8484 FLU IMMUNIZE NO ADMIN: HCPCS | Performed by: NURSE PRACTITIONER

## 2022-01-13 PROCEDURE — 93005 ELECTROCARDIOGRAM TRACING: CPT | Performed by: STUDENT IN AN ORGANIZED HEALTH CARE EDUCATION/TRAINING PROGRAM

## 2022-01-13 PROCEDURE — 85027 COMPLETE CBC AUTOMATED: CPT

## 2022-01-13 PROCEDURE — 99213 OFFICE O/P EST LOW 20 MIN: CPT | Performed by: NURSE PRACTITIONER

## 2022-01-13 PROCEDURE — 80051 ELECTROLYTE PANEL: CPT

## 2022-01-13 PROCEDURE — 1036F TOBACCO NON-USER: CPT | Performed by: NURSE PRACTITIONER

## 2022-01-13 PROCEDURE — 82805 BLOOD GASES W/O2 SATURATION: CPT

## 2022-01-13 PROCEDURE — 84703 CHORIONIC GONADOTROPIN ASSAY: CPT

## 2022-01-13 PROCEDURE — G0480 DRUG TEST DEF 1-7 CLASSES: HCPCS

## 2022-01-13 PROCEDURE — G8427 DOCREV CUR MEDS BY ELIG CLIN: HCPCS | Performed by: NURSE PRACTITIONER

## 2022-01-13 RX ORDER — ACETAMINOPHEN 500 MG
1000 TABLET ORAL ONCE
Status: COMPLETED | OUTPATIENT
Start: 2022-01-13 | End: 2022-01-13

## 2022-01-13 RX ADMIN — ACETAMINOPHEN 1000 MG: 500 TABLET ORAL at 23:40

## 2022-01-13 ASSESSMENT — PAIN DESCRIPTION - PAIN TYPE: TYPE: ACUTE PAIN

## 2022-01-13 ASSESSMENT — PAIN DESCRIPTION - LOCATION: LOCATION: NECK

## 2022-01-13 ASSESSMENT — PAIN SCALES - GENERAL
PAINLEVEL_OUTOF10: 10
PAINLEVEL_OUTOF10: 10

## 2022-01-13 NOTE — LETTER
STV[facility] 4B Stepdown  [address] [facility] [address]  Phone: [phone]          January 14, 2022     Patient: Crystal Traore   YOB: 2000   Date of Visit: 1/13/2022       To Whom It May Concern: It is my medical opinion that Junlatonia Netgiulia should remain out of work until medically cleared to return to duty at a follow up appointment. She may require an extended time off. If additional documenation is needed, please contact our office. [address] [place] [address]., [address] [facility], [address] [place] [address]  [phone] (M)  [phone] (f)  [email or web address]    If you have any questions or concerns, please don't hesitate to call.     Sincerely,      Kymberly Bryson MD/Waylon Angulo RN, Trauma Coordinator
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
## 2022-01-13 NOTE — LETTER
STVZ 4B Stepdown  [address] [facility] [address]  Phone: [phone]        January 14, 2022     Patient: India Kapadia   YOB: 2000   Date of Visit: 1/13/2022       To Whom It May Concern:    Please excuse any missed time at work for [employer]. His daughter, Mecca Dunlap was admitted under our care beginning Friday, January 14, 2022. His presence at her bedside has been both appreciated and necessary during this unexpected event. If further documentation is needed, please contact our office. [address] [place] [address]., [address] [facility], [address]  [phone] (J)  [phone] (f)  [email or web address]    If you have any questions or concerns, please don't hesitate to call.     Sincerely,        Lynne Rollins MD/Waylon Ruvalcaba RN, Trauma Coordinator

## 2022-01-14 ENCOUNTER — APPOINTMENT (OUTPATIENT)
Dept: CT IMAGING | Age: 22
DRG: 057 | End: 2022-01-14
Payer: MEDICAID

## 2022-01-14 ENCOUNTER — APPOINTMENT (OUTPATIENT)
Dept: GENERAL RADIOLOGY | Age: 22
DRG: 057 | End: 2022-01-14
Payer: MEDICAID

## 2022-01-14 PROBLEM — S12.100A TRAUMATIC CLOSED FRACTURE OF C2 VERTEBRA WITH MINIMAL DISPLACEMENT, INITIAL ENCOUNTER (HCC): Status: ACTIVE | Noted: 2022-01-14

## 2022-01-14 LAB
ALLEN TEST: ABNORMAL
AMPHETAMINE SCREEN URINE: NEGATIVE
AMYLASE: 29 U/L (ref 28–100)
ANION GAP SERPL CALCULATED.3IONS-SCNC: 12 MMOL/L (ref 9–17)
ANION GAP SERPL CALCULATED.3IONS-SCNC: 15 MMOL/L (ref 9–17)
BARBITURATE SCREEN URINE: NEGATIVE
BENZODIAZEPINE SCREEN, URINE: NEGATIVE
BLOOD BANK SPECIMEN: ABNORMAL
BUN BLDV-MCNC: 7 MG/DL (ref 6–20)
BUN BLDV-MCNC: 7 MG/DL (ref 6–20)
BUN/CREAT BLD: ABNORMAL (ref 9–20)
BUPRENORPHINE URINE: NORMAL
CALCIUM SERPL-MCNC: 9.1 MG/DL (ref 8.6–10.4)
CANNABINOID SCREEN URINE: NEGATIVE
CARBOXYHEMOGLOBIN: 2.1 % (ref 0–5)
CHLORIDE BLD-SCNC: 101 MMOL/L (ref 98–107)
CHLORIDE BLD-SCNC: 104 MMOL/L (ref 98–107)
CO2: 20 MMOL/L (ref 20–31)
CO2: 22 MMOL/L (ref 20–31)
COCAINE METABOLITE, URINE: NEGATIVE
CREAT SERPL-MCNC: 0.58 MG/DL (ref 0.5–0.9)
CREAT SERPL-MCNC: 0.67 MG/DL (ref 0.5–0.9)
ETHANOL PERCENT: 0.16 %
ETHANOL: 162 MG/DL
FIO2: ABNORMAL
GFR AFRICAN AMERICAN: >60 ML/MIN
GFR AFRICAN AMERICAN: >60 ML/MIN
GFR NON-AFRICAN AMERICAN: >60 ML/MIN
GFR NON-AFRICAN AMERICAN: >60 ML/MIN
GFR SERPL CREATININE-BSD FRML MDRD: ABNORMAL ML/MIN/{1.73_M2}
GLUCOSE BLD-MCNC: 91 MG/DL (ref 70–99)
GLUCOSE BLD-MCNC: 94 MG/DL (ref 70–99)
HCG QUALITATIVE: NEGATIVE
HCO3 VENOUS: 24.5 MMOL/L (ref 24–30)
HCT VFR BLD CALC: 48.3 % (ref 36.3–47.1)
HEMOGLOBIN: 16.5 G/DL (ref 11.9–15.1)
INR BLD: 0.9
LIPASE: 12 U/L (ref 13–60)
MCH RBC QN AUTO: 31.5 PG (ref 25.2–33.5)
MCHC RBC AUTO-ENTMCNC: 34.2 G/DL (ref 28.4–34.8)
MCV RBC AUTO: 92.2 FL (ref 82.6–102.9)
MDMA URINE: NORMAL
METHADONE SCREEN, URINE: NEGATIVE
METHAMPHETAMINE, URINE: NORMAL
METHEMOGLOBIN: ABNORMAL % (ref 0–1.5)
MODE: ABNORMAL
NEGATIVE BASE EXCESS, VEN: 2.7 MMOL/L (ref 0–2)
NOTIFICATION TIME: ABNORMAL
NOTIFICATION: ABNORMAL
NRBC AUTOMATED: 0 PER 100 WBC
O2 DEVICE/FLOW/%: ABNORMAL
O2 SAT, VEN: 66.6 % (ref 60–85)
OPIATES, URINE: NEGATIVE
OXYCODONE SCREEN URINE: NEGATIVE
OXYHEMOGLOBIN: ABNORMAL % (ref 95–98)
PARTIAL THROMBOPLASTIN TIME: 22.1 SEC (ref 20.5–30.5)
PATIENT TEMP: 37
PCO2, VEN, TEMP ADJ: ABNORMAL MMHG (ref 39–55)
PCO2, VEN: 52.6 (ref 39–55)
PDW BLD-RTO: 13.2 % (ref 11.8–14.4)
PEEP/CPAP: ABNORMAL
PH VENOUS: 7.29 (ref 7.32–7.42)
PH, VEN, TEMP ADJ: ABNORMAL (ref 7.32–7.42)
PHENCYCLIDINE, URINE: NEGATIVE
PLATELET # BLD: 278 K/UL (ref 138–453)
PMV BLD AUTO: 10.4 FL (ref 8.1–13.5)
PO2, VEN, TEMP ADJ: ABNORMAL MMHG (ref 30–50)
PO2, VEN: 41.5 (ref 30–50)
POSITIVE BASE EXCESS, VEN: ABNORMAL MMOL/L (ref 0–2)
POTASSIUM SERPL-SCNC: 3.9 MMOL/L (ref 3.7–5.3)
POTASSIUM SERPL-SCNC: 4.1 MMOL/L (ref 3.7–5.3)
PROPOXYPHENE, URINE: NORMAL
PROTHROMBIN TIME: 10 SEC (ref 9.1–12.3)
PSV: ABNORMAL
PT. POSITION: ABNORMAL
RBC # BLD: 5.24 M/UL (ref 3.95–5.11)
RESPIRATORY RATE: ABNORMAL
SAMPLE SITE: ABNORMAL
SARS-COV-2, RAPID: NOT DETECTED
SET RATE: ABNORMAL
SODIUM BLD-SCNC: 133 MMOL/L (ref 135–144)
SODIUM BLD-SCNC: 141 MMOL/L (ref 135–144)
SPECIMEN DESCRIPTION: NORMAL
TEST INFORMATION: NORMAL
TEXT FOR RESPIRATORY: ABNORMAL
TOTAL HB: ABNORMAL G/DL (ref 12–16)
TOTAL RATE: ABNORMAL
TRICYCLIC ANTIDEPRESSANTS, UR: NORMAL
VT: ABNORMAL
WBC # BLD: 7.4 K/UL (ref 3.5–11.3)

## 2022-01-14 PROCEDURE — 6360000002 HC RX W HCPCS: Performed by: STUDENT IN AN ORGANIZED HEALTH CARE EDUCATION/TRAINING PROGRAM

## 2022-01-14 PROCEDURE — 72125 CT NECK SPINE W/O DYE: CPT

## 2022-01-14 PROCEDURE — 97162 PT EVAL MOD COMPLEX 30 MIN: CPT

## 2022-01-14 PROCEDURE — 6370000000 HC RX 637 (ALT 250 FOR IP): Performed by: STUDENT IN AN ORGANIZED HEALTH CARE EDUCATION/TRAINING PROGRAM

## 2022-01-14 PROCEDURE — 94761 N-INVAS EAR/PLS OXIMETRY MLT: CPT

## 2022-01-14 PROCEDURE — 71260 CT THORAX DX C+: CPT

## 2022-01-14 PROCEDURE — 97167 OT EVAL HIGH COMPLEX 60 MIN: CPT

## 2022-01-14 PROCEDURE — 2060000000 HC ICU INTERMEDIATE R&B

## 2022-01-14 PROCEDURE — 6370000000 HC RX 637 (ALT 250 FOR IP): Performed by: EMERGENCY MEDICINE

## 2022-01-14 PROCEDURE — 99222 1ST HOSP IP/OBS MODERATE 55: CPT | Performed by: NEUROLOGICAL SURGERY

## 2022-01-14 PROCEDURE — 6360000004 HC RX CONTRAST MEDICATION: Performed by: STUDENT IN AN ORGANIZED HEALTH CARE EDUCATION/TRAINING PROGRAM

## 2022-01-14 PROCEDURE — 97535 SELF CARE MNGMENT TRAINING: CPT

## 2022-01-14 PROCEDURE — 6360000002 HC RX W HCPCS: Performed by: HEALTH CARE PROVIDER

## 2022-01-14 PROCEDURE — 3209999900 CT THORACIC SPINE TRAUMA RECONSTRUCTION

## 2022-01-14 PROCEDURE — 72040 X-RAY EXAM NECK SPINE 2-3 VW: CPT

## 2022-01-14 PROCEDURE — 97530 THERAPEUTIC ACTIVITIES: CPT

## 2022-01-14 PROCEDURE — 70496 CT ANGIOGRAPHY HEAD: CPT

## 2022-01-14 PROCEDURE — 82150 ASSAY OF AMYLASE: CPT

## 2022-01-14 PROCEDURE — 83690 ASSAY OF LIPASE: CPT

## 2022-01-14 PROCEDURE — 80048 BASIC METABOLIC PNL TOTAL CA: CPT

## 2022-01-14 PROCEDURE — 2580000003 HC RX 258: Performed by: STUDENT IN AN ORGANIZED HEALTH CARE EDUCATION/TRAINING PROGRAM

## 2022-01-14 PROCEDURE — 97163 PT EVAL HIGH COMPLEX 45 MIN: CPT

## 2022-01-14 PROCEDURE — 70450 CT HEAD/BRAIN W/O DYE: CPT

## 2022-01-14 PROCEDURE — 80307 DRUG TEST PRSMV CHEM ANLYZR: CPT

## 2022-01-14 PROCEDURE — 36415 COLL VENOUS BLD VENIPUNCTURE: CPT

## 2022-01-14 PROCEDURE — 87635 SARS-COV-2 COVID-19 AMP PRB: CPT

## 2022-01-14 PROCEDURE — 6360000002 HC RX W HCPCS: Performed by: EMERGENCY MEDICINE

## 2022-01-14 PROCEDURE — 3209999900 CT LUMBAR SPINE TRAUMA RECONSTRUCTION

## 2022-01-14 RX ORDER — ONDANSETRON 2 MG/ML
4 INJECTION INTRAMUSCULAR; INTRAVENOUS EVERY 6 HOURS PRN
Status: DISCONTINUED | OUTPATIENT
Start: 2022-01-14 | End: 2022-01-14

## 2022-01-14 RX ORDER — OXYCODONE HYDROCHLORIDE 5 MG/1
5 TABLET ORAL EVERY 6 HOURS PRN
Status: DISCONTINUED | OUTPATIENT
Start: 2022-01-14 | End: 2022-01-18 | Stop reason: HOSPADM

## 2022-01-14 RX ORDER — METRONIDAZOLE 500 MG/1
500 TABLET ORAL 2 TIMES DAILY
Qty: 14 TABLET | Refills: 0 | Status: ON HOLD | OUTPATIENT
Start: 2022-01-14 | End: 2022-01-26 | Stop reason: HOSPADM

## 2022-01-14 RX ORDER — ONDANSETRON 2 MG/ML
4 INJECTION INTRAMUSCULAR; INTRAVENOUS ONCE
Status: COMPLETED | OUTPATIENT
Start: 2022-01-14 | End: 2022-01-14

## 2022-01-14 RX ORDER — SODIUM CHLORIDE 0.9 % (FLUSH) 0.9 %
5-40 SYRINGE (ML) INJECTION PRN
Status: DISCONTINUED | OUTPATIENT
Start: 2022-01-14 | End: 2022-01-18 | Stop reason: HOSPADM

## 2022-01-14 RX ORDER — GABAPENTIN 300 MG/1
300 CAPSULE ORAL EVERY 8 HOURS
Status: DISCONTINUED | OUTPATIENT
Start: 2022-01-14 | End: 2022-01-18 | Stop reason: HOSPADM

## 2022-01-14 RX ORDER — OXYCODONE HYDROCHLORIDE 5 MG/1
5 TABLET ORAL EVERY 8 HOURS PRN
Status: DISCONTINUED | OUTPATIENT
Start: 2022-01-14 | End: 2022-01-14

## 2022-01-14 RX ORDER — ONDANSETRON 4 MG/1
4 TABLET, ORALLY DISINTEGRATING ORAL EVERY 8 HOURS PRN
Status: DISCONTINUED | OUTPATIENT
Start: 2022-01-14 | End: 2022-01-14

## 2022-01-14 RX ORDER — SODIUM CHLORIDE, SODIUM LACTATE, POTASSIUM CHLORIDE, CALCIUM CHLORIDE 600; 310; 30; 20 MG/100ML; MG/100ML; MG/100ML; MG/100ML
INJECTION, SOLUTION INTRAVENOUS CONTINUOUS
Status: DISCONTINUED | OUTPATIENT
Start: 2022-01-14 | End: 2022-01-14

## 2022-01-14 RX ORDER — METRONIDAZOLE 500 MG/1
500 TABLET ORAL EVERY 12 HOURS SCHEDULED
Status: DISCONTINUED | OUTPATIENT
Start: 2022-01-15 | End: 2022-01-14

## 2022-01-14 RX ORDER — METHOCARBAMOL 500 MG/1
750 TABLET, FILM COATED ORAL EVERY 6 HOURS
Status: DISCONTINUED | OUTPATIENT
Start: 2022-01-14 | End: 2022-01-18 | Stop reason: HOSPADM

## 2022-01-14 RX ORDER — FENTANYL CITRATE 50 UG/ML
25 INJECTION, SOLUTION INTRAMUSCULAR; INTRAVENOUS ONCE
Status: COMPLETED | OUTPATIENT
Start: 2022-01-14 | End: 2022-01-14

## 2022-01-14 RX ORDER — SODIUM CHLORIDE 0.9 % (FLUSH) 0.9 %
5-40 SYRINGE (ML) INJECTION EVERY 12 HOURS SCHEDULED
Status: DISCONTINUED | OUTPATIENT
Start: 2022-01-14 | End: 2022-01-14

## 2022-01-14 RX ORDER — METRONIDAZOLE 500 MG/1
500 TABLET ORAL EVERY 12 HOURS SCHEDULED
Status: DISCONTINUED | OUTPATIENT
Start: 2022-01-15 | End: 2022-01-18 | Stop reason: HOSPADM

## 2022-01-14 RX ORDER — FENTANYL CITRATE 50 UG/ML
50 INJECTION, SOLUTION INTRAMUSCULAR; INTRAVENOUS ONCE
Status: COMPLETED | OUTPATIENT
Start: 2022-01-14 | End: 2022-01-14

## 2022-01-14 RX ORDER — POLYETHYLENE GLYCOL 3350 17 G/17G
17 POWDER, FOR SOLUTION ORAL DAILY
Qty: 5 EACH | Refills: 0 | Status: ON HOLD | COMMUNITY
Start: 2022-01-15 | End: 2022-01-26

## 2022-01-14 RX ORDER — FENTANYL CITRATE 50 UG/ML
50 INJECTION, SOLUTION INTRAMUSCULAR; INTRAVENOUS ONCE
Status: DISCONTINUED | OUTPATIENT
Start: 2022-01-14 | End: 2022-01-14

## 2022-01-14 RX ORDER — SENNA PLUS 8.6 MG/1
1 TABLET ORAL DAILY PRN
Status: DISCONTINUED | OUTPATIENT
Start: 2022-01-14 | End: 2022-01-14

## 2022-01-14 RX ORDER — GABAPENTIN 300 MG/1
300 CAPSULE ORAL EVERY 8 HOURS
Qty: 15 CAPSULE | Refills: 0 | Status: ON HOLD | OUTPATIENT
Start: 2022-01-14 | End: 2022-01-26

## 2022-01-14 RX ORDER — POLYETHYLENE GLYCOL 3350 17 G/17G
17 POWDER, FOR SOLUTION ORAL DAILY
Status: DISCONTINUED | OUTPATIENT
Start: 2022-01-14 | End: 2022-01-18 | Stop reason: HOSPADM

## 2022-01-14 RX ORDER — IBUPROFEN 400 MG/1
400 TABLET ORAL EVERY 4 HOURS
Status: DISCONTINUED | OUTPATIENT
Start: 2022-01-15 | End: 2022-01-18 | Stop reason: HOSPADM

## 2022-01-14 RX ORDER — ACETAMINOPHEN 500 MG
1000 TABLET ORAL EVERY 8 HOURS
Status: DISCONTINUED | OUTPATIENT
Start: 2022-01-14 | End: 2022-01-16

## 2022-01-14 RX ORDER — OXYCODONE HYDROCHLORIDE 5 MG/1
5 TABLET ORAL EVERY 6 HOURS PRN
Status: DISCONTINUED | OUTPATIENT
Start: 2022-01-14 | End: 2022-01-14

## 2022-01-14 RX ORDER — SODIUM CHLORIDE 9 MG/ML
25 INJECTION, SOLUTION INTRAVENOUS PRN
Status: DISCONTINUED | OUTPATIENT
Start: 2022-01-14 | End: 2022-01-14

## 2022-01-14 RX ORDER — METHOCARBAMOL 750 MG/1
750 TABLET, FILM COATED ORAL EVERY 6 HOURS
Qty: 20 TABLET | Refills: 0 | Status: ON HOLD | OUTPATIENT
Start: 2022-01-14 | End: 2022-01-26 | Stop reason: HOSPADM

## 2022-01-14 RX ADMIN — OXYCODONE HYDROCHLORIDE 5 MG: 5 TABLET ORAL at 17:19

## 2022-01-14 RX ADMIN — GABAPENTIN 300 MG: 300 CAPSULE ORAL at 04:22

## 2022-01-14 RX ADMIN — OXYCODONE HYDROCHLORIDE 5 MG: 5 TABLET ORAL at 10:39

## 2022-01-14 RX ADMIN — FENTANYL CITRATE 25 MCG: 50 INJECTION, SOLUTION INTRAMUSCULAR; INTRAVENOUS at 03:56

## 2022-01-14 RX ADMIN — ACETAMINOPHEN 1000 MG: 500 TABLET ORAL at 04:22

## 2022-01-14 RX ADMIN — METHOCARBAMOL TABLETS 750 MG: 500 TABLET, COATED ORAL at 21:54

## 2022-01-14 RX ADMIN — IOPAMIDOL 75 ML: 755 INJECTION, SOLUTION INTRAVENOUS at 01:19

## 2022-01-14 RX ADMIN — OXYCODONE HYDROCHLORIDE 5 MG: 5 TABLET ORAL at 04:22

## 2022-01-14 RX ADMIN — METHOCARBAMOL TABLETS 750 MG: 500 TABLET, COATED ORAL at 16:12

## 2022-01-14 RX ADMIN — METHOCARBAMOL TABLETS 750 MG: 500 TABLET, COATED ORAL at 04:22

## 2022-01-14 RX ADMIN — FENTANYL CITRATE 50 MCG: 50 INJECTION, SOLUTION INTRAMUSCULAR; INTRAVENOUS at 02:25

## 2022-01-14 RX ADMIN — ENOXAPARIN SODIUM 30 MG: 100 INJECTION SUBCUTANEOUS at 22:37

## 2022-01-14 RX ADMIN — ONDANSETRON 4 MG: 2 INJECTION, SOLUTION INTRAMUSCULAR; INTRAVENOUS at 03:13

## 2022-01-14 RX ADMIN — IOPAMIDOL 75 ML: 755 INJECTION, SOLUTION INTRAVENOUS at 02:54

## 2022-01-14 RX ADMIN — GABAPENTIN 300 MG: 300 CAPSULE ORAL at 20:25

## 2022-01-14 RX ADMIN — ACETAMINOPHEN 1000 MG: 500 TABLET ORAL at 11:56

## 2022-01-14 RX ADMIN — FENTANYL CITRATE 25 MCG: 50 INJECTION, SOLUTION INTRAMUSCULAR; INTRAVENOUS at 09:07

## 2022-01-14 RX ADMIN — GABAPENTIN 300 MG: 300 CAPSULE ORAL at 11:58

## 2022-01-14 RX ADMIN — ACETAMINOPHEN 1000 MG: 500 TABLET ORAL at 20:25

## 2022-01-14 RX ADMIN — SODIUM CHLORIDE, POTASSIUM CHLORIDE, SODIUM LACTATE AND CALCIUM CHLORIDE: 600; 310; 30; 20 INJECTION, SOLUTION INTRAVENOUS at 04:22

## 2022-01-14 RX ADMIN — METHOCARBAMOL TABLETS 750 MG: 500 TABLET, COATED ORAL at 10:34

## 2022-01-14 ASSESSMENT — PAIN DESCRIPTION - PAIN TYPE
TYPE: ACUTE PAIN

## 2022-01-14 ASSESSMENT — ENCOUNTER SYMPTOMS
SORE THROAT: 0
SHORTNESS OF BREATH: 0
NAUSEA: 0
BACK PAIN: 1
VOICE CHANGE: 0
ABDOMINAL PAIN: 0
TROUBLE SWALLOWING: 0
RHINORRHEA: 0
VOMITING: 0
PHOTOPHOBIA: 0

## 2022-01-14 ASSESSMENT — PAIN DESCRIPTION - LOCATION
LOCATION: NECK
LOCATION: BACK;NECK
LOCATION: NECK
LOCATION: NECK

## 2022-01-14 ASSESSMENT — PAIN SCALES - GENERAL
PAINLEVEL_OUTOF10: 9
PAINLEVEL_OUTOF10: 8
PAINLEVEL_OUTOF10: 9
PAINLEVEL_OUTOF10: 9
PAINLEVEL_OUTOF10: 10
PAINLEVEL_OUTOF10: 9
PAINLEVEL_OUTOF10: 8
PAINLEVEL_OUTOF10: 9
PAINLEVEL_OUTOF10: 7
PAINLEVEL_OUTOF10: 7
PAINLEVEL_OUTOF10: 9
PAINLEVEL_OUTOF10: 8
PAINLEVEL_OUTOF10: 10
PAINLEVEL_OUTOF10: 10

## 2022-01-14 ASSESSMENT — PAIN DESCRIPTION - DESCRIPTORS: DESCRIPTORS: DISCOMFORT

## 2022-01-14 ASSESSMENT — PAIN DESCRIPTION - FREQUENCY: FREQUENCY: CONTINUOUS
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
## 2022-01-14 NOTE — ED NOTES
Bed: 01  Expected date:   Expected time:   Means of arrival:   Comments:  45 Sherrie Nath RN  01/13/22 7268

## 2022-01-14 NOTE — PROGRESS NOTES
Did not see patient, patient was seen by fellow co-resident.     Electronically signed by Hugo De La Fuente MD on 1/13/2022 at 10:28 PM
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
## 2022-01-14 NOTE — PLAN OF CARE
- Cervical films reviewed with Dr Jt Alfred  - No NSG intervention needed at this time  - Stable for dc from NSG standpoint  - Tx in collar  - Follow up outpatient
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
## 2022-01-14 NOTE — PROGRESS NOTES
Thoracic and lumbar spine clear. Maintain cervical spine precautions in C collar. OK to work with therapy.     Electronically signed by Mary Jones MD on 1/14/2022 at 12:31 PM
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
## 2022-01-15 PROCEDURE — 6370000000 HC RX 637 (ALT 250 FOR IP): Performed by: STUDENT IN AN ORGANIZED HEALTH CARE EDUCATION/TRAINING PROGRAM

## 2022-01-15 PROCEDURE — 6370000000 HC RX 637 (ALT 250 FOR IP): Performed by: HEALTH CARE PROVIDER

## 2022-01-15 PROCEDURE — 97116 GAIT TRAINING THERAPY: CPT

## 2022-01-15 PROCEDURE — 94761 N-INVAS EAR/PLS OXIMETRY MLT: CPT

## 2022-01-15 PROCEDURE — 2580000003 HC RX 258: Performed by: EMERGENCY MEDICINE

## 2022-01-15 PROCEDURE — 97110 THERAPEUTIC EXERCISES: CPT

## 2022-01-15 PROCEDURE — 6370000000 HC RX 637 (ALT 250 FOR IP): Performed by: EMERGENCY MEDICINE

## 2022-01-15 PROCEDURE — 6360000002 HC RX W HCPCS: Performed by: EMERGENCY MEDICINE

## 2022-01-15 PROCEDURE — 97530 THERAPEUTIC ACTIVITIES: CPT

## 2022-01-15 PROCEDURE — 2060000000 HC ICU INTERMEDIATE R&B

## 2022-01-15 RX ORDER — OXYCODONE HYDROCHLORIDE 5 MG/1
5 TABLET ORAL ONCE
Status: COMPLETED | OUTPATIENT
Start: 2022-01-15 | End: 2022-01-15

## 2022-01-15 RX ADMIN — ACETAMINOPHEN 1000 MG: 500 TABLET ORAL at 05:21

## 2022-01-15 RX ADMIN — OXYCODONE HYDROCHLORIDE 5 MG: 5 TABLET ORAL at 23:15

## 2022-01-15 RX ADMIN — METHOCARBAMOL TABLETS 750 MG: 500 TABLET, COATED ORAL at 17:06

## 2022-01-15 RX ADMIN — METRONIDAZOLE 500 MG: 500 TABLET, FILM COATED ORAL at 08:56

## 2022-01-15 RX ADMIN — GABAPENTIN 300 MG: 300 CAPSULE ORAL at 05:21

## 2022-01-15 RX ADMIN — IBUPROFEN 400 MG: 400 TABLET, FILM COATED ORAL at 13:17

## 2022-01-15 RX ADMIN — METHOCARBAMOL TABLETS 750 MG: 500 TABLET, COATED ORAL at 09:16

## 2022-01-15 RX ADMIN — ACETAMINOPHEN 1000 MG: 500 TABLET ORAL at 13:16

## 2022-01-15 RX ADMIN — GABAPENTIN 300 MG: 300 CAPSULE ORAL at 13:17

## 2022-01-15 RX ADMIN — ENOXAPARIN SODIUM 30 MG: 100 INJECTION SUBCUTANEOUS at 19:42

## 2022-01-15 RX ADMIN — ACETAMINOPHEN 1000 MG: 500 TABLET ORAL at 19:40

## 2022-01-15 RX ADMIN — OXYCODONE HYDROCHLORIDE 5 MG: 5 TABLET ORAL at 19:42

## 2022-01-15 RX ADMIN — OXYCODONE HYDROCHLORIDE 5 MG: 5 TABLET ORAL at 08:57

## 2022-01-15 RX ADMIN — METRONIDAZOLE 500 MG: 500 TABLET, FILM COATED ORAL at 19:42

## 2022-01-15 RX ADMIN — IBUPROFEN 400 MG: 400 TABLET, FILM COATED ORAL at 00:16

## 2022-01-15 RX ADMIN — IBUPROFEN 400 MG: 400 TABLET, FILM COATED ORAL at 08:57

## 2022-01-15 RX ADMIN — METHOCARBAMOL TABLETS 750 MG: 500 TABLET, COATED ORAL at 05:21

## 2022-01-15 RX ADMIN — ENOXAPARIN SODIUM 30 MG: 100 INJECTION SUBCUTANEOUS at 08:57

## 2022-01-15 RX ADMIN — IBUPROFEN 400 MG: 400 TABLET, FILM COATED ORAL at 17:07

## 2022-01-15 RX ADMIN — IBUPROFEN 400 MG: 400 TABLET, FILM COATED ORAL at 05:21

## 2022-01-15 RX ADMIN — METHOCARBAMOL TABLETS 750 MG: 500 TABLET, COATED ORAL at 21:35

## 2022-01-15 RX ADMIN — GABAPENTIN 300 MG: 300 CAPSULE ORAL at 19:40

## 2022-01-15 RX ADMIN — SODIUM CHLORIDE, PRESERVATIVE FREE 10 ML: 5 INJECTION INTRAVENOUS at 19:42

## 2022-01-15 RX ADMIN — IBUPROFEN 400 MG: 400 TABLET, FILM COATED ORAL at 21:35

## 2022-01-15 ASSESSMENT — PAIN SCALES - GENERAL
PAINLEVEL_OUTOF10: 8
PAINLEVEL_OUTOF10: 9
PAINLEVEL_OUTOF10: 8
PAINLEVEL_OUTOF10: 6
PAINLEVEL_OUTOF10: 3
PAINLEVEL_OUTOF10: 8
PAINLEVEL_OUTOF10: 6
PAINLEVEL_OUTOF10: 6
PAINLEVEL_OUTOF10: 8
PAINLEVEL_OUTOF10: 0
PAINLEVEL_OUTOF10: 4
PAINLEVEL_OUTOF10: 8
PAINLEVEL_OUTOF10: 8
PAINLEVEL_OUTOF10: 9

## 2022-01-15 ASSESSMENT — PAIN DESCRIPTION - DIRECTION: RADIATING_TOWARDS: BACK AND RIGHT ARM

## 2022-01-15 ASSESSMENT — PAIN DESCRIPTION - PAIN TYPE: TYPE: ACUTE PAIN

## 2022-01-15 ASSESSMENT — PAIN DESCRIPTION - FREQUENCY: FREQUENCY: CONTINUOUS

## 2022-01-15 ASSESSMENT — PAIN DESCRIPTION - DESCRIPTORS
DESCRIPTORS: DISCOMFORT;CONSTANT;ACHING
DESCRIPTORS: SHOOTING;SHARP

## 2022-01-15 ASSESSMENT — PAIN DESCRIPTION - ORIENTATION
ORIENTATION: UPPER;POSTERIOR
ORIENTATION: POSTERIOR

## 2022-01-15 ASSESSMENT — PAIN DESCRIPTION - LOCATION
LOCATION: BACK;HEAD;NECK
LOCATION: BACK;HEAD;NECK
LOCATION: HEAD;NECK

## 2022-01-15 NOTE — PLAN OF CARE
Problem: Pain:  Goal: Pain level will decrease  Description: Pain level will decrease  1/15/2022 0245 by Barrera Batista RN  Outcome: Ongoing     Problem: Skin Integrity:  Goal: Will show no infection signs and symptoms  Description: Will show no infection signs and symptoms  Outcome: Ongoing     Problem: Falls - Risk of:  Goal: Will remain free from falls  Description: Will remain free from falls  Outcome: Ongoing
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
## 2022-01-15 NOTE — CARE COORDINATION
Case Management Initial Discharge Plan  Florencio Salcedo,             Met with:patient to discuss discharge plans. Information verified: address, contacts, phone number, , insurance Yes  Insurance Provider: [facility]    Emergency Contact/Next of Kin name & number: father Amy Ruby [phone]/sister Jorge [phone]  Who are involved in patient's support system? family    PCP: No primary care provider on file. Date of last visit: n/a      Discharge Planning    Living Arrangements:    Lives alone    Home has 1 stories  3 stairs to climb to get into front door, 0stairs to climb to reach second floor  Location of bedroom/bathroom in home 1st floor    Patient able to perform ADL's:Independent    Current Services (outpatient & in home) n/a  DME equipment: n/a  DME provider: n/a    Is patient receiving oral anticoagulation therapy? No    If indicated:   Physician managing anticoagulation treatment:   Where does patient obtain lab work for ATC treatment? Potential Assistance Needed:       Patient agreeable to home care: no PCP  Freedom of choice provided:  n/a    Prior SNF/Rehab Placement and Facility: no  Agreeable to SNF/Rehab: Yes  [place] of choice provided: yes     Evaluation: yes    Expected Discharge date:       Patient expects to be discharged to:   ARU    If home: is the family and/or caregiver wiling & able to provide support at home? Lives alone  Who will be providing this support? Lives alone    Follow Up Appointment: Best Day/ Time:      Transportation provider: [address]  Transportation arrangements needed for discharge: Yes    Readmission Risk              Risk of Unplanned Readmission:  4             Does patient have a readmission risk score greater than 14?: No  If yes, follow-up appointment must be made within 7 days of discharge.      Goals of Care: ssafety      Educated patient on transitional options, provided freedom of choice and are agreeable with plan      Discharge Plan: Pt lives alone. No PCP. Provided freedom of choice list for ARU provided. [facility] rehab. Referral sent.           Electronically signed by Sebastian Esteban RN on 1/15/22 at 5:45 PM EST
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
## 2022-01-16 PROCEDURE — 6360000002 HC RX W HCPCS: Performed by: EMERGENCY MEDICINE

## 2022-01-16 PROCEDURE — 6370000000 HC RX 637 (ALT 250 FOR IP): Performed by: STUDENT IN AN ORGANIZED HEALTH CARE EDUCATION/TRAINING PROGRAM

## 2022-01-16 PROCEDURE — 6370000000 HC RX 637 (ALT 250 FOR IP): Performed by: HEALTH CARE PROVIDER

## 2022-01-16 PROCEDURE — 94761 N-INVAS EAR/PLS OXIMETRY MLT: CPT

## 2022-01-16 PROCEDURE — 6370000000 HC RX 637 (ALT 250 FOR IP): Performed by: EMERGENCY MEDICINE

## 2022-01-16 PROCEDURE — 97110 THERAPEUTIC EXERCISES: CPT

## 2022-01-16 PROCEDURE — 2580000003 HC RX 258: Performed by: EMERGENCY MEDICINE

## 2022-01-16 PROCEDURE — 2060000000 HC ICU INTERMEDIATE R&B

## 2022-01-16 PROCEDURE — 97116 GAIT TRAINING THERAPY: CPT

## 2022-01-16 PROCEDURE — 97530 THERAPEUTIC ACTIVITIES: CPT

## 2022-01-16 RX ORDER — BUTALBITAL, ACETAMINOPHEN AND CAFFEINE 50; 325; 40 MG/1; MG/1; MG/1
1 TABLET ORAL EVERY 4 HOURS PRN
Status: DISCONTINUED | OUTPATIENT
Start: 2022-01-16 | End: 2022-01-18 | Stop reason: HOSPADM

## 2022-01-16 RX ORDER — ACETAMINOPHEN 500 MG
500 TABLET ORAL EVERY 8 HOURS SCHEDULED
Status: DISCONTINUED | OUTPATIENT
Start: 2022-01-16 | End: 2022-01-18 | Stop reason: HOSPADM

## 2022-01-16 RX ADMIN — METHOCARBAMOL TABLETS 750 MG: 500 TABLET, COATED ORAL at 22:30

## 2022-01-16 RX ADMIN — GABAPENTIN 300 MG: 300 CAPSULE ORAL at 11:15

## 2022-01-16 RX ADMIN — SODIUM CHLORIDE, PRESERVATIVE FREE 10 ML: 5 INJECTION INTRAVENOUS at 20:40

## 2022-01-16 RX ADMIN — ACETAMINOPHEN 1000 MG: 500 TABLET ORAL at 11:15

## 2022-01-16 RX ADMIN — BUTALBITAL, ACETAMINOPHEN AND CAFFEINE 1 TABLET: 50; 325; 40 TABLET ORAL at 16:15

## 2022-01-16 RX ADMIN — ACETAMINOPHEN 500 MG: 500 TABLET ORAL at 22:30

## 2022-01-16 RX ADMIN — IBUPROFEN 400 MG: 400 TABLET, FILM COATED ORAL at 06:14

## 2022-01-16 RX ADMIN — METHOCARBAMOL TABLETS 750 MG: 500 TABLET, COATED ORAL at 04:17

## 2022-01-16 RX ADMIN — IBUPROFEN 400 MG: 400 TABLET, FILM COATED ORAL at 11:15

## 2022-01-16 RX ADMIN — IBUPROFEN 400 MG: 400 TABLET, FILM COATED ORAL at 20:39

## 2022-01-16 RX ADMIN — ACETAMINOPHEN 1000 MG: 500 TABLET ORAL at 04:18

## 2022-01-16 RX ADMIN — GABAPENTIN 300 MG: 300 CAPSULE ORAL at 04:18

## 2022-01-16 RX ADMIN — IBUPROFEN 400 MG: 400 TABLET, FILM COATED ORAL at 02:16

## 2022-01-16 RX ADMIN — ENOXAPARIN SODIUM 30 MG: 100 INJECTION SUBCUTANEOUS at 20:40

## 2022-01-16 RX ADMIN — OXYCODONE HYDROCHLORIDE 5 MG: 5 TABLET ORAL at 20:39

## 2022-01-16 RX ADMIN — METRONIDAZOLE 500 MG: 500 TABLET, FILM COATED ORAL at 09:09

## 2022-01-16 RX ADMIN — METHOCARBAMOL TABLETS 750 MG: 500 TABLET, COATED ORAL at 11:16

## 2022-01-16 RX ADMIN — GABAPENTIN 300 MG: 300 CAPSULE ORAL at 20:39

## 2022-01-16 RX ADMIN — OXYCODONE HYDROCHLORIDE 5 MG: 5 TABLET ORAL at 12:42

## 2022-01-16 RX ADMIN — METRONIDAZOLE 500 MG: 500 TABLET, FILM COATED ORAL at 20:39

## 2022-01-16 RX ADMIN — ENOXAPARIN SODIUM 30 MG: 100 INJECTION SUBCUTANEOUS at 09:09

## 2022-01-16 RX ADMIN — METHOCARBAMOL TABLETS 750 MG: 500 TABLET, COATED ORAL at 17:48

## 2022-01-16 RX ADMIN — IBUPROFEN 400 MG: 400 TABLET, FILM COATED ORAL at 16:15

## 2022-01-16 RX ADMIN — POLYETHYLENE GLYCOL 3350 17 G: 17 POWDER, FOR SOLUTION ORAL at 09:09

## 2022-01-16 RX ADMIN — OXYCODONE HYDROCHLORIDE 5 MG: 5 TABLET ORAL at 06:14

## 2022-01-16 ASSESSMENT — PAIN SCALES - GENERAL
PAINLEVEL_OUTOF10: 8
PAINLEVEL_OUTOF10: 5
PAINLEVEL_OUTOF10: 7
PAINLEVEL_OUTOF10: 6
PAINLEVEL_OUTOF10: 7
PAINLEVEL_OUTOF10: 8
PAINLEVEL_OUTOF10: 7
PAINLEVEL_OUTOF10: 7
PAINLEVEL_OUTOF10: 6
PAINLEVEL_OUTOF10: 7
PAINLEVEL_OUTOF10: 9

## 2022-01-16 ASSESSMENT — PAIN DESCRIPTION - LOCATION: LOCATION: NECK;HEAD;BACK

## 2022-01-16 ASSESSMENT — PAIN DESCRIPTION - DESCRIPTORS: DESCRIPTORS: SHOOTING;SHARP

## 2022-01-16 ASSESSMENT — PAIN DESCRIPTION - ORIENTATION: ORIENTATION: POSTERIOR

## 2022-01-16 ASSESSMENT — PAIN DESCRIPTION - FREQUENCY: FREQUENCY: CONTINUOUS

## 2022-01-16 ASSESSMENT — PAIN DESCRIPTION - PAIN TYPE: TYPE: ACUTE PAIN
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
## 2022-01-17 PROCEDURE — 6370000000 HC RX 637 (ALT 250 FOR IP): Performed by: STUDENT IN AN ORGANIZED HEALTH CARE EDUCATION/TRAINING PROGRAM

## 2022-01-17 PROCEDURE — 99253 IP/OBS CNSLTJ NEW/EST LOW 45: CPT | Performed by: STUDENT IN AN ORGANIZED HEALTH CARE EDUCATION/TRAINING PROGRAM

## 2022-01-17 PROCEDURE — 6370000000 HC RX 637 (ALT 250 FOR IP): Performed by: HEALTH CARE PROVIDER

## 2022-01-17 PROCEDURE — 1200000000 HC SEMI PRIVATE

## 2022-01-17 PROCEDURE — 6360000002 HC RX W HCPCS: Performed by: EMERGENCY MEDICINE

## 2022-01-17 PROCEDURE — 6370000000 HC RX 637 (ALT 250 FOR IP): Performed by: EMERGENCY MEDICINE

## 2022-01-17 PROCEDURE — 2580000003 HC RX 258: Performed by: EMERGENCY MEDICINE

## 2022-01-17 RX ORDER — IBUPROFEN 400 MG/1
400 TABLET ORAL EVERY 4 HOURS
Status: CANCELLED | OUTPATIENT
Start: 2022-01-17 | End: 2022-01-22

## 2022-01-17 RX ORDER — METRONIDAZOLE 500 MG/1
500 TABLET ORAL EVERY 12 HOURS SCHEDULED
Status: CANCELLED | OUTPATIENT
Start: 2022-01-17 | End: 2022-01-22

## 2022-01-17 RX ORDER — OXYCODONE HYDROCHLORIDE 5 MG/1
5 TABLET ORAL EVERY 8 HOURS PRN
Status: CANCELLED | OUTPATIENT
Start: 2022-01-17 | End: 2022-01-20

## 2022-01-17 RX ORDER — POLYETHYLENE GLYCOL 3350 17 G/17G
17 POWDER, FOR SOLUTION ORAL DAILY
Status: CANCELLED | OUTPATIENT
Start: 2022-01-17

## 2022-01-17 RX ORDER — ACETAMINOPHEN 500 MG
500 TABLET ORAL EVERY 8 HOURS SCHEDULED
Status: CANCELLED | OUTPATIENT
Start: 2022-01-17 | End: 2022-01-24

## 2022-01-17 RX ORDER — GABAPENTIN 300 MG/1
300 CAPSULE ORAL EVERY 8 HOURS
Status: CANCELLED | OUTPATIENT
Start: 2022-01-17 | End: 2022-01-24

## 2022-01-17 RX ORDER — METHOCARBAMOL 750 MG/1
750 TABLET, FILM COATED ORAL EVERY 6 HOURS
Status: CANCELLED | OUTPATIENT
Start: 2022-01-17 | End: 2022-01-27

## 2022-01-17 RX ADMIN — METRONIDAZOLE 500 MG: 500 TABLET, FILM COATED ORAL at 21:04

## 2022-01-17 RX ADMIN — IBUPROFEN 400 MG: 400 TABLET, FILM COATED ORAL at 00:03

## 2022-01-17 RX ADMIN — METHOCARBAMOL TABLETS 750 MG: 500 TABLET, COATED ORAL at 09:22

## 2022-01-17 RX ADMIN — ACETAMINOPHEN 500 MG: 500 TABLET ORAL at 14:56

## 2022-01-17 RX ADMIN — METHOCARBAMOL TABLETS 750 MG: 500 TABLET, COATED ORAL at 21:03

## 2022-01-17 RX ADMIN — ENOXAPARIN SODIUM 30 MG: 100 INJECTION SUBCUTANEOUS at 09:23

## 2022-01-17 RX ADMIN — METHOCARBAMOL TABLETS 750 MG: 500 TABLET, COATED ORAL at 16:30

## 2022-01-17 RX ADMIN — IBUPROFEN 400 MG: 400 TABLET, FILM COATED ORAL at 12:12

## 2022-01-17 RX ADMIN — METRONIDAZOLE 500 MG: 500 TABLET, FILM COATED ORAL at 09:22

## 2022-01-17 RX ADMIN — METHOCARBAMOL TABLETS 750 MG: 500 TABLET, COATED ORAL at 04:09

## 2022-01-17 RX ADMIN — ENOXAPARIN SODIUM 30 MG: 100 INJECTION SUBCUTANEOUS at 19:49

## 2022-01-17 RX ADMIN — ACETAMINOPHEN 500 MG: 500 TABLET ORAL at 06:18

## 2022-01-17 RX ADMIN — GABAPENTIN 300 MG: 300 CAPSULE ORAL at 12:12

## 2022-01-17 RX ADMIN — OXYCODONE HYDROCHLORIDE 5 MG: 5 TABLET ORAL at 12:12

## 2022-01-17 RX ADMIN — IBUPROFEN 400 MG: 400 TABLET, FILM COATED ORAL at 04:09

## 2022-01-17 RX ADMIN — POLYETHYLENE GLYCOL 3350 17 G: 17 POWDER, FOR SOLUTION ORAL at 09:23

## 2022-01-17 RX ADMIN — GABAPENTIN 300 MG: 300 CAPSULE ORAL at 04:09

## 2022-01-17 RX ADMIN — ACETAMINOPHEN 500 MG: 500 TABLET ORAL at 21:04

## 2022-01-17 RX ADMIN — GABAPENTIN 300 MG: 300 CAPSULE ORAL at 19:48

## 2022-01-17 RX ADMIN — IBUPROFEN 400 MG: 400 TABLET, FILM COATED ORAL at 19:48

## 2022-01-17 RX ADMIN — SODIUM CHLORIDE, PRESERVATIVE FREE 10 ML: 5 INJECTION INTRAVENOUS at 09:23

## 2022-01-17 RX ADMIN — OXYCODONE HYDROCHLORIDE 5 MG: 5 TABLET ORAL at 19:52

## 2022-01-17 RX ADMIN — IBUPROFEN 400 MG: 400 TABLET, FILM COATED ORAL at 16:30

## 2022-01-17 RX ADMIN — OXYCODONE HYDROCHLORIDE 5 MG: 5 TABLET ORAL at 04:12

## 2022-01-17 RX ADMIN — IBUPROFEN 400 MG: 400 TABLET, FILM COATED ORAL at 09:22

## 2022-01-17 ASSESSMENT — PAIN SCALES - GENERAL
PAINLEVEL_OUTOF10: 8
PAINLEVEL_OUTOF10: 8
PAINLEVEL_OUTOF10: 5
PAINLEVEL_OUTOF10: 6
PAINLEVEL_OUTOF10: 8
PAINLEVEL_OUTOF10: 5
PAINLEVEL_OUTOF10: 6
PAINLEVEL_OUTOF10: 6
PAINLEVEL_OUTOF10: 4
PAINLEVEL_OUTOF10: 5
PAINLEVEL_OUTOF10: 8
PAINLEVEL_OUTOF10: 5
PAINLEVEL_OUTOF10: 4
PAINLEVEL_OUTOF10: 6

## 2022-01-17 ASSESSMENT — ENCOUNTER SYMPTOMS
BACK PAIN: 1
ABDOMINAL PAIN: 0
SHORTNESS OF BREATH: 0
BLURRED VISION: 0
DOUBLE VISION: 0
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
## 2022-01-17 NOTE — CARE COORDINATION
Transitional Planning    Left message with Amira on confidential voicemail, [address], requesting update on acceptance and if she has been accepted she is ready for precert to be started    1422 received message from Rosendo, [address] [facility] [address], pt is accepted. Bed available tomorrow at 1100. Requesting d/c readmit, cont Lov. Orders placed and are correct.  Transport requested          # for report to [phone]
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
## 2022-01-17 NOTE — PROGRESS NOTES
CLINICAL PHARMACY NOTE: MEDS TO BEDS    Total # of Prescriptions Filled: 0   The following medications were delivered to the patient:  · See note. Additional Documentation:  Per CM notes, patient is going to [facility]. Profiled meds - gabapentin and methocarbamol. Metronidazole script from an outside prescriber is ready at [facility] on [address], we could not transfer.  HR

## 2022-01-17 NOTE — PROGRESS NOTES
Notified MARLEY Gonzalez CM, that per Dr Carlos Hernandez pt will be approp for [facility]. Writer will fax notification of admit to [facility] to [facility] with anticipation of admit tomorrow. Writer requested d/c readmit be completed with continuation of DVT prophylaxis and report be called to [phone]. Admission Assessment competed in anticipation of admit to [facility] tomorrow, and Dr Carlos Hernandez notified. Writer faxed notification of anticipated admit to [facility] to [facility].

## 2022-01-17 NOTE — PROGRESS NOTES
Spoke to Neurosurgery. They state patient should wear c-collar when she is showering. Patient will need an extra set of pads for her collar.     Electronically signed by LEELA Rivera CNP on 1/17/2022 at 1:55 PM

## 2022-01-17 NOTE — PLAN OF CARE
Problem: Pain:  Goal: Pain level will decrease  Description: Pain level will decrease  1/17/2022 1605 by Jorge Nicole RN  Outcome: Ongoing  1/17/2022 0335 by Tia Hendricks RN  Outcome: Ongoing  Goal: Control of acute pain  Description: Control of acute pain  Outcome: Ongoing  Goal: Control of chronic pain  Description: Control of chronic pain  Outcome: Ongoing     Problem: Skin Integrity:  Goal: Will show no infection signs and symptoms  Description: Will show no infection signs and symptoms  1/17/2022 1605 by Jorge Nicole RN  Outcome: Ongoing  1/17/2022 0335 by Tia Hendricks RN  Outcome: Ongoing  Goal: Absence of new skin breakdown  Description: Absence of new skin breakdown  Outcome: Ongoing     Problem: Falls - Risk of:  Goal: Will remain free from falls  Description: Will remain free from falls  1/17/2022 1605 by Jorge Nicole RN  Outcome: Ongoing  1/17/2022 0335 by Tia Hendricks RN  Outcome: Ongoing  Goal: Absence of physical injury  Description: Absence of physical injury  Outcome: Ongoing
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
## 2022-01-18 ENCOUNTER — HOSPITAL ENCOUNTER (INPATIENT)
Age: 22
LOS: 8 days | Discharge: HOME | DRG: 862 | End: 2022-01-26
Attending: PHYSICAL MEDICINE & REHABILITATION | Admitting: PHYSICAL MEDICINE & REHABILITATION
Payer: MEDICAID

## 2022-01-18 VITALS
TEMPERATURE: 98.2 F | SYSTOLIC BLOOD PRESSURE: 118 MMHG | DIASTOLIC BLOOD PRESSURE: 71 MMHG | OXYGEN SATURATION: 98 % | HEART RATE: 81 BPM | RESPIRATION RATE: 18 BRPM

## 2022-01-18 DIAGNOSIS — S12.100A TRAUMATIC CLOSED FRACTURE OF C2 VERTEBRA WITH MINIMAL DISPLACEMENT, INITIAL ENCOUNTER (HCC): Primary | ICD-10-CM

## 2022-01-18 DIAGNOSIS — M79.2 NEUROPATHIC PAIN: ICD-10-CM

## 2022-01-18 PROBLEM — S06.9XAA MILD TRAUMATIC BRAIN INJURY: Status: ACTIVE | Noted: 2022-01-18

## 2022-01-18 PROCEDURE — 6360000002 HC RX W HCPCS: Performed by: EMERGENCY MEDICINE

## 2022-01-18 PROCEDURE — 97162 PT EVAL MOD COMPLEX 30 MIN: CPT

## 2022-01-18 PROCEDURE — 99223 1ST HOSP IP/OBS HIGH 75: CPT | Performed by: PHYSICAL MEDICINE & REHABILITATION

## 2022-01-18 PROCEDURE — 6370000000 HC RX 637 (ALT 250 FOR IP): Performed by: STUDENT IN AN ORGANIZED HEALTH CARE EDUCATION/TRAINING PROGRAM

## 2022-01-18 PROCEDURE — 6370000000 HC RX 637 (ALT 250 FOR IP): Performed by: NURSE PRACTITIONER

## 2022-01-18 PROCEDURE — 6370000000 HC RX 637 (ALT 250 FOR IP): Performed by: EMERGENCY MEDICINE

## 2022-01-18 PROCEDURE — 6370000000 HC RX 637 (ALT 250 FOR IP): Performed by: PHYSICAL MEDICINE & REHABILITATION

## 2022-01-18 PROCEDURE — 1180000000 HC REHAB R&B

## 2022-01-18 PROCEDURE — 6370000000 HC RX 637 (ALT 250 FOR IP): Performed by: HEALTH CARE PROVIDER

## 2022-01-18 PROCEDURE — 6360000002 HC RX W HCPCS: Performed by: NURSE PRACTITIONER

## 2022-01-18 PROCEDURE — 97166 OT EVAL MOD COMPLEX 45 MIN: CPT

## 2022-01-18 PROCEDURE — 97116 GAIT TRAINING THERAPY: CPT

## 2022-01-18 PROCEDURE — 97535 SELF CARE MNGMENT TRAINING: CPT

## 2022-01-18 PROCEDURE — 92523 SPEECH SOUND LANG COMPREHEN: CPT

## 2022-01-18 RX ORDER — POLYETHYLENE GLYCOL 3350 17 G/17G
17 POWDER, FOR SOLUTION ORAL DAILY
Status: DISCONTINUED | OUTPATIENT
Start: 2022-01-19 | End: 2022-01-18

## 2022-01-18 RX ORDER — METHOCARBAMOL 750 MG/1
750 TABLET, FILM COATED ORAL EVERY 6 HOURS SCHEDULED
Status: DISCONTINUED | OUTPATIENT
Start: 2022-01-18 | End: 2022-01-18

## 2022-01-18 RX ORDER — METRONIDAZOLE 500 MG/1
500 TABLET ORAL EVERY 12 HOURS SCHEDULED
Status: COMPLETED | OUTPATIENT
Start: 2022-01-18 | End: 2022-01-22

## 2022-01-18 RX ORDER — OXYCODONE HYDROCHLORIDE 5 MG/1
5 TABLET ORAL EVERY 4 HOURS PRN
Status: DISCONTINUED | OUTPATIENT
Start: 2022-01-18 | End: 2022-01-20

## 2022-01-18 RX ORDER — IBUPROFEN 400 MG/1
400 TABLET ORAL
Status: DISCONTINUED | OUTPATIENT
Start: 2022-01-18 | End: 2022-01-18

## 2022-01-18 RX ORDER — METHOCARBAMOL 750 MG/1
750 TABLET, FILM COATED ORAL EVERY 6 HOURS SCHEDULED
Status: DISCONTINUED | OUTPATIENT
Start: 2022-01-18 | End: 2022-01-22

## 2022-01-18 RX ORDER — ACETAMINOPHEN 325 MG/1
650 TABLET ORAL EVERY 4 HOURS PRN
Status: DISCONTINUED | OUTPATIENT
Start: 2022-01-18 | End: 2022-01-22

## 2022-01-18 RX ORDER — OXYCODONE HYDROCHLORIDE 5 MG/1
5 TABLET ORAL EVERY 8 HOURS PRN
Status: DISCONTINUED | OUTPATIENT
Start: 2022-01-18 | End: 2022-01-18

## 2022-01-18 RX ORDER — POLYETHYLENE GLYCOL 3350 17 G/17G
17 POWDER, FOR SOLUTION ORAL DAILY
Status: DISCONTINUED | OUTPATIENT
Start: 2022-01-18 | End: 2022-01-26 | Stop reason: HOSPADM

## 2022-01-18 RX ORDER — GABAPENTIN 300 MG/1
300 CAPSULE ORAL EVERY 8 HOURS SCHEDULED
Status: DISCONTINUED | OUTPATIENT
Start: 2022-01-18 | End: 2022-01-18

## 2022-01-18 RX ORDER — SENNA PLUS 8.6 MG/1
2 TABLET ORAL DAILY PRN
Status: DISCONTINUED | OUTPATIENT
Start: 2022-01-18 | End: 2022-01-26 | Stop reason: HOSPADM

## 2022-01-18 RX ORDER — IBUPROFEN 400 MG/1
400 TABLET ORAL
Status: DISCONTINUED | OUTPATIENT
Start: 2022-01-18 | End: 2022-01-23

## 2022-01-18 RX ORDER — GABAPENTIN 300 MG/1
300 CAPSULE ORAL EVERY 8 HOURS SCHEDULED
Status: DISCONTINUED | OUTPATIENT
Start: 2022-01-18 | End: 2022-01-19

## 2022-01-18 RX ORDER — BISACODYL 10 MG
10 SUPPOSITORY, RECTAL RECTAL DAILY PRN
Status: DISCONTINUED | OUTPATIENT
Start: 2022-01-18 | End: 2022-01-26 | Stop reason: HOSPADM

## 2022-01-18 RX ADMIN — METHOCARBAMOL 750 MG: 750 TABLET, FILM COATED ORAL at 13:42

## 2022-01-18 RX ADMIN — OXYCODONE HYDROCHLORIDE 5 MG: 5 TABLET ORAL at 15:09

## 2022-01-18 RX ADMIN — GABAPENTIN 300 MG: 300 CAPSULE ORAL at 20:15

## 2022-01-18 RX ADMIN — IBUPROFEN 400 MG: 400 TABLET, FILM COATED ORAL at 04:08

## 2022-01-18 RX ADMIN — IBUPROFEN 400 MG: 400 TABLET, FILM COATED ORAL at 08:25

## 2022-01-18 RX ADMIN — IBUPROFEN 400 MG: 400 TABLET, FILM COATED ORAL at 00:04

## 2022-01-18 RX ADMIN — ENOXAPARIN SODIUM 30 MG: 100 INJECTION SUBCUTANEOUS at 20:14

## 2022-01-18 RX ADMIN — GABAPENTIN 300 MG: 300 CAPSULE ORAL at 14:30

## 2022-01-18 RX ADMIN — METRONIDAZOLE 500 MG: 500 TABLET, FILM COATED ORAL at 08:25

## 2022-01-18 RX ADMIN — OXYCODONE HYDROCHLORIDE 5 MG: 5 TABLET ORAL at 09:44

## 2022-01-18 RX ADMIN — OXYCODONE HYDROCHLORIDE 5 MG: 5 TABLET ORAL at 20:16

## 2022-01-18 RX ADMIN — METHOCARBAMOL TABLETS 750 MG: 500 TABLET, COATED ORAL at 04:08

## 2022-01-18 RX ADMIN — POLYETHYLENE GLYCOL 3350 17 G: 17 POWDER, FOR SOLUTION ORAL at 08:29

## 2022-01-18 RX ADMIN — GABAPENTIN 300 MG: 300 CAPSULE ORAL at 04:08

## 2022-01-18 RX ADMIN — IBUPROFEN 400 MG: 400 TABLET, FILM COATED ORAL at 20:15

## 2022-01-18 RX ADMIN — METHOCARBAMOL 750 MG: 750 TABLET, FILM COATED ORAL at 18:14

## 2022-01-18 RX ADMIN — METHOCARBAMOL TABLETS 750 MG: 500 TABLET, COATED ORAL at 09:46

## 2022-01-18 RX ADMIN — IBUPROFEN 400 MG: 400 TABLET, FILM COATED ORAL at 13:42

## 2022-01-18 RX ADMIN — METRONIDAZOLE 500 MG: 500 TABLET ORAL at 21:26

## 2022-01-18 RX ADMIN — ENOXAPARIN SODIUM 30 MG: 100 INJECTION SUBCUTANEOUS at 08:25

## 2022-01-18 RX ADMIN — ACETAMINOPHEN 500 MG: 500 TABLET ORAL at 06:59

## 2022-01-18 ASSESSMENT — PAIN DESCRIPTION - DESCRIPTORS
DESCRIPTORS: ACHING;SHOOTING
DESCRIPTORS: ACHING;SHOOTING

## 2022-01-18 ASSESSMENT — PAIN DESCRIPTION - PAIN TYPE
TYPE: ACUTE PAIN

## 2022-01-18 ASSESSMENT — PAIN SCALES - GENERAL
PAINLEVEL_OUTOF10: 9
PAINLEVEL_OUTOF10: 9
PAINLEVEL_OUTOF10: 3
PAINLEVEL_OUTOF10: 9
PAINLEVEL_OUTOF10: 9
PAINLEVEL_OUTOF10: 8
PAINLEVEL_OUTOF10: 8
PAINLEVEL_OUTOF10: 5
PAINLEVEL_OUTOF10: 8
PAINLEVEL_OUTOF10: 9
PAINLEVEL_OUTOF10: 4
PAINLEVEL_OUTOF10: 9

## 2022-01-18 ASSESSMENT — PAIN DESCRIPTION - LOCATION
LOCATION: HEAD;NECK
LOCATION: HEAD;NECK
LOCATION: HEAD

## 2022-01-18 ASSESSMENT — PAIN - FUNCTIONAL ASSESSMENT: PAIN_FUNCTIONAL_ASSESSMENT: 0-10

## 2022-01-18 ASSESSMENT — PAIN DESCRIPTION - FREQUENCY
FREQUENCY: CONTINUOUS
FREQUENCY: CONTINUOUS
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
## 2022-01-18 NOTE — CARE COORDINATION
Transition planning   Called [facility] to let them know of patients room change from request of transport - spoke to Nafisa Stewart

## 2022-01-19 LAB
ANION GAP SERPL CALCULATED.3IONS-SCNC: 9 MMOL/L (ref 9–17)
BUN BLDV-MCNC: 13 MG/DL (ref 6–20)
BUN/CREAT BLD: ABNORMAL (ref 9–20)
CALCIUM SERPL-MCNC: 8.9 MG/DL (ref 8.6–10.4)
CHLORIDE BLD-SCNC: 102 MMOL/L (ref 98–107)
CO2: 23 MMOL/L (ref 20–31)
CREAT SERPL-MCNC: 0.71 MG/DL (ref 0.5–0.9)
GFR AFRICAN AMERICAN: >60 ML/MIN
GFR NON-AFRICAN AMERICAN: >60 ML/MIN
GFR SERPL CREATININE-BSD FRML MDRD: ABNORMAL ML/MIN/{1.73_M2}
GFR SERPL CREATININE-BSD FRML MDRD: ABNORMAL ML/MIN/{1.73_M2}
GLUCOSE BLD-MCNC: 90 MG/DL (ref 70–99)
HCT VFR BLD CALC: 38.7 % (ref 36–46)
HEMOGLOBIN: 13.1 G/DL (ref 12–16)
MCH RBC QN AUTO: 31 PG (ref 26–34)
MCHC RBC AUTO-ENTMCNC: 33.8 G/DL (ref 31–37)
MCV RBC AUTO: 91.9 FL (ref 80–100)
NRBC AUTOMATED: NORMAL PER 100 WBC
PDW BLD-RTO: 13.5 % (ref 11.5–14.9)
PLATELET # BLD: 196 K/UL (ref 150–450)
PMV BLD AUTO: 8.4 FL (ref 6–12)
POTASSIUM SERPL-SCNC: 4.3 MMOL/L (ref 3.7–5.3)
RBC # BLD: 4.21 M/UL (ref 4–5.2)
SODIUM BLD-SCNC: 134 MMOL/L (ref 135–144)
WBC # BLD: 4.6 K/UL (ref 3.5–11)

## 2022-01-19 PROCEDURE — 36415 COLL VENOUS BLD VENIPUNCTURE: CPT

## 2022-01-19 PROCEDURE — 99232 SBSQ HOSP IP/OBS MODERATE 35: CPT | Performed by: PHYSICAL MEDICINE & REHABILITATION

## 2022-01-19 PROCEDURE — 85027 COMPLETE CBC AUTOMATED: CPT

## 2022-01-19 PROCEDURE — 97530 THERAPEUTIC ACTIVITIES: CPT

## 2022-01-19 PROCEDURE — 80048 BASIC METABOLIC PNL TOTAL CA: CPT

## 2022-01-19 PROCEDURE — 6370000000 HC RX 637 (ALT 250 FOR IP): Performed by: NURSE PRACTITIONER

## 2022-01-19 PROCEDURE — 99253 IP/OBS CNSLTJ NEW/EST LOW 45: CPT | Performed by: INTERNAL MEDICINE

## 2022-01-19 PROCEDURE — 97535 SELF CARE MNGMENT TRAINING: CPT

## 2022-01-19 PROCEDURE — 6360000002 HC RX W HCPCS: Performed by: NURSE PRACTITIONER

## 2022-01-19 PROCEDURE — 6370000000 HC RX 637 (ALT 250 FOR IP): Performed by: PHYSICAL MEDICINE & REHABILITATION

## 2022-01-19 PROCEDURE — 97110 THERAPEUTIC EXERCISES: CPT

## 2022-01-19 PROCEDURE — 97116 GAIT TRAINING THERAPY: CPT

## 2022-01-19 PROCEDURE — 1180000000 HC REHAB R&B

## 2022-01-19 RX ORDER — GABAPENTIN 400 MG/1
400 CAPSULE ORAL EVERY 8 HOURS SCHEDULED
Status: DISCONTINUED | OUTPATIENT
Start: 2022-01-19 | End: 2022-01-20

## 2022-01-19 RX ADMIN — METHOCARBAMOL 750 MG: 750 TABLET, FILM COATED ORAL at 00:18

## 2022-01-19 RX ADMIN — GABAPENTIN 400 MG: 400 CAPSULE ORAL at 20:08

## 2022-01-19 RX ADMIN — METRONIDAZOLE 500 MG: 500 TABLET ORAL at 08:41

## 2022-01-19 RX ADMIN — POLYETHYLENE GLYCOL 3350 17 G: 17 POWDER, FOR SOLUTION ORAL at 08:43

## 2022-01-19 RX ADMIN — IBUPROFEN 400 MG: 400 TABLET, FILM COATED ORAL at 16:35

## 2022-01-19 RX ADMIN — GABAPENTIN 300 MG: 300 CAPSULE ORAL at 05:08

## 2022-01-19 RX ADMIN — OXYCODONE HYDROCHLORIDE 5 MG: 5 TABLET ORAL at 17:28

## 2022-01-19 RX ADMIN — ENOXAPARIN SODIUM 30 MG: 100 INJECTION SUBCUTANEOUS at 20:07

## 2022-01-19 RX ADMIN — IBUPROFEN 400 MG: 400 TABLET, FILM COATED ORAL at 20:08

## 2022-01-19 RX ADMIN — IBUPROFEN 400 MG: 400 TABLET, FILM COATED ORAL at 12:25

## 2022-01-19 RX ADMIN — OXYCODONE HYDROCHLORIDE 5 MG: 5 TABLET ORAL at 05:07

## 2022-01-19 RX ADMIN — OXYCODONE HYDROCHLORIDE 5 MG: 5 TABLET ORAL at 00:18

## 2022-01-19 RX ADMIN — OXYCODONE HYDROCHLORIDE 5 MG: 5 TABLET ORAL at 22:08

## 2022-01-19 RX ADMIN — IBUPROFEN 400 MG: 400 TABLET, FILM COATED ORAL at 05:07

## 2022-01-19 RX ADMIN — ACETAMINOPHEN 650 MG: 325 TABLET, FILM COATED ORAL at 00:18

## 2022-01-19 RX ADMIN — ACETAMINOPHEN 650 MG: 325 TABLET, FILM COATED ORAL at 05:08

## 2022-01-19 RX ADMIN — METRONIDAZOLE 500 MG: 500 TABLET ORAL at 20:08

## 2022-01-19 RX ADMIN — METHOCARBAMOL 750 MG: 750 TABLET, FILM COATED ORAL at 05:08

## 2022-01-19 RX ADMIN — METHOCARBAMOL 750 MG: 750 TABLET, FILM COATED ORAL at 22:07

## 2022-01-19 RX ADMIN — IBUPROFEN 400 MG: 400 TABLET, FILM COATED ORAL at 00:18

## 2022-01-19 RX ADMIN — IBUPROFEN 400 MG: 400 TABLET, FILM COATED ORAL at 22:08

## 2022-01-19 RX ADMIN — ENOXAPARIN SODIUM 30 MG: 100 INJECTION SUBCUTANEOUS at 08:41

## 2022-01-19 RX ADMIN — IBUPROFEN 400 MG: 400 TABLET, FILM COATED ORAL at 08:42

## 2022-01-19 RX ADMIN — GABAPENTIN 400 MG: 400 CAPSULE ORAL at 14:46

## 2022-01-19 RX ADMIN — METHOCARBAMOL 750 MG: 750 TABLET, FILM COATED ORAL at 12:25

## 2022-01-19 RX ADMIN — ACETAMINOPHEN 650 MG: 325 TABLET, FILM COATED ORAL at 22:08

## 2022-01-19 RX ADMIN — METHOCARBAMOL 750 MG: 750 TABLET, FILM COATED ORAL at 17:27

## 2022-01-19 ASSESSMENT — PAIN SCALES - GENERAL
PAINLEVEL_OUTOF10: 9
PAINLEVEL_OUTOF10: 9
PAINLEVEL_OUTOF10: 10
PAINLEVEL_OUTOF10: 9
PAINLEVEL_OUTOF10: 10
PAINLEVEL_OUTOF10: 9
PAINLEVEL_OUTOF10: 10
PAINLEVEL_OUTOF10: 9
PAINLEVEL_OUTOF10: 10
PAINLEVEL_OUTOF10: 9

## 2022-01-19 ASSESSMENT — PAIN DESCRIPTION - DESCRIPTORS: DESCRIPTORS: TENDER

## 2022-01-19 ASSESSMENT — PAIN DESCRIPTION - ORIENTATION: ORIENTATION: POSTERIOR;RIGHT

## 2022-01-19 ASSESSMENT — PAIN DESCRIPTION - PAIN TYPE: TYPE: ACUTE PAIN

## 2022-01-19 ASSESSMENT — PAIN DESCRIPTION - LOCATION: LOCATION: NECK

## 2022-01-19 NOTE — PROGRESS NOTES
Patient admitted from [address]. Admission diagnosis of TBI with cervical spine fractures. Admission assessment completed as documented in flow sheet. Consents signed.

## 2022-01-19 NOTE — PLAN OF CARE
Individualized Plan of Care  [facility]    Rehabilitation physician: Dr Carvajal  Date: 1/18/2022     Rehabilitation Diagnosis: Mild traumatic brain injury, without loss of consciousness, subsequent encounter [S06.9X0D]     Rehabilitation impairments: self care, mobility and pain management    Factors facilitating achievement of predicted outcomes: Family support, Motivated, Cooperative, Pleasant and Good insight into deficits  Barriers to the achievement of predicted outcomes: Pain, Decreased endurance and Medication managment    Patient Goals: Improve independence with mobility, Increase overall strength and endurance, Increase independence with activities of daily living, Integrate appropriate pain management plan, Assure adequate nutritional option for discharge and Provide appropriate patient and family education      NURSING:  Nursing goals for Shelby Jason while on the rehabilitation unit will include:  Adequate number of bowel movements, Complete bladder protocol with ervin removal, Maintain O2 SATs at an acceptable level during stay, Effective pain management while on the rehabilitation unit, Establish adequate pain control plan for discharge, Absence of skin breakdown while on the rehabilitation unit, Avoidance of any hospital acquired infections, Freedom from injury during hospitalization and Complete education with patient/family with understanding demonstrated regarding disease process and resultant impairment     In order to achieve these goals, nursing interventions may include bowel/bladder training, education for medical assistive devices, medication education, O2 saturation management, energy conservation, stress management techniques, fall prevention, alarms protocol, seating and positioning, skin/wound care, pressure relief instruction, dressing changes, infection protection, DVT prophylaxis, assistance with safe transfers , and/or assistance with bathroom activities and hygiene. PHYSICAL THERAPY:  Goals:        Short term goals  Time Frame for Short term goals: 6 days  Short term goal 1: Pt will amulate 250' w/ RW or least restrictive AD SBA  Short term goal 2: Pt will ascend/descend 8 to 10  steps with unilateral/bilateral railing CGA  Short term goal 3: Pt will perform bed mobility MOD I  Short term goal 4: Pt will perform sit to stand and pivot transfers MOD I  Long term goals  Time Frame for Long term goals : By DC  Long term goal 1: Pt able to perform transfers independently from various surfaces   Long term goal 2:  Pt able to ambulate with least restricitive device distance of 200 ft atleast , level as well as incline surfaces, mod-I  Long term goal 3: Improve 2MWT distance to atleast 150 ft, without device, to improve overall function. Long term goal 4: Pt able to go up and down flight of steps with 1 HR, mod-I  Long term goal 5: Pt to demonstrate independence in HEP. Plan of Care: Pt to be seen by physical therapy services 1 Hour 30 Minutes per day at least 5 out of 7 days per week     Anticipated interventions may include therapeutic exercises, gait training, neuromuscular re-ed, transfer training, community reintegration, bed mobility, w/c mobility and training.       OCCUPATIONAL THERAPY:  Goals:             Short term goals  Time Frame for Short term goals: By 1 week  Short term goal 1: Pt will complete upper body bathing/dressing with min A and good safety while maintaining cervial precautions  Short term goal 2: Pt will complete lower body dressing/bathing with SBA and good safety   Short term goal 3: Pt will complete functional transfers/mobility during self care tasks with SBA and good safety  Short term goal 4: Pt will tolerate standing 5+ minutes during functional activity of choice and good safety  Short term goal 5: Pt/family with demonstrate good understanding of cervical precautions during activities of daily living  Short term goal 6: Pt will participate in 30+ minutes of therapeutic exercises/functional activities to increase safety and independence with self care and mobility  Short term goal 7: Pt will demonstrate 3 non-pharmaceutical intervention strategies to reduce pain and increase participation in activities of daily living. Long term goals  Time Frame for Long term goals : By discharge  Long term goal 1: Pt will complete BADLs with modified independence and good safety  Long term goal 2: Pt will complete functional transfer/mobility during self care tasks with modified independence with good safety with use of least restrictive device  Long term goal 3: Pt will tolerate standing 10+ minutes during functional activity of choice with good safety  Long term goal 4: Pt will complete tub transfer with supervision and good safety  Long term goal 5: Pt will complete simple meal pre/light house keeping task with supervision and good safety  Long term goals 6: Pt will complete floor transfer with supervision and good safety while maintaining cervial precations to participate in  activites  Long term goal 7: Pt/family will demonstrate good understanding of cevical precautions and lifting restrictions in regards to  to reduce risk of further injury during  tasks. Plan of Care: Patient to be seen by occupational therapy services 1 Hour 30 Minutes per day at least 5 out of 7 days per week     Anticipated interventions may include ADL and IADL retraining, strengthening, safety education and training, patient/caregiver education and training, equipment evaluation/ training/procurement, neuromuscular reeducation, wheelchair mobility training. SPEECH THERAPY:   Goals:                      Plan of Care:     CASE MANAGEMENT:  Goals:   Assist patient/family with discharge planning, patient/family counseling,  and coordination with insurance during the inpatient rehabilitation stay.          Other members of the multidisciplinary rehabilitation team that will be involved in the patient's plan of care include recreational therapy, dietary, respiratory therapy, and neuropsychology. Medical issues being managed closely and that require 24 hour availability of a physician:  Pain management, Infection protection, DVT prophylaxis, Fall precautions, Fluid/Electrolyte balance and Nutritional status                                           Physician anticipated functional outcomes: Improved independence with functional measures   Estimated length of stay for this admission 2 weeks  Medical Prognosis: Fair  Anticipated disposition: Home. The potential to achieve the above medical and rehabilitative goals is fair. This plan of care has been developed with the assistance and input of the multidisciplinary rehabilitation team.  The plan was reviewed with the patient on 1/19/2022. The patient has had the opportunity to provide input to the therapy team.    I have reviewed this Individualized Plan of Care and agree with its contents. Above documentation has been expanded, modified, adjusted to reflect the findings of my evaluations and goals for the patient.     Physician:  Electronically signed by Mary Gregory MD on 1/19/22 at 9:58 AM EST
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
## 2022-01-19 NOTE — PROGRESS NOTES
returning sitting at EOB. Pt completed toileting tasks (wiping) sitting on BSC with Mod IND with set up from therapist. Pt completed sit to supine with Min A x2 for trunk and BLE progression. Pt retired supine in bed at end of session, all needs met, and call light in reach. Pt would benefit from continued skilled occupational therapy services to increase endurance, balance, and sensation to improve independence in ADLs/IADLs and functional transfers/mobility. Prognosis: Good  Discharge Recommendations: Patient would benefit from continued therapy after discharge;Home with assist PRN  Activity Tolerance: Patient Tolerated treatment well;Patient limited by fatigue  Safety Devices in place: Yes  Type of devices: All fall risk precautions in place;Call light within reach;Gait belt;Patient at risk for falls; Left in bed;Nurse notified  Restraints  Initially in place: No          Plan  Plan  Times per week: 5-7  Times per day: Twice a day  Current Treatment Recommendations: Self-Care / ADL,Strengthening,Balance Training,Functional Mobility Training,Endurance [employer] Management,Safety Education & Training,Patient/Caregiver Education & Training,Equipment Evaluation, Education, & procurement,Home Management Training  Patient Goals   Patient goals : To be independent with activities of daily living.    Short term goals  Time Frame for Short term goals: By 1 week  Short term goal 1: Pt will complete upper body bathing/dressing with min A and good safety while maintaining cervial precautions  Short term goal 2: Pt will complete lower body dressing/bathing with SBA and good safety   Short term goal 3: Pt will complete functional transfers/mobility during self care tasks with SBA and good safety  Short term goal 4: Pt will tolerate standing 5+ minutes during functional activity of choice and good safety  Short term goal 5: Pt/family with demonstrate good understanding of cervical precautions during activities of daily living  Short term goal 6: Pt will participate in 30+ minutes of therapeutic exercises/functional activities to increase safety and independence with self care and mobility  Short term goal 7: Pt will demonstrate 3 non-pharmaceutical intervention strategies to reduce pain and increase participation in activities of daily living. Long term goals  Time Frame for Long term goals : By discharge  Long term goal 1: Pt will complete BADLs with modified independence and good safety  Long term goal 2: Pt will complete functional transfer/mobility during self care tasks with modified independence with good safety with use of least restrictive device  Long term goal 3: Pt will tolerate standing 10+ minutes during functional activity of choice with good safety  Long term goal 4: Pt will complete tub transfer with supervision and good safety  Long term goal 5: Pt will complete simple meal pre/light house keeping task with supervision and good safety  Long term goals 6: Pt will complete floor transfer with supervision and good safety while maintaining cervial precations to participate in  activites  Long term goal 7: Pt/family will demonstrate good understanding of cevical precautions and lifting restrictions in regards to  to reduce risk of further injury during  tasks.          01/19/22 1203 01/19/22 1524   OT Individual Minutes   Time In 1002 1334   Time Out 1105 1404   Minutes 63 30     Electronically signed by THOMAS Ying on 1/19/22 at 3:26 PM EST
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
## 2022-01-19 NOTE — PLAN OF CARE
Nutrition Problem #1: Increased nutrient needs  Intervention: Food and/or Nutrient Delivery: Continue Current Diet  Nutritional Goals: PO intake % of meals with adequate protein intake
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
## 2022-01-20 ENCOUNTER — APPOINTMENT (OUTPATIENT)
Dept: CT IMAGING | Age: 22
DRG: 862 | End: 2022-01-20
Attending: PHYSICAL MEDICINE & REHABILITATION
Payer: MEDICAID

## 2022-01-20 LAB
EKG ATRIAL RATE: 102 BPM
EKG P-R INTERVAL: 134 MS
EKG Q-T INTERVAL: 346 MS
EKG QRS DURATION: 88 MS
EKG QTC CALCULATION (BAZETT): 450 MS
EKG R AXIS: 17 DEGREES
EKG T AXIS: 153 DEGREES
EKG VENTRICULAR RATE: 102 BPM

## 2022-01-20 PROCEDURE — 97110 THERAPEUTIC EXERCISES: CPT

## 2022-01-20 PROCEDURE — 1180000000 HC REHAB R&B

## 2022-01-20 PROCEDURE — 93010 ELECTROCARDIOGRAM REPORT: CPT | Performed by: INTERNAL MEDICINE

## 2022-01-20 PROCEDURE — 6370000000 HC RX 637 (ALT 250 FOR IP): Performed by: PHYSICAL MEDICINE & REHABILITATION

## 2022-01-20 PROCEDURE — 99232 SBSQ HOSP IP/OBS MODERATE 35: CPT | Performed by: PHYSICAL MEDICINE & REHABILITATION

## 2022-01-20 PROCEDURE — 6360000002 HC RX W HCPCS: Performed by: NURSE PRACTITIONER

## 2022-01-20 PROCEDURE — 6370000000 HC RX 637 (ALT 250 FOR IP): Performed by: NURSE PRACTITIONER

## 2022-01-20 PROCEDURE — 99232 SBSQ HOSP IP/OBS MODERATE 35: CPT | Performed by: INTERNAL MEDICINE

## 2022-01-20 PROCEDURE — 97530 THERAPEUTIC ACTIVITIES: CPT

## 2022-01-20 PROCEDURE — 97116 GAIT TRAINING THERAPY: CPT

## 2022-01-20 PROCEDURE — 70450 CT HEAD/BRAIN W/O DYE: CPT

## 2022-01-20 PROCEDURE — 97535 SELF CARE MNGMENT TRAINING: CPT

## 2022-01-20 RX ORDER — GABAPENTIN 300 MG/1
600 CAPSULE ORAL 3 TIMES DAILY
Status: DISCONTINUED | OUTPATIENT
Start: 2022-01-20 | End: 2022-01-22

## 2022-01-20 RX ORDER — BUTALBITAL, ACETAMINOPHEN AND CAFFEINE 300; 40; 50 MG/1; MG/1; MG/1
1 CAPSULE ORAL EVERY 4 HOURS PRN
Status: DISCONTINUED | OUTPATIENT
Start: 2022-01-20 | End: 2022-01-22

## 2022-01-20 RX ORDER — AMITRIPTYLINE HYDROCHLORIDE 25 MG/1
25 TABLET, FILM COATED ORAL NIGHTLY
Status: DISCONTINUED | OUTPATIENT
Start: 2022-01-20 | End: 2022-01-26 | Stop reason: HOSPADM

## 2022-01-20 RX ORDER — OXYCODONE HYDROCHLORIDE 10 MG/1
10 TABLET ORAL EVERY 4 HOURS PRN
Status: DISCONTINUED | OUTPATIENT
Start: 2022-01-20 | End: 2022-01-22

## 2022-01-20 RX ADMIN — METRONIDAZOLE 500 MG: 500 TABLET ORAL at 09:35

## 2022-01-20 RX ADMIN — METHOCARBAMOL 750 MG: 750 TABLET, FILM COATED ORAL at 12:47

## 2022-01-20 RX ADMIN — IBUPROFEN 400 MG: 400 TABLET, FILM COATED ORAL at 17:20

## 2022-01-20 RX ADMIN — IBUPROFEN 400 MG: 400 TABLET, FILM COATED ORAL at 09:37

## 2022-01-20 RX ADMIN — IBUPROFEN 400 MG: 400 TABLET, FILM COATED ORAL at 12:47

## 2022-01-20 RX ADMIN — IBUPROFEN 400 MG: 400 TABLET, FILM COATED ORAL at 06:27

## 2022-01-20 RX ADMIN — OXYCODONE HYDROCHLORIDE 10 MG: 10 TABLET ORAL at 20:36

## 2022-01-20 RX ADMIN — OXYCODONE HYDROCHLORIDE 5 MG: 5 TABLET ORAL at 04:40

## 2022-01-20 RX ADMIN — ACETAMINOPHEN 650 MG: 325 TABLET, FILM COATED ORAL at 04:40

## 2022-01-20 RX ADMIN — GABAPENTIN 600 MG: 300 CAPSULE ORAL at 14:04

## 2022-01-20 RX ADMIN — ENOXAPARIN SODIUM 30 MG: 100 INJECTION SUBCUTANEOUS at 20:37

## 2022-01-20 RX ADMIN — IBUPROFEN 400 MG: 400 TABLET, FILM COATED ORAL at 20:36

## 2022-01-20 RX ADMIN — GABAPENTIN 600 MG: 300 CAPSULE ORAL at 20:35

## 2022-01-20 RX ADMIN — BUTALBITA,ACETAMINOPHEN AND CAFFEINE 1 CAPSULE: 50; 300; 40 CAPSULE ORAL at 20:36

## 2022-01-20 RX ADMIN — ENOXAPARIN SODIUM 30 MG: 100 INJECTION SUBCUTANEOUS at 09:35

## 2022-01-20 RX ADMIN — AMITRIPTYLINE HYDROCHLORIDE 25 MG: 25 TABLET, FILM COATED ORAL at 20:37

## 2022-01-20 RX ADMIN — METHOCARBAMOL 750 MG: 750 TABLET, FILM COATED ORAL at 06:27

## 2022-01-20 RX ADMIN — GABAPENTIN 400 MG: 400 CAPSULE ORAL at 06:27

## 2022-01-20 RX ADMIN — METRONIDAZOLE 500 MG: 500 TABLET ORAL at 20:36

## 2022-01-20 RX ADMIN — OXYCODONE HYDROCHLORIDE 10 MG: 10 TABLET ORAL at 14:01

## 2022-01-20 RX ADMIN — METHOCARBAMOL 750 MG: 750 TABLET, FILM COATED ORAL at 17:20

## 2022-01-20 ASSESSMENT — PAIN SCALES - GENERAL
PAINLEVEL_OUTOF10: 10

## 2022-01-20 ASSESSMENT — PAIN DESCRIPTION - ORIENTATION
ORIENTATION: RIGHT;POSTERIOR
ORIENTATION: POSTERIOR;RIGHT

## 2022-01-20 ASSESSMENT — PAIN DESCRIPTION - FREQUENCY: FREQUENCY: CONTINUOUS

## 2022-01-20 ASSESSMENT — PAIN DESCRIPTION - PAIN TYPE
TYPE: ACUTE PAIN
TYPE: ACUTE PAIN

## 2022-01-20 ASSESSMENT — PAIN DESCRIPTION - DESCRIPTORS: DESCRIPTORS: ACHING;CONSTANT

## 2022-01-20 ASSESSMENT — PAIN DESCRIPTION - LOCATION
LOCATION: HEAD
LOCATION: HEAD

## 2022-01-20 ASSESSMENT — PAIN DESCRIPTION - ONSET: ONSET: ON-GOING
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
## 2022-01-20 NOTE — PLAN OF CARE
Problem: Falls - Risk of:  Goal: Will remain free from falls  Description: Will remain free from falls  1/20/2022 1549 by Jacqueline Weinberg RN  Outcome: Ongoing  Note: Patient remains free of falls and injuries throughout shift. Bed remains in the lowest position, wheels locked, call light and bedside table are within reach. Problem: Skin Integrity:  Goal: Will show no infection signs and symptoms  Description: Will show no infection signs and symptoms  1/20/2022 1549 by Jacqueline Weinberg RN  Outcome: Ongoing  Note: No signs of increased skin or tissue breakdown is noted. See Head to Toe/LDA assessments in flowsheets. Problem: Pain:  Goal: Pain level will decrease  Description: Pain level will decrease  1/20/2022 1549 by Jacqueline Weinberg RN  Outcome: Ongoing  Note: Patient's pain is well controlled with current regimen. See MAR.       Problem: Musculor/Skeletal Functional Status  Goal: Highest potential functional level  1/20/2022 1549 by Jacqueline Weinberg RN  Outcome: Ongoing     Problem: Nutrition  Goal: Optimal nutrition therapy  1/20/2022 1549 by Jacqueline Weinberg RN  Outcome: Ongoing

## 2022-01-20 NOTE — PLAN OF CARE
Problem: Falls - Risk of:  Goal: Will remain free from falls  Description: Will remain free from falls  Outcome: Ongoing  Goal: Absence of physical injury  Description: Absence of physical injury  Outcome: Ongoing     Problem: Skin Integrity:  Goal: Will show no infection signs and symptoms  Description: Will show no infection signs and symptoms  Outcome: Ongoing  Goal: Absence of new skin breakdown  Description: Absence of new skin breakdown  Outcome: Ongoing     Problem: Pain:  Goal: Pain level will decrease  Description: Pain level will decrease  Outcome: Ongoing  Goal: Control of acute pain  Description: Control of acute pain  Outcome: Ongoing  Goal: Control of chronic pain  Description: Control of chronic pain  Outcome: Ongoing     Problem: Musculor/Skeletal Functional Status  Goal: Highest potential functional level  Outcome: Ongoing  Goal: Absence of falls  Outcome: Ongoing     Problem: Nutrition  Goal: Optimal nutrition therapy  1/20/2022 0552 by Yas Woodard LPN  Outcome: Ongoing  1/19/2022 1745 by Bonifacio Judge RD, LD  Outcome: Ongoing

## 2022-01-20 NOTE — PROGRESS NOTES
Pt states she is in a lot of pain. Rating pain 10/10. States current pain regimen is not helping at all. Notified Dr. Minerva Luna. New orders 2:15pm    Dr. Minerva Luna increased Oxycodone to 10 mg and Gabapentin to 600 mg.
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
## 2022-01-20 NOTE — PROGRESS NOTES
Pt's father is here in the room very angry and demanding to speak to manager or house supervisor. I tried to explain pain regimen changes but didn't allow me to. He said, \" nobody here is taking care of my daughter, she has been complaining of head pain and nobody seems to care, all you are doing is  keep giving her medications. \"     House supervisor was notified and is coming to speak with pt's father.
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
## 2022-01-21 PROCEDURE — 1180000000 HC REHAB R&B

## 2022-01-21 PROCEDURE — 97535 SELF CARE MNGMENT TRAINING: CPT

## 2022-01-21 PROCEDURE — 97530 THERAPEUTIC ACTIVITIES: CPT

## 2022-01-21 PROCEDURE — 6370000000 HC RX 637 (ALT 250 FOR IP): Performed by: PHYSICAL MEDICINE & REHABILITATION

## 2022-01-21 PROCEDURE — 97542 WHEELCHAIR MNGMENT TRAINING: CPT

## 2022-01-21 PROCEDURE — 6360000002 HC RX W HCPCS: Performed by: NURSE PRACTITIONER

## 2022-01-21 PROCEDURE — 97110 THERAPEUTIC EXERCISES: CPT

## 2022-01-21 PROCEDURE — 99232 SBSQ HOSP IP/OBS MODERATE 35: CPT | Performed by: INTERNAL MEDICINE

## 2022-01-21 PROCEDURE — 97116 GAIT TRAINING THERAPY: CPT

## 2022-01-21 PROCEDURE — 6370000000 HC RX 637 (ALT 250 FOR IP): Performed by: NURSE PRACTITIONER

## 2022-01-21 RX ADMIN — IBUPROFEN 400 MG: 400 TABLET, FILM COATED ORAL at 04:53

## 2022-01-21 RX ADMIN — GABAPENTIN 600 MG: 300 CAPSULE ORAL at 07:18

## 2022-01-21 RX ADMIN — METHOCARBAMOL 750 MG: 750 TABLET, FILM COATED ORAL at 18:28

## 2022-01-21 RX ADMIN — OXYCODONE HYDROCHLORIDE 10 MG: 10 TABLET ORAL at 04:53

## 2022-01-21 RX ADMIN — METHOCARBAMOL 750 MG: 750 TABLET, FILM COATED ORAL at 23:54

## 2022-01-21 RX ADMIN — POLYETHYLENE GLYCOL 3350 17 G: 17 POWDER, FOR SOLUTION ORAL at 07:18

## 2022-01-21 RX ADMIN — OXYCODONE HYDROCHLORIDE 10 MG: 10 TABLET ORAL at 14:36

## 2022-01-21 RX ADMIN — IBUPROFEN 400 MG: 400 TABLET, FILM COATED ORAL at 00:18

## 2022-01-21 RX ADMIN — METRONIDAZOLE 500 MG: 500 TABLET ORAL at 07:19

## 2022-01-21 RX ADMIN — OXYCODONE HYDROCHLORIDE 10 MG: 10 TABLET ORAL at 10:05

## 2022-01-21 RX ADMIN — ENOXAPARIN SODIUM 30 MG: 100 INJECTION SUBCUTANEOUS at 07:19

## 2022-01-21 RX ADMIN — IBUPROFEN 400 MG: 400 TABLET, FILM COATED ORAL at 11:17

## 2022-01-21 RX ADMIN — IBUPROFEN 400 MG: 400 TABLET, FILM COATED ORAL at 20:12

## 2022-01-21 RX ADMIN — OXYCODONE HYDROCHLORIDE 10 MG: 10 TABLET ORAL at 22:42

## 2022-01-21 RX ADMIN — GABAPENTIN 600 MG: 300 CAPSULE ORAL at 14:36

## 2022-01-21 RX ADMIN — BUTALBITA,ACETAMINOPHEN AND CAFFEINE 1 CAPSULE: 50; 300; 40 CAPSULE ORAL at 04:54

## 2022-01-21 RX ADMIN — METHOCARBAMOL 750 MG: 750 TABLET, FILM COATED ORAL at 11:17

## 2022-01-21 RX ADMIN — METHOCARBAMOL 750 MG: 750 TABLET, FILM COATED ORAL at 00:18

## 2022-01-21 RX ADMIN — AMITRIPTYLINE HYDROCHLORIDE 25 MG: 25 TABLET, FILM COATED ORAL at 20:11

## 2022-01-21 RX ADMIN — METHOCARBAMOL 750 MG: 750 TABLET, FILM COATED ORAL at 04:53

## 2022-01-21 RX ADMIN — IBUPROFEN 400 MG: 400 TABLET, FILM COATED ORAL at 07:19

## 2022-01-21 RX ADMIN — IBUPROFEN 400 MG: 400 TABLET, FILM COATED ORAL at 14:36

## 2022-01-21 RX ADMIN — OXYCODONE HYDROCHLORIDE 10 MG: 10 TABLET ORAL at 18:28

## 2022-01-21 RX ADMIN — ENOXAPARIN SODIUM 30 MG: 100 INJECTION SUBCUTANEOUS at 20:11

## 2022-01-21 RX ADMIN — METRONIDAZOLE 500 MG: 500 TABLET ORAL at 20:12

## 2022-01-21 RX ADMIN — GABAPENTIN 600 MG: 300 CAPSULE ORAL at 20:11

## 2022-01-21 RX ADMIN — OXYCODONE HYDROCHLORIDE 10 MG: 10 TABLET ORAL at 00:18

## 2022-01-21 RX ADMIN — IBUPROFEN 400 MG: 400 TABLET, FILM COATED ORAL at 23:54

## 2022-01-21 ASSESSMENT — 9 HOLE PEG TEST
TEST_RESULT: IMPAIRED
TEST_RESULT: IMPAIRED
TESTTIME_SECONDS: 32.85
TESTTIME_SECONDS: 23.72

## 2022-01-21 ASSESSMENT — PAIN DESCRIPTION - ORIENTATION
ORIENTATION: POSTERIOR
ORIENTATION: POSTERIOR

## 2022-01-21 ASSESSMENT — PAIN SCALES - GENERAL
PAINLEVEL_OUTOF10: 9
PAINLEVEL_OUTOF10: 6
PAINLEVEL_OUTOF10: 9
PAINLEVEL_OUTOF10: 8
PAINLEVEL_OUTOF10: 9
PAINLEVEL_OUTOF10: 6
PAINLEVEL_OUTOF10: 6
PAINLEVEL_OUTOF10: 9
PAINLEVEL_OUTOF10: 9
PAINLEVEL_OUTOF10: 10
PAINLEVEL_OUTOF10: 4
PAINLEVEL_OUTOF10: 9
PAINLEVEL_OUTOF10: 10
PAINLEVEL_OUTOF10: 9
PAINLEVEL_OUTOF10: 4
PAINLEVEL_OUTOF10: 4

## 2022-01-21 ASSESSMENT — PAIN DESCRIPTION - ONSET: ONSET: ON-GOING

## 2022-01-21 ASSESSMENT — PAIN DESCRIPTION - DESCRIPTORS: DESCRIPTORS: ACHING;CONSTANT

## 2022-01-21 ASSESSMENT — PAIN DESCRIPTION - LOCATION
LOCATION: HEAD;NECK

## 2022-01-21 ASSESSMENT — PAIN DESCRIPTION - PAIN TYPE
TYPE: ACUTE PAIN

## 2022-01-21 ASSESSMENT — PAIN DESCRIPTION - FREQUENCY: FREQUENCY: CONTINUOUS
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
## 2022-01-21 NOTE — PLAN OF CARE
Problem: Falls - Risk of:  Goal: Will remain free from falls  Description: Will remain free from falls  1/21/2022 1536 by Cesar Clement RN  Outcome: Ongoing     Problem: Skin Integrity:  Goal: Will show no infection signs and symptoms  Description: Will show no infection signs and symptoms  1/21/2022 1536 by Cesar Clement RN  Outcome: Ongoing     Problem: Pain:  Goal: Pain level will decrease  Description: Pain level will decrease  1/21/2022 1536 by Cesar Clement RN  Outcome: Ongoing
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
## 2022-01-21 NOTE — PLAN OF CARE
Problem: Falls - Risk of:  Goal: Will remain free from falls  Description: Will remain free from falls  1/21/2022 0218 by Suresh Hatch RN  Outcome: Ongoing  1/20/2022 1549 by Lieutenant Malini RN  Outcome: Ongoing  Note: Patient remains free of falls and injuries throughout shift. Bed remains in the lowest position, wheels locked, call light and bedside table are within reach. Goal: Absence of physical injury  Description: Absence of physical injury  1/21/2022 0218 by Suresh Hatch RN  Outcome: Ongoing  1/20/2022 1549 by Lieutenant Malini RN  Outcome: Ongoing     Problem: Skin Integrity:  Goal: Will show no infection signs and symptoms  Description: Will show no infection signs and symptoms  1/21/2022 0218 by Suresh Hatch RN  Outcome: Ongoing  1/20/2022 1549 by Lieutenant Malini RN  Outcome: Ongoing  Note: No signs of increased skin or tissue breakdown is noted. See Head to Toe/LDA assessments in flowsheets. Goal: Absence of new skin breakdown  Description: Absence of new skin breakdown  1/21/2022 0218 by Suresh Hatch RN  Outcome: Ongoing  1/20/2022 1549 by Lieutenant Malini RN  Outcome: Ongoing     Problem: Pain:  Goal: Pain level will decrease  Description: Pain level will decrease  1/21/2022 0218 by Suresh Hatch RN  Outcome: Ongoing  1/20/2022 1549 by Lieutenant Malini RN  Outcome: Ongoing  Note: Patient's pain is well controlled with current regimen. See MAR.    Goal: Control of acute pain  Description: Control of acute pain  1/21/2022 0218 by Suresh Hatch RN  Outcome: Ongoing  1/20/2022 1549 by Lieutenant Malini RN  Outcome: Ongoing  Goal: Control of chronic pain  Description: Control of chronic pain  1/21/2022 0218 by Suresh Hatch RN  Outcome: Ongoing  1/20/2022 1549 by Lieutenant Malini RN  Outcome: Ongoing     Problem: Musculor/Skeletal Functional Status  Goal: Highest potential functional level  1/21/2022 0218 by Suresh Hatch RN  Outcome: Ongoing  1/20/2022 1549 by Gail Fuentes RN  Outcome: Ongoing  Goal: Absence of falls  1/21/2022 0218 by Lucy Abebe RN  Outcome: Ongoing  1/20/2022 1549 by Gail Fuentes RN  Outcome: Ongoing
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
## 2022-01-22 LAB
ALT SERPL-CCNC: 281 U/L (ref 5–33)
ANION GAP SERPL CALCULATED.3IONS-SCNC: 9 MMOL/L (ref 9–17)
AST SERPL-CCNC: 160 U/L
BUN BLDV-MCNC: 10 MG/DL (ref 6–20)
BUN/CREAT BLD: ABNORMAL (ref 9–20)
CALCIUM SERPL-MCNC: 8.8 MG/DL (ref 8.6–10.4)
CHLORIDE BLD-SCNC: 101 MMOL/L (ref 98–107)
CO2: 24 MMOL/L (ref 20–31)
CREAT SERPL-MCNC: 0.68 MG/DL (ref 0.5–0.9)
GFR AFRICAN AMERICAN: >60 ML/MIN
GFR NON-AFRICAN AMERICAN: >60 ML/MIN
GFR SERPL CREATININE-BSD FRML MDRD: ABNORMAL ML/MIN/{1.73_M2}
GFR SERPL CREATININE-BSD FRML MDRD: ABNORMAL ML/MIN/{1.73_M2}
GLUCOSE BLD-MCNC: 114 MG/DL (ref 70–99)
HAV IGM SER IA-ACNC: NONREACTIVE
HEPATITIS B CORE IGM ANTIBODY: NONREACTIVE
HEPATITIS B SURFACE ANTIGEN: NONREACTIVE
HEPATITIS C ANTIBODY: NONREACTIVE
POTASSIUM SERPL-SCNC: 4.3 MMOL/L (ref 3.7–5.3)
SODIUM BLD-SCNC: 134 MMOL/L (ref 135–144)

## 2022-01-22 PROCEDURE — 6370000000 HC RX 637 (ALT 250 FOR IP): Performed by: PHYSICAL MEDICINE & REHABILITATION

## 2022-01-22 PROCEDURE — 84460 ALANINE AMINO (ALT) (SGPT): CPT

## 2022-01-22 PROCEDURE — 80074 ACUTE HEPATITIS PANEL: CPT

## 2022-01-22 PROCEDURE — 6360000002 HC RX W HCPCS: Performed by: NURSE PRACTITIONER

## 2022-01-22 PROCEDURE — 99232 SBSQ HOSP IP/OBS MODERATE 35: CPT | Performed by: INTERNAL MEDICINE

## 2022-01-22 PROCEDURE — 51798 US URINE CAPACITY MEASURE: CPT

## 2022-01-22 PROCEDURE — 84450 TRANSFERASE (AST) (SGOT): CPT

## 2022-01-22 PROCEDURE — 97535 SELF CARE MNGMENT TRAINING: CPT

## 2022-01-22 PROCEDURE — 36415 COLL VENOUS BLD VENIPUNCTURE: CPT

## 2022-01-22 PROCEDURE — 6370000000 HC RX 637 (ALT 250 FOR IP): Performed by: NURSE PRACTITIONER

## 2022-01-22 PROCEDURE — 99232 SBSQ HOSP IP/OBS MODERATE 35: CPT | Performed by: PHYSICAL MEDICINE & REHABILITATION

## 2022-01-22 PROCEDURE — 97110 THERAPEUTIC EXERCISES: CPT

## 2022-01-22 PROCEDURE — 99253 IP/OBS CNSLTJ NEW/EST LOW 45: CPT | Performed by: PSYCHIATRY & NEUROLOGY

## 2022-01-22 PROCEDURE — 1180000000 HC REHAB R&B

## 2022-01-22 PROCEDURE — 97530 THERAPEUTIC ACTIVITIES: CPT

## 2022-01-22 PROCEDURE — 80048 BASIC METABOLIC PNL TOTAL CA: CPT

## 2022-01-22 PROCEDURE — 6360000002 HC RX W HCPCS: Performed by: PSYCHIATRY & NEUROLOGY

## 2022-01-22 PROCEDURE — 97116 GAIT TRAINING THERAPY: CPT

## 2022-01-22 RX ORDER — KETOROLAC TROMETHAMINE 30 MG/ML
30 INJECTION, SOLUTION INTRAMUSCULAR; INTRAVENOUS ONCE
Status: COMPLETED | OUTPATIENT
Start: 2022-01-22 | End: 2022-01-22

## 2022-01-22 RX ORDER — DEXAMETHASONE SODIUM PHOSPHATE 10 MG/ML
10 INJECTION, SOLUTION INTRAMUSCULAR; INTRAVENOUS ONCE
Status: COMPLETED | OUTPATIENT
Start: 2022-01-22 | End: 2022-01-23

## 2022-01-22 RX ORDER — GABAPENTIN 400 MG/1
400 CAPSULE ORAL 3 TIMES DAILY
Status: DISCONTINUED | OUTPATIENT
Start: 2022-01-22 | End: 2022-01-25

## 2022-01-22 RX ORDER — OXYCODONE HYDROCHLORIDE 5 MG/1
5 TABLET ORAL EVERY 4 HOURS PRN
Status: DISCONTINUED | OUTPATIENT
Start: 2022-01-22 | End: 2022-01-23

## 2022-01-22 RX ORDER — METHOCARBAMOL 500 MG/1
500 TABLET, FILM COATED ORAL 3 TIMES DAILY
Status: DISCONTINUED | OUTPATIENT
Start: 2022-01-22 | End: 2022-01-23

## 2022-01-22 RX ORDER — OXYCODONE HYDROCHLORIDE 10 MG/1
10 TABLET ORAL EVERY 4 HOURS PRN
Status: DISCONTINUED | OUTPATIENT
Start: 2022-01-22 | End: 2022-01-23

## 2022-01-22 RX ADMIN — OXYCODONE HYDROCHLORIDE 5 MG: 5 TABLET ORAL at 22:16

## 2022-01-22 RX ADMIN — IBUPROFEN 400 MG: 400 TABLET, FILM COATED ORAL at 11:30

## 2022-01-22 RX ADMIN — METHOCARBAMOL 750 MG: 750 TABLET, FILM COATED ORAL at 05:07

## 2022-01-22 RX ADMIN — ENOXAPARIN SODIUM 30 MG: 100 INJECTION SUBCUTANEOUS at 07:27

## 2022-01-22 RX ADMIN — AMITRIPTYLINE HYDROCHLORIDE 25 MG: 25 TABLET, FILM COATED ORAL at 20:49

## 2022-01-22 RX ADMIN — IBUPROFEN 400 MG: 400 TABLET, FILM COATED ORAL at 15:07

## 2022-01-22 RX ADMIN — POLYETHYLENE GLYCOL 3350 17 G: 17 POWDER, FOR SOLUTION ORAL at 13:50

## 2022-01-22 RX ADMIN — METHOCARBAMOL 750 MG: 750 TABLET, FILM COATED ORAL at 11:28

## 2022-01-22 RX ADMIN — OXYCODONE HYDROCHLORIDE 10 MG: 10 TABLET ORAL at 06:54

## 2022-01-22 RX ADMIN — OXYCODONE HYDROCHLORIDE 10 MG: 10 TABLET ORAL at 02:54

## 2022-01-22 RX ADMIN — IBUPROFEN 400 MG: 400 TABLET, FILM COATED ORAL at 07:27

## 2022-01-22 RX ADMIN — KETOROLAC TROMETHAMINE 30 MG: 30 INJECTION, SOLUTION INTRAMUSCULAR; INTRAVENOUS at 20:44

## 2022-01-22 RX ADMIN — OXYCODONE HYDROCHLORIDE 10 MG: 10 TABLET ORAL at 15:07

## 2022-01-22 RX ADMIN — GABAPENTIN 600 MG: 300 CAPSULE ORAL at 07:26

## 2022-01-22 RX ADMIN — METRONIDAZOLE 500 MG: 500 TABLET ORAL at 07:26

## 2022-01-22 RX ADMIN — GABAPENTIN 400 MG: 400 CAPSULE ORAL at 20:43

## 2022-01-22 RX ADMIN — OXYCODONE HYDROCHLORIDE 10 MG: 10 TABLET ORAL at 11:28

## 2022-01-22 RX ADMIN — IBUPROFEN 400 MG: 400 TABLET, FILM COATED ORAL at 05:07

## 2022-01-22 RX ADMIN — GABAPENTIN 600 MG: 300 CAPSULE ORAL at 13:49

## 2022-01-22 RX ADMIN — METRONIDAZOLE 500 MG: 500 TABLET ORAL at 20:43

## 2022-01-22 RX ADMIN — ENOXAPARIN SODIUM 30 MG: 100 INJECTION SUBCUTANEOUS at 20:44

## 2022-01-22 ASSESSMENT — PAIN DESCRIPTION - PAIN TYPE
TYPE: ACUTE PAIN
TYPE: ACUTE PAIN;NEUROPATHIC PAIN

## 2022-01-22 ASSESSMENT — PAIN DESCRIPTION - ONSET
ONSET: ON-GOING

## 2022-01-22 ASSESSMENT — PAIN DESCRIPTION - FREQUENCY
FREQUENCY: CONTINUOUS

## 2022-01-22 ASSESSMENT — PAIN DESCRIPTION - ORIENTATION
ORIENTATION: POSTERIOR
ORIENTATION: POSTERIOR
ORIENTATION: RIGHT;POSTERIOR

## 2022-01-22 ASSESSMENT — PAIN SCALES - GENERAL
PAINLEVEL_OUTOF10: 6
PAINLEVEL_OUTOF10: 9
PAINLEVEL_OUTOF10: 6
PAINLEVEL_OUTOF10: 9
PAINLEVEL_OUTOF10: 9
PAINLEVEL_OUTOF10: 7
PAINLEVEL_OUTOF10: 6
PAINLEVEL_OUTOF10: 9

## 2022-01-22 ASSESSMENT — PAIN DESCRIPTION - LOCATION
LOCATION: HEAD;NECK
LOCATION: NECK;HEAD
LOCATION: HEAD;NECK

## 2022-01-22 ASSESSMENT — PAIN DESCRIPTION - DESCRIPTORS
DESCRIPTORS: ACHING;BURNING
DESCRIPTORS: ACHING;CONSTANT

## 2022-01-22 NOTE — PROGRESS NOTES
Patient called for nurse. Upon entry, she said she had an episode of incontinence and requested to take a shower. Writer assisted patient to bathroom, gathered towels and fresh linens, and changed bed while she showered. When finished, writer stood by while she carefully stepped out of shower, handing her clothes and assisting with lower garments. Gripper socks applied, patient assisted back to bed, dirty clothes and personal blanket placed in big [employer] bag to wash in OT.
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
## 2022-01-22 NOTE — PROGRESS NOTES
Patient reported to have incontinence episode of urine. Patient stated she had no idea she went. This is a new finding. Writer will let on coming shift be notified.
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
## 2022-01-22 NOTE — PLAN OF CARE
Problem: Falls - Risk of:  Goal: Will remain free from falls  Description: Will remain free from falls  1/22/2022 1419 by Justin Niño RN  Outcome: Ongoing  Note: Patient remains free of falls and injuries throughout shift. Bed remains in the lowest position, wheels locked, call light and bedside table are within reach. Problem: Skin Integrity:  Goal: Will show no infection signs and symptoms  Description: Will show no infection signs and symptoms  1/22/2022 1419 by Justin Niño RN  Outcome: Ongoing  Note: No signs of increased skin or tissue breakdown is noted. See Head to Toe/LDA assessments in flowsheets. Problem: Pain:  Goal: Pain level will decrease  Description: Pain level will decrease  1/22/2022 1419 by Justin Niño RN  Outcome: Ongoing  Note: Patient's pain is well controlled with current regimen. See MAR.       Problem: Musculor/Skeletal Functional Status  Goal: Highest potential functional level  1/22/2022 1419 by Justin Niño RN  Outcome: Ongoing     Problem: Nutrition  Goal: Optimal nutrition therapy  1/22/2022 1419 by Justin Niño RN  Outcome: Ongoing

## 2022-01-23 PROCEDURE — 97535 SELF CARE MNGMENT TRAINING: CPT

## 2022-01-23 PROCEDURE — 97530 THERAPEUTIC ACTIVITIES: CPT

## 2022-01-23 PROCEDURE — 99232 SBSQ HOSP IP/OBS MODERATE 35: CPT | Performed by: PHYSICAL MEDICINE & REHABILITATION

## 2022-01-23 PROCEDURE — 6370000000 HC RX 637 (ALT 250 FOR IP): Performed by: PHYSICAL MEDICINE & REHABILITATION

## 2022-01-23 PROCEDURE — 51798 US URINE CAPACITY MEASURE: CPT

## 2022-01-23 PROCEDURE — 97116 GAIT TRAINING THERAPY: CPT

## 2022-01-23 PROCEDURE — 6360000002 HC RX W HCPCS: Performed by: NURSE PRACTITIONER

## 2022-01-23 PROCEDURE — 97110 THERAPEUTIC EXERCISES: CPT

## 2022-01-23 PROCEDURE — 6360000002 HC RX W HCPCS: Performed by: PSYCHIATRY & NEUROLOGY

## 2022-01-23 PROCEDURE — 1180000000 HC REHAB R&B

## 2022-01-23 PROCEDURE — 99232 SBSQ HOSP IP/OBS MODERATE 35: CPT | Performed by: INTERNAL MEDICINE

## 2022-01-23 PROCEDURE — 99233 SBSQ HOSP IP/OBS HIGH 50: CPT | Performed by: PSYCHIATRY & NEUROLOGY

## 2022-01-23 RX ORDER — OXYCODONE HYDROCHLORIDE 10 MG/1
10 TABLET ORAL EVERY 6 HOURS PRN
Status: DISCONTINUED | OUTPATIENT
Start: 2022-01-23 | End: 2022-01-25

## 2022-01-23 RX ORDER — OXYCODONE HYDROCHLORIDE 5 MG/1
5 TABLET ORAL EVERY 6 HOURS PRN
Status: DISCONTINUED | OUTPATIENT
Start: 2022-01-23 | End: 2022-01-25

## 2022-01-23 RX ADMIN — DEXAMETHASONE SODIUM PHOSPHATE 10 MG: 10 INJECTION INTRAMUSCULAR; INTRAVENOUS at 02:38

## 2022-01-23 RX ADMIN — OXYCODONE HYDROCHLORIDE 5 MG: 5 TABLET ORAL at 06:24

## 2022-01-23 RX ADMIN — OXYCODONE HYDROCHLORIDE 10 MG: 10 TABLET ORAL at 22:37

## 2022-01-23 RX ADMIN — OXYCODONE HYDROCHLORIDE 10 MG: 10 TABLET ORAL at 10:52

## 2022-01-23 RX ADMIN — ENOXAPARIN SODIUM 30 MG: 100 INJECTION SUBCUTANEOUS at 22:29

## 2022-01-23 RX ADMIN — GABAPENTIN 400 MG: 400 CAPSULE ORAL at 15:12

## 2022-01-23 RX ADMIN — ENOXAPARIN SODIUM 30 MG: 100 INJECTION SUBCUTANEOUS at 07:54

## 2022-01-23 RX ADMIN — AMITRIPTYLINE HYDROCHLORIDE 25 MG: 25 TABLET, FILM COATED ORAL at 22:38

## 2022-01-23 RX ADMIN — GABAPENTIN 400 MG: 400 CAPSULE ORAL at 22:29

## 2022-01-23 RX ADMIN — GABAPENTIN 400 MG: 400 CAPSULE ORAL at 07:54

## 2022-01-23 RX ADMIN — POLYETHYLENE GLYCOL 3350 17 G: 17 POWDER, FOR SOLUTION ORAL at 07:55

## 2022-01-23 RX ADMIN — OXYCODONE HYDROCHLORIDE 5 MG: 5 TABLET ORAL at 16:27

## 2022-01-23 ASSESSMENT — PAIN SCALES - GENERAL
PAINLEVEL_OUTOF10: 10
PAINLEVEL_OUTOF10: 9

## 2022-01-23 ASSESSMENT — PAIN DESCRIPTION - PAIN TYPE: TYPE: ACUTE PAIN

## 2022-01-23 ASSESSMENT — PAIN DESCRIPTION - ORIENTATION: ORIENTATION: POSTERIOR

## 2022-01-23 ASSESSMENT — PAIN DESCRIPTION - LOCATION
LOCATION: HEAD;NECK
LOCATION: HEAD;NECK
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
## 2022-01-23 NOTE — PROGRESS NOTES
Pt. Voids per self in toilet. PVR 0mL.   Electronically signed by Tino Villarreal RN on 1/23/2022 at 3:20 AM

## 2022-01-23 NOTE — PROGRESS NOTES
Patient complained to writer that Toradol did not touch her pain at all and remains around a 8 or 9 . Arnaud Her  let Dr. Meagan Malone know that the pain medication was not affective. Dr. Meagan Malone gave an order of Decadron 10 mg one time dose , Dr. Meagan Malone also stated writer can give 5 mg dose of oxy with the decadron.      Electronically signed by Agusto Candelario LPN on 3/20/8968 at 86:89 PM.
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
## 2022-01-23 NOTE — PLAN OF CARE
Problem: Falls - Risk of:  Goal: Will remain free from falls  Description: Will remain free from falls  1/23/2022 1644 by Citlaly Love RN  Outcome: Ongoing  Note: Patient remains free of falls and injuries throughout shift. Bed remains in the lowest position, wheels locked, call light and bedside table are within reach. Problem: Skin Integrity:  Goal: Will show no infection signs and symptoms  Description: Will show no infection signs and symptoms  1/23/2022 1644 by Citlaly Love RN  Outcome: Ongoing  Note: No signs of increased skin or tissue breakdown is noted. See Head to Toe/LDA assessments in flowsheets. Problem: Pain:  Goal: Pain level will decrease  Description: Pain level will decrease  Outcome: Ongoing  Note: Patient's pain is well controlled with current regimen. See MAR.       Problem: Musculor/Skeletal Functional Status  Goal: Highest potential functional level  Outcome: Ongoing     Problem: Nutrition  Goal: Optimal nutrition therapy  Outcome: Ongoing
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
## 2022-01-24 ENCOUNTER — APPOINTMENT (OUTPATIENT)
Dept: ULTRASOUND IMAGING | Age: 22
DRG: 862 | End: 2022-01-24
Attending: PHYSICAL MEDICINE & REHABILITATION
Payer: MEDICAID

## 2022-01-24 PROCEDURE — 97110 THERAPEUTIC EXERCISES: CPT

## 2022-01-24 PROCEDURE — 6370000000 HC RX 637 (ALT 250 FOR IP): Performed by: PHYSICAL MEDICINE & REHABILITATION

## 2022-01-24 PROCEDURE — 97530 THERAPEUTIC ACTIVITIES: CPT

## 2022-01-24 PROCEDURE — 97535 SELF CARE MNGMENT TRAINING: CPT

## 2022-01-24 PROCEDURE — 99232 SBSQ HOSP IP/OBS MODERATE 35: CPT | Performed by: STUDENT IN AN ORGANIZED HEALTH CARE EDUCATION/TRAINING PROGRAM

## 2022-01-24 PROCEDURE — 99232 SBSQ HOSP IP/OBS MODERATE 35: CPT | Performed by: INTERNAL MEDICINE

## 2022-01-24 PROCEDURE — 6360000002 HC RX W HCPCS: Performed by: NURSE PRACTITIONER

## 2022-01-24 PROCEDURE — 76705 ECHO EXAM OF ABDOMEN: CPT

## 2022-01-24 PROCEDURE — 97116 GAIT TRAINING THERAPY: CPT

## 2022-01-24 PROCEDURE — 1180000000 HC REHAB R&B

## 2022-01-24 RX ADMIN — GABAPENTIN 400 MG: 400 CAPSULE ORAL at 14:45

## 2022-01-24 RX ADMIN — OXYCODONE HYDROCHLORIDE 10 MG: 10 TABLET ORAL at 21:06

## 2022-01-24 RX ADMIN — GABAPENTIN 400 MG: 400 CAPSULE ORAL at 08:13

## 2022-01-24 RX ADMIN — ENOXAPARIN SODIUM 30 MG: 100 INJECTION SUBCUTANEOUS at 21:06

## 2022-01-24 RX ADMIN — GABAPENTIN 400 MG: 400 CAPSULE ORAL at 21:06

## 2022-01-24 RX ADMIN — OXYCODONE HYDROCHLORIDE 5 MG: 5 TABLET ORAL at 08:13

## 2022-01-24 RX ADMIN — POLYETHYLENE GLYCOL 3350 17 G: 17 POWDER, FOR SOLUTION ORAL at 08:13

## 2022-01-24 RX ADMIN — OXYCODONE HYDROCHLORIDE 10 MG: 10 TABLET ORAL at 14:46

## 2022-01-24 RX ADMIN — ENOXAPARIN SODIUM 30 MG: 100 INJECTION SUBCUTANEOUS at 08:13

## 2022-01-24 RX ADMIN — AMITRIPTYLINE HYDROCHLORIDE 25 MG: 25 TABLET, FILM COATED ORAL at 21:06

## 2022-01-24 ASSESSMENT — PAIN DESCRIPTION - PAIN TYPE
TYPE: ACUTE PAIN

## 2022-01-24 ASSESSMENT — PAIN DESCRIPTION - LOCATION
LOCATION: BACK;HEAD;NECK
LOCATION: NECK
LOCATION: HEAD;NECK
LOCATION: HEAD
LOCATION: NECK

## 2022-01-24 ASSESSMENT — PAIN SCALES - GENERAL
PAINLEVEL_OUTOF10: 9
PAINLEVEL_OUTOF10: 10
PAINLEVEL_OUTOF10: 10
PAINLEVEL_OUTOF10: 5
PAINLEVEL_OUTOF10: 8
PAINLEVEL_OUTOF10: 9

## 2022-01-24 ASSESSMENT — PAIN DESCRIPTION - DESCRIPTORS
DESCRIPTORS: PRESSURE
DESCRIPTORS: ACHING;CONSTANT
DESCRIPTORS: ACHING;CONSTANT

## 2022-01-24 ASSESSMENT — PAIN DESCRIPTION - ORIENTATION
ORIENTATION: UPPER;POSTERIOR
ORIENTATION: POSTERIOR
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
## 2022-01-24 NOTE — PROGRESS NOTES
Physical Medicine & Rehabilitation  Progress Note      Subjective:      José Luis Bautista is a 19 y.o. female with mild TBI and C1-C2 fractures. She reports doing okay today. She notes continued pain at the posterior scalp. She states that therapy is going fine. She denies any other acute concerns. ROS:  Denies fevers, chills, sweats. No chest pain, palpitations, lightheadedness. Denies coughing, wheezing or shortness of breath. Denies abdominal pain, nausea, diarrhea or constipation. No new areas of joint pain. Denies new areas of numbness or weakness. Denies new anxiety or depression issues. No new skin problems. Rehabilitation:   Progressing in therapies.     PT:  Restrictions/Precautions: General Precautions  Implants present? :  (pt denies)  Other position/activity restrictions: Cervical collar on at all times   Required Braces or Orthoses  Cervical: c-collar (Cervical collar on at all times )   Transfers  Sit to Stand: Supervision  Stand to sit: Supervision  Bed to Chair: Supervision  Stand Pivot Transfers: Stand by assistance (with and without device)  Comment: transfers without device  Ambulation 1  Surface: level tile  Device: No Device  Other Apparatus:  (C-collar)  Assistance: Stand by assistance  Quality of Gait: slow, guarded movements; no LOB  Gait Deviations: Slow Anne-Marie,Decreased step length,Decreased step height  Distance: 140ft  Comments: pt demonstrated safe amb    Transfers  Sit to Stand: Supervision  Stand to sit: Supervision  Bed to Chair: Supervision  Stand Pivot Transfers: Stand by assistance (with and without device)  Comment: transfers without device  Ambulation  Ambulation?: Yes  More Ambulation?: Yes  Ambulation 1  Surface: level tile  Device: No Device  Other Apparatus:  (C-collar)  Assistance: Stand by assistance  Quality of Gait: slow, guarded movements; no LOB  Gait Deviations: Slow Anne-Marie,Decreased step length,Decreased step height  Distance: 140ft  Comments: pt demonstrated safe amb    Surface: level tile  Ambulation 1  Surface: level tile  Device: No Device  Other Apparatus:  (C-collar)  Assistance: Stand by assistance  Quality of Gait: slow, guarded movements; no LOB  Gait Deviations: Slow Anne-Marie,Decreased step length,Decreased step height  Distance: 140ft  Comments: pt demonstrated safe amb    OT:  ADL  Equipment Provided: Reacher  Feeding: Independent  Grooming: Modified independent  (SBA while standing sinkside; for oral care)  UE Bathing: Stand by assistance,Setup (Seated tub transfer; did not complete hair washing this date)  LE Bathing: Setup  UE Dressing: Setup  LE Dressing: Stand by assistance,Setup (SBA for standing for pulling underwear/pants over hips)  Toileting: None (did not complete/request this date )  Additional Comments: C-collar remained on for full shower this date per cervical precautions    Instrumental ADL's  Instrumental ADLs: Yes     Balance  Sitting Balance: Modified independent   Standing Balance: Stand by assistance   Standing Balance  Time: AM: <1 min x2; PM: 9-10 mins; 12-13 mins   Activity: functional mobilty/transfers, self care tasks; PM: Pt engaged in dynamic standing activity, balloon tap, while adhereing to cervical percautions, to increase ability to self correct should balance be lost, tolerance and endurance needed to participate in daily activities. Pt SBA throughout actvity with no LOB noted. . No UE support utilized during this activity. Comment: G tolerance noted with no LOB. Shower GBs, Cane and sink utilized for UE support PRN.   Functional Mobility  Functional - Mobility Device: Cane (PM: no device )  Activity: To/from bathroom,Other,Transport items,Retrieve items (around room)  Assist Level: Stand by assistance  Functional Mobility Comments: G hand placement and no LOB noted     Bed mobility  Rolling to Left: Contact guard assistance  Rolling to Right: Stand by assistance  Supine to Sit: Modified independent  Sit to Supine: Modified independent  Scooting: Supervision  Comment: HOB elevated, bed rails utilized  Transfers  Stand Pivot Transfers: Contact guard assistance  Sit to stand: Supervision  Stand to sit: Supervision  Transfer Comments: Rishi GALLEGO safety this AM   Toilet Transfers  Toilet - Technique: Ambulating  Equipment Used: Grab bars  Toilet Transfer: Supervision  Toilet Transfers Comments: pt demonstrates good safety this date. Shower Transfers  Shower - Transfer From: [employer] - Transfer Type: To and From  Shower - Transfer To: Transfer tub bench  Shower - Technique: Ambulating  Shower Transfers: Stand by assistance  Shower Transfers Comments: SBA for over shower threshold, GB utilized PRN for balance maintenance       SPEECH:      Current Medications:   Current Facility-Administered Medications: oxyCODONE (ROXICODONE) immediate release tablet 5 mg, 5 mg, Oral, Q6H PRN **OR** oxyCODONE HCl (OXY-IR) immediate release tablet 10 mg, 10 mg, Oral, Q6H PRN  gabapentin (NEURONTIN) capsule 400 mg, 400 mg, Oral, TID  amitriptyline (ELAVIL) tablet 25 mg, 25 mg, Oral, Nightly  enoxaparin (LOVENOX) injection 30 mg, 30 mg, SubCUTAneous, BID  polyethylene glycol (GLYCOLAX) packet 17 g, 17 g, Oral, Daily  senna (SENOKOT) tablet 17.2 mg, 2 tablet, Oral, Daily PRN  bisacodyl (DULCOLAX) suppository 10 mg, 10 mg, Rectal, Daily PRN      Objective:  /88   Pulse 96   Temp 98.1 °F (36.7 °C) (Oral)   Resp 18   Ht 5' 3\" (1.6 m)   Wt 226 lb (102.5 kg)   LMP 12/26/2021   SpO2 100%   BMI 40.03 kg/m²       GEN: Well developed, well nourished, no acute distress  HEENT: NCAT. EOM grossly intact. Hearing grossly intact. Mucous membranes pink and moist.  Cervical collar in place. RESP: Normal breath sounds with no wheezing, rales, or rhonchi. Respirations WNL and unlabored. CV: Regular rate and rhythm. No murmurs, rubs, or gallops. ABD: Soft, non-distended, BS+ and equal.  NEURO: Alert. Speech fluent.   Sensation to light touch intact. MSK:  Muscle tone and bulk are normal bilaterally. Strength 5/5 in bilateral upper limbs. Strength 5/5 with bilateral ankle dorsiflexion and plantarflexion. LIMBS: No edema in bilateral lower limbs. SKIN: Warm and dry with good turgor. PSYCH: Mood WNL. Affect WNL. Appropriately interactive. Diagnostics:     CBC: No results for input(s): WBC, RBC, HGB, HCT, MCV, RDW, PLT in the last 72 hours. BMP:   Recent Labs     01/22/22  1533   *   K 4.3      CO2 24   BUN 10   CREATININE 0.68   GLUCOSE 114*     BNP: No results for input(s): BNP in the last 72 hours. PT/INR: No results for input(s): PROTIME, INR in the last 72 hours. APTT: No results for input(s): APTT in the last 72 hours. CARDIAC ENZYMES: No results for input(s): CKMB, CKMBINDEX, TROPONINT in the last 72 hours. Invalid input(s): CKTOTAL;3  FASTING LIPID PANEL:No results found for: CHOL, HDL, TRIG  LIVER PROFILE:   Recent Labs     01/22/22  1533   *   *        US liver, 1/24/22:  Impression   Negative right upper quadrant ultrasound. Impression/Plan:   Impaired ADLs, gait, and mobility due to:    1. C1-C2 fractures: PT/OT  for gait, mobility, strengthening, endurance, ADLs, and self care. Cervical collar. Follow up with Dr. Pa Commack. 2. Possible C2 radiculopathy or occipital neuralgia:  Neurology following. CT head showed no new acute findings 1/20 - neurology recommends no additional imaging at this time. Given ketorolac and decadron. Pain control with gabapentin (decreased 1/22), amitriptyline, as-needed oxycodone (decreased 1/23). Fioricet discontinued by neurology. Robaxin, tylenol, and motrin discontinued due to elevated liver enzymes. May benefit from outpatient pain management. 3. Elevated liver enzymes:  Hepatitis panel negative. US liver negative. Will monitor. 4. Mild TBI:  No cognitive deficits on SLP evaluation.   CT head with no new acute findings 1/20.  5. Episodes of bladder incontinence:  PVRs appropriate. 6. Acute back pain:  Pain control as above  7. Bacterial vaginosis:  Completed flagyl on 1/22  8. Depression, anxiety:  Not currently on medications  9. Bowel Management: Miralax daily, senokot prn, dulcolax prn. 10. DVT prophylaxis:  Lovenox  11.  Internal Medicine for medical management      Electronically signed by Francisco Parker MD on 1/24/2022 at 11:22 PM
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
## 2022-01-24 NOTE — PROGRESS NOTES
Comprehensive Nutrition Assessment    Type and Reason for Visit:  Reassess    Nutrition Recommendations/Plan: Continue current diet. Nutrition Assessment:  Do not disturb sign on door earlier. Nurse reports pt is eating well. Observed breakfast tray with 100% consumed. Malnutrition Assessment:  Malnutrition Status:  No malnutrition    Context:  Acute Illness     Findings of the 6 clinical characteristics of malnutrition:  Energy Intake:  Mild decrease in energy intake (Comment)  Weight Loss:  No significant weight loss     Body Fat Loss:  No significant body fat loss     Muscle Mass Loss:  No significant muscle mass loss    Fluid Accumulation:  No significant fluid accumulation Extremities   Strength:  Not Performed    Estimated Daily Nutrient Needs:  Energy (kcal):  3392-7676 based on [place] [place] with 1.2-1.2 factor; Weight Used for Energy Requirements:  Admission     Protein (g):   based on 1.8-2 gm per kg; Weight Used for Protein Requirements:  Ideal          Nutrition Related Findings:  Labs and meds reviewed. No edema. Wounds:   (Cervical spine fractures; C-collar in place.)       Current Nutrition Therapies:    ADULT DIET;  Regular    Anthropometric Measures:  · Height: 5' 3\" (160 cm)  · Current Body Weight: 225 lb 15.5 oz (102.5 kg) (May include neck brace)   · Admission Body Weight: 205 lb 14.6 oz (93.4 kg) (Method not specified)    · Usual Body Weight: 184 lb (83.5 kg) (7/26/21)     · Ideal Body Weight: 115 lbs; % Ideal Body Weight 196.5 %   · BMI: 40  · BMI Categories: Obese Class 1 (BMI 30.0-34.9) (Likely true wt in this range)       Nutrition Diagnosis:   · Increased nutrient needs related to  (healing) as evidenced by wounds    Nutrition Interventions:   Food and/or Nutrient Delivery:  Continue Current Diet  Nutrition Education/Counseling:  No recommendation at this time   Coordination of Nutrition Care:       Goals:  PO intake % of meals with adequate protein intake Nutrition Monitoring and Evaluation:   Behavioral-Environmental Outcomes:  None Identified   Food/Nutrient Intake Outcomes:  Food and Nutrient Intake  Physical Signs/Symptoms Outcomes:  Biochemical Data,Fluid Status or Edema,Skin,Weight     Discharge Planning:    Continue current diet     Some areas of assessment may be incomplete due to standard COVID-19 Precautions. Dee Walters R.D., JOVANY.   Phone: [phone]

## 2022-01-24 NOTE — PLAN OF CARE
Problem: Falls - Risk of:  Goal: Will remain free from falls  Description: Will remain free from falls  1/24/2022 0429 by Antonio Muñoz LPN  Outcome: Ongoing  Note: No falls or injuries sustained at this time. No attempts to get out of bed without nursing assistance. Call light within reach and pt. uses appropriately for assistance. Siderails up x 2. Nonskid footwear remains on. Bed in low and locked position. Hourly nursing rounds made. Pt. Alert and oriented, aware of limitations, and exhibits good safety judgement. Pt. uses assistive devices appropriately. Pt. understands individual fall risk factors. Pt. reoriented to surroundings and reminded to use call light with each nurse/patient interaction. Pt. room located close to nurse's station. Bed alarm / Personal alarm remains engaged throughout the shift as a precaution. Problem: Skin Integrity:  Goal: Absence of new skin breakdown  Description: Absence of new skin breakdown  1/24/2022 0429 by Antonio Muñoz LPN  Outcome: Ongoing  Note: Skin assessment completed this shift. Nutrition and Hydration status assessed with adequate intake. Edil Score as charted. Patient able to reposition self for comfort and to prevent breakdown. Skin integrity maintained with no new skin breakdown noted. Skin to high risk pressure areas including coccyx and heels are clear. Problem: Pain:  Goal: Control of chronic pain  Description: Control of chronic pain  1/24/2022 0429 by Antonio Muñoz LPN  Outcome: Ongoing  Note: Pain assessment completed. Pt. able to rest.  Patient medicated with prn Oxycodone for pain this shift as ordered. Patient relates a tolerable level of discomfort with the current medication. Pt. Repositions per self for comfort. Nonverbal cues indicate pain relief. Pt. Rests quietly with eyes closed after pain medication administration. Respirations easy and unlabored. Appears free from distress.

## 2022-01-24 NOTE — PROGRESS NOTES
sit: Supervision  Transfer Comments: Rishi GALLEGO safety this AM  Standing Balance  Time: AM: <1 min x2; PM: 9-10 mins; 12-13 mins   Activity: functional mobilty/transfers, self care tasks; PM: Pt engaged in dynamic standing activity, balloon tap, while adhereing to cervical percautions, to increase ability to self correct should balance be lost, tolerance and endurance needed to participate in daily activities. Pt SBA throughout actvity with no LOB noted. . No UE support utilized during this activity. Comment: G tolerance noted with no LOB. Shower GBs, Cane and sink utilized for UE support PRN. Functional Mobility  Functional - Mobility Device: Cane (PM: no device )  Activity: To/from bathroom; Other;Transport items; Retrieve items (around room)  Assist Level: Stand by assistance  Functional Mobility Comments: G hand placement and no LOB noted  Shower Transfers  Shower - Transfer From: [employer] - Transfer Type: To and From  Shower - Transfer To: Transfer tub bench  Shower - Technique: Ambulating  Shower Transfers: Stand by assistance  Shower Transfers Comments: SBA for over shower threshold, GB utilized PRN for balance maintenance     Type of ROM/Therapeutic Exercise  Type of ROM/Therapeutic Exercise: Free weights (1# BUE x15)  Comment: BUE exercises facilitated within cervical percautions to maintain/iincrease overall strength/endurance/tolerance needed to participate in ADLs, IADLs, and functional mobility/transfers independently. Exercises  Scapular Protraction: x  Scapular Retraction: x  Shoulder Flexion: x within cervical percautions  Shoulder Extension: x within cervical percautions  Shoulder ABduction: x  Shoulder ADduction: x  Horizontal ABduction: x  Horizontal ADduction: x  Elbow Flexion: x  Elbow Extension: x  Grasp/Release: Red digi flex completed each hand 15x each, ultra  2nd setting.  Tasks utilized to improve  strength and FM strength for functional tasks  Other: Red thera flex for  15 reps (up/down/twisting) to increase hand stregnth needed for functional tasks. BUE tremors noted due to increased weakness with 2 losses of . G tolerance noted              Light Housekeeping  Light Housekeeping Level:  (without device)  Light Housekeeping Level of Assistance: Stand by assistance (Close SBA )  Light Housekeeping: Pt completed light housekeeping tasks organizing bathroom and making bed ie stripping off linens putting fitted sheet, flat sheet pillow cases, blankets on bed without device. Pt utilized bed for 0-1 UE support PRN during task. Bed elevevated and reacher to pickup items to adhere to cervical percautions. Pt demo'd G safety awareness with no LOB noted         ADL  Feeding: Independent  Grooming: Modified independent  (SBA while standing sinkside; for oral care)  UE Bathing: Stand by assistance;Setup (Seated tub transfer; did not complete hair washing this date)  LE Bathing: Setup  UE Dressing: Setup  LE Dressing: Stand by assistance;Setup (SBA for standing for pulling underwear/pants over hips)  Toileting: None (did not complete/request this date )  Additional Comments: C-collar remained on for full shower this date per cervical precautions      Light Housekeeping  Light Housekeeping Level:  (without device)  Light Housekeeping Level of Assistance: Stand by assistance (Close SBA )  Light Housekeeping: Pt completed light housekeeping tasks organizing bathroom and making bed ie stripping off linens putting fitted sheet, flat sheet pillow cases, blankets on bed without device. Pt utilized bed for 0-1 UE support PRN during task. Bed elevevated and reacher to pickup items to adhere to cervical percautions. Pt demo'chioma GALLEGO safety awareness with no LOB noted     Assessment  Performance deficits / Impairments: Decreased ADL status; Decreased functional mobility ; Decreased strength;Decreased endurance;Decreased balance;Decreased high-level IADLs  Prognosis: Good  Discharge Recommendations: Patient would benefit from continued therapy after discharge;Home with assist PRN  Activity Tolerance: Patient Tolerated treatment well;Patient limited by pain  Safety Devices in place: Yes  Type of devices: All fall risk precautions in place;Call light within reach;Gait belt;Patient at risk for falls; Left in bed  Restraints  Initially in place: No            Patient Goals   Patient goals : To be independent with activities of daily living. Learner:family and patient  Method: demonstration and explanation       Outcome: demonstrated understanding        Plan  Plan  Times per week: 5-7  Times per day: Twice a day  Current Treatment Recommendations: Self-Care / ADL,Strengthening,Balance Training,Functional Mobility Training,Endurance [employer] Management,Safety Education & Training,Patient/Caregiver Education & Training,Equipment Evaluation, Education, & procurement,Home Management Training  Patient Goals   Patient goals : To be independent with activities of daily living. Short term goals  Time Frame for Short term goals: By 1 week  Short term goal 1: Pt will complete upper body bathing/dressing with min A and good safety while maintaining cervial precautions  Short term goal 2: Pt will complete lower body dressing/bathing with SBA and good safety   Short term goal 3: Pt will complete functional transfers/mobility during self care tasks with SBA and good safety  Short term goal 4: Pt will tolerate standing 5+ minutes during functional activity of choice and good safety  Short term goal 5: Pt/family with demonstrate good understanding of cervical precautions during activities of daily living  Short term goal 6: Pt will participate in 30+ minutes of therapeutic exercises/functional activities to increase safety and independence with self care and mobility  Short term goal 7: Pt will demonstrate 3 non-pharmaceutical intervention strategies to reduce pain and increase participation in activities of daily living.    Long term goals  Time Frame for Long term goals : By discharge  Long term goal 1: Pt will complete BADLs with modified independence and good safety  Long term goal 2: Pt will complete functional transfer/mobility during self care tasks with modified independence with good safety with use of least restrictive device  Long term goal 3: Pt will tolerate standing 10+ minutes during functional activity of choice with good safety  Long term goal 4: Pt will complete tub transfer with supervision and good safety  Long term goal 5: Pt will complete simple meal pre/light house keeping task with supervision and good safety  Long term goals 6: Pt will complete floor transfer with supervision and good safety while maintaining cervial precations to participate in  activites  Long term goal 7: Pt/family will demonstrate good understanding of cevical precautions and lifting restrictions in regards to  to reduce risk of further injury during  tasks. Long term goal 8: Pt will demonstrate increased [address] [place] and  strength during self care tasks as evident by increased strength of 5# and increased 9 hole peg test by 5 seconds.          01/24/22 1115 01/24/22 1300   OT Individual Minutes   Time In 1115 1300   Time Out 1209 1336   Minutes 54 36     Electronically signed by THOMAS Manjarrez on 1/24/22 at 3:41 PM EST

## 2022-01-25 LAB
ALBUMIN SERPL-MCNC: 3.6 G/DL (ref 3.5–5.2)
ALBUMIN/GLOBULIN RATIO: ABNORMAL (ref 1–2.5)
ALP BLD-CCNC: 50 U/L (ref 35–104)
ALT SERPL-CCNC: 175 U/L (ref 5–33)
AST SERPL-CCNC: 70 U/L
BILIRUB SERPL-MCNC: <0.15 MG/DL (ref 0.3–1.2)
BILIRUBIN DIRECT: <0.08 MG/DL
BILIRUBIN, INDIRECT: ABNORMAL MG/DL (ref 0–1)
GLOBULIN: ABNORMAL G/DL (ref 1.5–3.8)
TOTAL PROTEIN: 6 G/DL (ref 6.4–8.3)

## 2022-01-25 PROCEDURE — 36415 COLL VENOUS BLD VENIPUNCTURE: CPT

## 2022-01-25 PROCEDURE — 99232 SBSQ HOSP IP/OBS MODERATE 35: CPT | Performed by: STUDENT IN AN ORGANIZED HEALTH CARE EDUCATION/TRAINING PROGRAM

## 2022-01-25 PROCEDURE — 97530 THERAPEUTIC ACTIVITIES: CPT

## 2022-01-25 PROCEDURE — 97110 THERAPEUTIC EXERCISES: CPT

## 2022-01-25 PROCEDURE — 1180000000 HC REHAB R&B

## 2022-01-25 PROCEDURE — 80076 HEPATIC FUNCTION PANEL: CPT

## 2022-01-25 PROCEDURE — 6360000002 HC RX W HCPCS: Performed by: NURSE PRACTITIONER

## 2022-01-25 PROCEDURE — 6370000000 HC RX 637 (ALT 250 FOR IP): Performed by: STUDENT IN AN ORGANIZED HEALTH CARE EDUCATION/TRAINING PROGRAM

## 2022-01-25 PROCEDURE — 6370000000 HC RX 637 (ALT 250 FOR IP): Performed by: PHYSICAL MEDICINE & REHABILITATION

## 2022-01-25 PROCEDURE — 97116 GAIT TRAINING THERAPY: CPT

## 2022-01-25 PROCEDURE — 97535 SELF CARE MNGMENT TRAINING: CPT

## 2022-01-25 PROCEDURE — 99231 SBSQ HOSP IP/OBS SF/LOW 25: CPT | Performed by: INTERNAL MEDICINE

## 2022-01-25 RX ORDER — OXYCODONE HYDROCHLORIDE 5 MG/1
5 TABLET ORAL EVERY 6 HOURS PRN
Status: DISCONTINUED | OUTPATIENT
Start: 2022-01-25 | End: 2022-01-26 | Stop reason: HOSPADM

## 2022-01-25 RX ORDER — GABAPENTIN 300 MG/1
600 CAPSULE ORAL 3 TIMES DAILY
Status: DISCONTINUED | OUTPATIENT
Start: 2022-01-25 | End: 2022-01-26 | Stop reason: HOSPADM

## 2022-01-25 RX ADMIN — GABAPENTIN 600 MG: 300 CAPSULE ORAL at 20:31

## 2022-01-25 RX ADMIN — OXYCODONE HYDROCHLORIDE 5 MG: 5 TABLET ORAL at 20:31

## 2022-01-25 RX ADMIN — OXYCODONE HYDROCHLORIDE 10 MG: 10 TABLET ORAL at 08:28

## 2022-01-25 RX ADMIN — ENOXAPARIN SODIUM 30 MG: 100 INJECTION SUBCUTANEOUS at 20:31

## 2022-01-25 RX ADMIN — OXYCODONE HYDROCHLORIDE 10 MG: 10 TABLET ORAL at 03:04

## 2022-01-25 RX ADMIN — GABAPENTIN 400 MG: 400 CAPSULE ORAL at 14:37

## 2022-01-25 RX ADMIN — ENOXAPARIN SODIUM 30 MG: 100 INJECTION SUBCUTANEOUS at 08:29

## 2022-01-25 RX ADMIN — GABAPENTIN 400 MG: 400 CAPSULE ORAL at 08:28

## 2022-01-25 RX ADMIN — AMITRIPTYLINE HYDROCHLORIDE 25 MG: 25 TABLET, FILM COATED ORAL at 20:31

## 2022-01-25 RX ADMIN — OXYCODONE HYDROCHLORIDE 10 MG: 10 TABLET ORAL at 14:36

## 2022-01-25 ASSESSMENT — PAIN SCALES - GENERAL
PAINLEVEL_OUTOF10: 9
PAINLEVEL_OUTOF10: 8
PAINLEVEL_OUTOF10: 5
PAINLEVEL_OUTOF10: 5
PAINLEVEL_OUTOF10: 7
PAINLEVEL_OUTOF10: 8
PAINLEVEL_OUTOF10: 8
PAINLEVEL_OUTOF10: 5
PAINLEVEL_OUTOF10: 9
PAINLEVEL_OUTOF10: 8
PAINLEVEL_OUTOF10: 8

## 2022-01-25 ASSESSMENT — PAIN DESCRIPTION - LOCATION
LOCATION: NECK
LOCATION: HEAD;NECK;SHOULDER

## 2022-01-25 ASSESSMENT — PAIN - FUNCTIONAL ASSESSMENT: PAIN_FUNCTIONAL_ASSESSMENT: ACTIVITIES ARE NOT PREVENTED

## 2022-01-25 ASSESSMENT — PAIN DESCRIPTION - PAIN TYPE
TYPE: ACUTE PAIN

## 2022-01-25 ASSESSMENT — PAIN DESCRIPTION - DESCRIPTORS: DESCRIPTORS: ACHING;RADIATING;SHARP

## 2022-01-25 ASSESSMENT — PAIN DESCRIPTION - ORIENTATION: ORIENTATION: POSTERIOR;UPPER

## 2022-01-25 NOTE — PROGRESS NOTES
Manuel'd fax from [facility] with denial for continued stay for [facility] beginning 1/25/22. Called Sina to schedule P2P. Spoke with Christiano Sierra @ [facility] who states Dr Alma Thompson will call Dr Ben Ervin, Jan 26th, from 11am-12noon or Thursday, Jan 27th, from 10am-11am for P2P discussion.
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
## 2022-01-25 NOTE — PROGRESS NOTES
Physical Medicine & Rehabilitation  Progress Note      Subjective:      Milena Linn is a 19 y.o. female with mild TBI and C1-C2 fractures. She reports doing okay today. She notes continued pain at the posterior scalp - discussed weaning oxycodone dose to 5mg and increasing gabapentin. Liver enzymes improving. Discussed changing discharge date from Thursday to tomorrow due to insurance denial of continued stay ([id number] to be done tomorrow or Thursday), and she is agreeable. She denies any other acute concerns. ROS:  Denies fevers, chills, sweats. No chest pain, palpitations, lightheadedness. Denies coughing, wheezing or shortness of breath. Denies abdominal pain, nausea, diarrhea or constipation. No new areas of joint pain. Denies new areas of numbness or weakness. Denies new anxiety or depression issues. No new skin problems. Rehabilitation:   Progressing in therapies. PT:  Restrictions/Precautions: General Precautions  Implants present? :  (pt denies)  Other position/activity restrictions: Cervical collar on at all times   Required Braces or Orthoses  Cervical: c-collar (Cervical collar on at all times )   Transfers  Sit to Stand: Supervision  Stand to sit: Supervision  Bed to Chair: Supervision  Stand Pivot Transfers: Supervision  Comment: with, without large base quad cane today. Ambulation 1  Surface: level tile  Device: No Device  Other Apparatus:  (C-collar)  Assistance: Stand by assistance (Can progress to Supervision)  Quality of Gait: slow & guarded, but appeared more at ease than with cane. Denied need to sit during walk back to room. Gait Deviations: Slow Anne-Marie,Decreased step height  Distance: 200' AM + short distances (<40') within gym  Comments: pt demonstrated safe amb    Transfers  Sit to Stand: Supervision  Stand to sit: Supervision  Bed to Chair: Supervision  Stand Pivot Transfers: Supervision  Comment: with, without large base quad cane today. Ambulation  Ambulation?: Yes  More Ambulation?: Yes  Ambulation 1  Surface: level tile  Device: No Device  Other Apparatus:  (C-collar)  Assistance: Stand by assistance (Can progress to Supervision)  Quality of Gait: slow & guarded, but appeared more at ease than with cane. Denied need to sit during walk back to room. Gait Deviations: Slow Anne-Marie,Decreased step height  Distance: 200' AM + short distances (<40') within gym  Comments: pt demonstrated safe amb    Surface: level tile  Ambulation 1  Surface: level tile  Device: No Device  Other Apparatus:  (C-collar)  Assistance: Stand by assistance (Can progress to Supervision)  Quality of Gait: slow & guarded, but appeared more at ease than with cane. Denied need to sit during walk back to room. Gait Deviations: Slow Anne-Marie,Decreased step height  Distance: 200' AM + short distances (<40') within gym  Comments: pt demonstrated safe amb    OT:  ADL  Equipment Provided: Long-handled sponge,Reacher (for showering task for cervical percaution maintenance)  Feeding: Independent  Grooming: Modified independent ,Setup,Increased time to complete (SBA/SUP for standing at sink for face care and oral hygine )  UE Bathing: Increased time to complete,Setup,Minimal assistance (Seated tub bench; A req for washing hair)  LE Bathing: Setup (SBA for standing during kenneth care with )  UE Dressing: Setup  LE Dressing: Setup  Toileting: Supervision (seated weight shift for kenneth care)  Additional Comments: C-collar remained on for full shower this date per cervical precautions.  C-collar padding changed after shower pt supine in bed for completion    Instrumental ADL's  Instrumental ADLs: Yes     Balance  Sitting Balance: Modified independent   Standing Balance: Supervision   Standing Balance  Time: Am: <1 min x3, 1-2 min x1, 6-7 mins; PM: 4-5 mins x2  Activity: functional mobilty/transfers, self care tasks; PM: functional mobiltiy and functional transfers  Comment: G tolerance noted with no LOB. Shower GBs, sink utilized for UE support PRN. Functional Mobility  Functional - Mobility Device: No device  Activity: To/from bathroom,To/From therapy gym  Assist Level: Stand by assistance (SBA progressing to supervision )  Functional Mobility Comments: G safety awarness and no LOB noted this date      Bed mobility  Rolling to Left: Contact guard assistance  Rolling to Right: Stand by assistance  Supine to Sit: Modified independent  Sit to Supine: Modified independent  Scooting: Modified independent  Comment: HOB elevated   Transfers  Stand Pivot Transfers: Contact guard assistance  Sit to stand: Supervision  Stand to sit: Supervision  Transfer Comments: Rishi GALLEGO safety this    Toilet Transfers  Toilet - Technique: Ambulating  Equipment Used: Grab bars  Toilet Transfer: Supervision  Toilet Transfers Comments: pt demonstrates good safety this date. Tub Transfers  Tub - Transfer From: Other (no device)  Tub - Transfer Type: To and From  Tub - Transfer To: Standing  Tub - Technique: Ambulating  Tub Transfers: Stand by assistance  Tub Transfers Comments: Tub transfers trials (5/5) faciliated this date to increase safety and independence needed for shower tasks. Pt was provided verbal instruction and demonstration on standing shower transfer with pt able to redemo with 100% accuracy SBA. Pt able to clear BLEs with minimum diffulty. No LOB noted  Shower Transfers  Shower - Transfer From: [employer] - Transfer Type: To and From  Shower - Transfer To:  Transfer tub bench  Shower - Technique: Ambulating  Shower Transfers: Stand by assistance  Shower Transfers Comments: SBA for over shower threshold, GB utilized PRN for balance maintenance       SPEECH:      Current Medications:   Current Facility-Administered Medications: oxyCODONE (ROXICODONE) immediate release tablet 5 mg, 5 mg, Oral, Q6H PRN **OR** [DISCONTINUED] oxyCODONE HCl (OXY-IR) immediate release tablet 10 mg, 10 mg, Oral, Q6H PRN  gabapentin (NEURONTIN) capsule 600 mg, 600 mg, Oral, TID  amitriptyline (ELAVIL) tablet 25 mg, 25 mg, Oral, Nightly  enoxaparin (LOVENOX) injection 30 mg, 30 mg, SubCUTAneous, BID  polyethylene glycol (GLYCOLAX) packet 17 g, 17 g, Oral, Daily  senna (SENOKOT) tablet 17.2 mg, 2 tablet, Oral, Daily PRN  bisacodyl (DULCOLAX) suppository 10 mg, 10 mg, Rectal, Daily PRN      Objective:  /68   Pulse 99   Temp 97.7 °F (36.5 °C) (Oral)   Resp 18   Ht 5' 3\" (1.6 m)   Wt 226 lb (102.5 kg)   LMP 12/26/2021   SpO2 97%   BMI 40.03 kg/m²       GEN: Well developed, well nourished, no acute distress  HEENT: NCAT. EOM grossly intact. Hearing grossly intact. Mucous membranes pink and moist.  Cervical collar in place. RESP: Normal breath sounds with no wheezing, rales, or rhonchi. Respirations WNL and unlabored. CV: Regular rate and rhythm. No murmurs, rubs, or gallops. ABD: Soft, non-distended, BS+ and equal.  NEURO: Alert. Speech fluent. Sensation to light touch intact. MSK:  Muscle tone and bulk are normal bilaterally. Strength 5/5 in bilateral upper limbs. Strength 5/5 with bilateral ankle dorsiflexion and plantarflexion. LIMBS: No edema in bilateral lower limbs. SKIN: Warm and dry with good turgor. PSYCH: Mood WNL. Affect WNL. Appropriately interactive. Diagnostics:     CBC: No results for input(s): WBC, RBC, HGB, HCT, MCV, RDW, PLT in the last 72 hours. BMP:   No results for input(s): NA, K, CL, CO2, PHOS, BUN, CREATININE, CA, GLUCOSE in the last 72 hours. BNP: No results for input(s): BNP in the last 72 hours. PT/INR: No results for input(s): PROTIME, INR in the last 72 hours. APTT: No results for input(s): APTT in the last 72 hours. CARDIAC ENZYMES: No results for input(s): CKMB, CKMBINDEX, TROPONINT in the last 72 hours.     Invalid input(s): CKTOTAL;3  FASTING LIPID PANEL:No results found for: CHOL, HDL, TRIG  LIVER PROFILE:   Recent Labs     01/25/22  0757   AST 70*   *   BILIDIR <0.08

## 2022-01-25 NOTE — PLAN OF CARE
Problem: Falls - Risk of:  Goal: Will remain free from falls  Description: Will remain free from falls  Outcome: Ongoing  Goal: Absence of physical injury  Description: Absence of physical injury  Outcome: Ongoing     Problem: Skin Integrity:  Goal: Will show no infection signs and symptoms  Description: Will show no infection signs and symptoms  Outcome: Ongoing  Goal: Absence of new skin breakdown  Description: Absence of new skin breakdown  Outcome: Ongoing     Problem: Pain:  Goal: Pain level will decrease  Description: Pain level will decrease  Outcome: Ongoing  Goal: Control of acute pain  Description: Control of acute pain  Outcome: Ongoing  Goal: Control of chronic pain  Description: Control of chronic pain  Outcome: Ongoing     Problem: Musculor/Skeletal Functional Status  Goal: Highest potential functional level  Outcome: Ongoing  Goal: Absence of falls  Outcome: Ongoing     Problem: Nutrition  Goal: Optimal nutrition therapy  1/25/2022 0153 by Kishan Holden RN  Outcome: Ongoing  1/24/2022 1629 by Cushing A Clunk, RD, LD  Outcome: Ongoing

## 2022-01-26 VITALS
WEIGHT: 226 LBS | DIASTOLIC BLOOD PRESSURE: 86 MMHG | HEART RATE: 96 BPM | HEIGHT: 63 IN | TEMPERATURE: 98 F | OXYGEN SATURATION: 96 % | SYSTOLIC BLOOD PRESSURE: 129 MMHG | RESPIRATION RATE: 19 BRPM | BODY MASS INDEX: 40.04 KG/M2

## 2022-01-26 LAB
ANION GAP SERPL CALCULATED.3IONS-SCNC: 11 MMOL/L (ref 9–17)
BUN BLDV-MCNC: 11 MG/DL (ref 6–20)
BUN/CREAT BLD: NORMAL (ref 9–20)
CALCIUM SERPL-MCNC: 9.2 MG/DL (ref 8.6–10.4)
CHLORIDE BLD-SCNC: 103 MMOL/L (ref 98–107)
CO2: 24 MMOL/L (ref 20–31)
CREAT SERPL-MCNC: 0.63 MG/DL (ref 0.5–0.9)
GFR AFRICAN AMERICAN: >60 ML/MIN
GFR NON-AFRICAN AMERICAN: >60 ML/MIN
GFR SERPL CREATININE-BSD FRML MDRD: NORMAL ML/MIN/{1.73_M2}
GFR SERPL CREATININE-BSD FRML MDRD: NORMAL ML/MIN/{1.73_M2}
GLUCOSE BLD-MCNC: 93 MG/DL (ref 70–99)
HCT VFR BLD CALC: 39.9 % (ref 36–46)
HEMOGLOBIN: 13.6 G/DL (ref 12–16)
MCH RBC QN AUTO: 31.1 PG (ref 26–34)
MCHC RBC AUTO-ENTMCNC: 34.2 G/DL (ref 31–37)
MCV RBC AUTO: 90.9 FL (ref 80–100)
NRBC AUTOMATED: NORMAL PER 100 WBC
PDW BLD-RTO: 13.6 % (ref 11.5–14.9)
PLATELET # BLD: 253 K/UL (ref 150–450)
PMV BLD AUTO: 8.5 FL (ref 6–12)
POTASSIUM SERPL-SCNC: 4.5 MMOL/L (ref 3.7–5.3)
RBC # BLD: 4.38 M/UL (ref 4–5.2)
SODIUM BLD-SCNC: 138 MMOL/L (ref 135–144)
WBC # BLD: 5.3 K/UL (ref 3.5–11)

## 2022-01-26 PROCEDURE — 85027 COMPLETE CBC AUTOMATED: CPT

## 2022-01-26 PROCEDURE — 97530 THERAPEUTIC ACTIVITIES: CPT

## 2022-01-26 PROCEDURE — 99238 HOSP IP/OBS DSCHRG MGMT 30/<: CPT | Performed by: STUDENT IN AN ORGANIZED HEALTH CARE EDUCATION/TRAINING PROGRAM

## 2022-01-26 PROCEDURE — 97116 GAIT TRAINING THERAPY: CPT

## 2022-01-26 PROCEDURE — 6370000000 HC RX 637 (ALT 250 FOR IP): Performed by: STUDENT IN AN ORGANIZED HEALTH CARE EDUCATION/TRAINING PROGRAM

## 2022-01-26 PROCEDURE — 36415 COLL VENOUS BLD VENIPUNCTURE: CPT

## 2022-01-26 PROCEDURE — 6370000000 HC RX 637 (ALT 250 FOR IP): Performed by: PHYSICAL MEDICINE & REHABILITATION

## 2022-01-26 PROCEDURE — 97535 SELF CARE MNGMENT TRAINING: CPT

## 2022-01-26 PROCEDURE — 6360000002 HC RX W HCPCS: Performed by: NURSE PRACTITIONER

## 2022-01-26 PROCEDURE — 97110 THERAPEUTIC EXERCISES: CPT

## 2022-01-26 PROCEDURE — 80048 BASIC METABOLIC PNL TOTAL CA: CPT

## 2022-01-26 RX ORDER — OXYCODONE HYDROCHLORIDE 5 MG/1
5 TABLET ORAL EVERY 6 HOURS PRN
Qty: 12 TABLET | Refills: 0 | Status: SHIPPED | OUTPATIENT
Start: 2022-01-26 | End: 2022-01-29

## 2022-01-26 RX ORDER — GABAPENTIN 300 MG/1
600 CAPSULE ORAL 3 TIMES DAILY
Qty: 180 CAPSULE | Refills: 0 | Status: SHIPPED | OUTPATIENT
Start: 2022-01-26 | End: 2022-03-07

## 2022-01-26 RX ORDER — AMITRIPTYLINE HYDROCHLORIDE 25 MG/1
25 TABLET, FILM COATED ORAL NIGHTLY
Qty: 30 TABLET | Refills: 0 | Status: SHIPPED | OUTPATIENT
Start: 2022-01-26

## 2022-01-26 RX ORDER — POLYETHYLENE GLYCOL 3350 17 G/17G
17 POWDER, FOR SOLUTION ORAL DAILY PRN
COMMUNITY
Start: 2022-01-26

## 2022-01-26 RX ADMIN — GABAPENTIN 600 MG: 300 CAPSULE ORAL at 14:35

## 2022-01-26 RX ADMIN — ENOXAPARIN SODIUM 30 MG: 100 INJECTION SUBCUTANEOUS at 08:36

## 2022-01-26 RX ADMIN — OXYCODONE HYDROCHLORIDE 5 MG: 5 TABLET ORAL at 02:47

## 2022-01-26 RX ADMIN — GABAPENTIN 600 MG: 300 CAPSULE ORAL at 08:36

## 2022-01-26 RX ADMIN — OXYCODONE HYDROCHLORIDE 5 MG: 5 TABLET ORAL at 14:35

## 2022-01-26 RX ADMIN — POLYETHYLENE GLYCOL 3350 17 G: 17 POWDER, FOR SOLUTION ORAL at 08:36

## 2022-01-26 RX ADMIN — OXYCODONE HYDROCHLORIDE 5 MG: 5 TABLET ORAL at 08:36

## 2022-01-26 ASSESSMENT — PAIN SCALES - GENERAL
PAINLEVEL_OUTOF10: 8
PAINLEVEL_OUTOF10: 9
PAINLEVEL_OUTOF10: 8
PAINLEVEL_OUTOF10: 8
PAINLEVEL_OUTOF10: 7

## 2022-01-26 ASSESSMENT — PAIN DESCRIPTION - PAIN TYPE
TYPE: ACUTE PAIN
TYPE: ACUTE PAIN

## 2022-01-26 ASSESSMENT — 9 HOLE PEG TEST
TESTTIME_SECONDS: 21.27
TESTTIME_SECONDS: 26.25
TEST_RESULT: FUNCTIONAL
TEST_RESULT: IMPAIRED

## 2022-01-26 ASSESSMENT — PAIN DESCRIPTION - LOCATION
LOCATION: HEAD
LOCATION: NECK
LOCATION: HEAD;NECK;JAW

## 2022-01-26 ASSESSMENT — PAIN DESCRIPTION - DESCRIPTORS: DESCRIPTORS: SORE;RADIATING

## 2022-01-26 NOTE — PROGRESS NOTES
CLINICAL PHARMACY NOTE: MEDS TO BEDS    Total # of Prescriptions Filled: 3   The following medications were delivered to the patient:  · Oxycodone  · Gabapentin  · amitriptyline    Additional Documentation:  Kadie lam
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
## 2022-01-26 NOTE — PLAN OF CARE
Problem: Falls - Risk of:  Goal: Will remain free from falls  Description: Will remain free from falls  1/26/2022 1121 by Nicolas Lorenzo RN  Outcome: Completed     Problem: Falls - Risk of:  Goal: Absence of physical injury  Description: Absence of physical injury  Outcome: Completed     Problem: Skin Integrity:  Goal: Will show no infection signs and symptoms  Description: Will show no infection signs and symptoms  1/26/2022 1121 by Nicolas Lorenzo RN  Outcome: Completed     Problem: Skin Integrity:  Goal: Absence of new skin breakdown  Description: Absence of new skin breakdown  Outcome: Completed     Problem: Pain:  Goal: Pain level will decrease  Description: Pain level will decrease  1/26/2022 1121 by Nicolas Lorenzo RN  Outcome: Completed     Problem: Pain:  Goal: Control of acute pain  Description: Control of acute pain  1/26/2022 1121 by Nicolas Lorenzo RN  Outcome: Completed     Problem: Nutrition  Goal: Optimal nutrition therapy  1/26/2022 1121 by Nicolas Lorenzo RN  Outcome: Completed  1/26/2022 0211 by Angel Johnson RN  Outcome: Met This Shift
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
## 2022-01-26 NOTE — PLAN OF CARE
Problem: Falls - Risk of:  Goal: Will remain free from falls  Description: Will remain free from falls  1/26/2022 0211 by Noelle Rothman RN  Outcome: Met This Shift  1/25/2022 1557 by Cesar Clement RN  Outcome: Ongoing     Problem: Skin Integrity:  Goal: Will show no infection signs and symptoms  Description: Will show no infection signs and symptoms  1/26/2022 0211 by Noelle Rothman RN  Outcome: Met This Shift  1/25/2022 1557 by Cesar Clement RN  Outcome: Ongoing     Problem: Pain:  Goal: Pain level will decrease  Description: Pain level will decrease  1/26/2022 0211 by Noelle Rothman RN  Outcome: Met This Shift  1/25/2022 1557 by Cesar Clement RN  Outcome: Ongoing  Goal: Control of acute pain  Description: Control of acute pain  Outcome: Ongoing     Problem: Musculor/Skeletal Functional Status  Goal: Highest potential functional level  Outcome: Met This Shift     Problem: Nutrition  Goal: Optimal nutrition therapy  Outcome: Met This Shift
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
## 2022-01-27 ENCOUNTER — OFFICE VISIT (OUTPATIENT)
Dept: INTERNAL MEDICINE CLINIC | Age: 22
End: 2022-01-27
Payer: MEDICAID

## 2022-01-27 VITALS
HEIGHT: 63 IN | DIASTOLIC BLOOD PRESSURE: 74 MMHG | WEIGHT: 213 LBS | OXYGEN SATURATION: 98 % | SYSTOLIC BLOOD PRESSURE: 122 MMHG | BODY MASS INDEX: 37.74 KG/M2 | HEART RATE: 114 BPM

## 2022-01-27 DIAGNOSIS — S06.9X0D MILD TRAUMATIC BRAIN INJURY, WITHOUT LOSS OF CONSCIOUSNESS, SUBSEQUENT ENCOUNTER: ICD-10-CM

## 2022-01-27 DIAGNOSIS — R74.8 ELEVATED LIVER ENZYMES: ICD-10-CM

## 2022-01-27 DIAGNOSIS — S12.100A TRAUMATIC CLOSED FRACTURE OF C2 VERTEBRA WITH MINIMAL DISPLACEMENT, INITIAL ENCOUNTER (HCC): ICD-10-CM

## 2022-01-27 DIAGNOSIS — Z76.89 ENCOUNTER TO ESTABLISH CARE: Primary | ICD-10-CM

## 2022-01-27 PROCEDURE — G8417 CALC BMI ABV UP PARAM F/U: HCPCS | Performed by: INTERNAL MEDICINE

## 2022-01-27 PROCEDURE — 99213 OFFICE O/P EST LOW 20 MIN: CPT | Performed by: INTERNAL MEDICINE

## 2022-01-27 PROCEDURE — G8484 FLU IMMUNIZE NO ADMIN: HCPCS | Performed by: INTERNAL MEDICINE

## 2022-01-27 PROCEDURE — G8427 DOCREV CUR MEDS BY ELIG CLIN: HCPCS | Performed by: INTERNAL MEDICINE

## 2022-01-27 PROCEDURE — 1111F DSCHRG MED/CURRENT MED MERGE: CPT | Performed by: INTERNAL MEDICINE

## 2022-01-27 PROCEDURE — 1036F TOBACCO NON-USER: CPT | Performed by: INTERNAL MEDICINE

## 2022-01-27 ASSESSMENT — PATIENT HEALTH QUESTIONNAIRE - PHQ9
SUM OF ALL RESPONSES TO PHQ QUESTIONS 1-9: 0
1. LITTLE INTEREST OR PLEASURE IN DOING THINGS: 0
SUM OF ALL RESPONSES TO PHQ QUESTIONS 1-9: 0
2. FEELING DOWN, DEPRESSED OR HOPELESS: 0
SUM OF ALL RESPONSES TO PHQ QUESTIONS 1-9: 0
SUM OF ALL RESPONSES TO PHQ QUESTIONS 1-9: 0
SUM OF ALL RESPONSES TO PHQ9 QUESTIONS 1 & 2: 0
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
## 2022-02-10 ENCOUNTER — OFFICE VISIT (OUTPATIENT)
Dept: NEUROSURGERY | Age: 22
End: 2022-02-10
Payer: MEDICAID

## 2022-02-10 VITALS
WEIGHT: 213 LBS | HEIGHT: 63 IN | SYSTOLIC BLOOD PRESSURE: 110 MMHG | DIASTOLIC BLOOD PRESSURE: 77 MMHG | BODY MASS INDEX: 37.74 KG/M2 | HEART RATE: 105 BPM | OXYGEN SATURATION: 97 %

## 2022-02-10 DIAGNOSIS — M25.511 ACUTE PAIN OF RIGHT SHOULDER: ICD-10-CM
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
## 2022-02-10 DIAGNOSIS — S12.100A TRAUMATIC CLOSED FRACTURE OF C2 VERTEBRA WITH MINIMAL DISPLACEMENT, INITIAL ENCOUNTER (HCC): Primary | ICD-10-CM

## 2022-02-10 PROCEDURE — 1111F DSCHRG MED/CURRENT MED MERGE: CPT | Performed by: NURSE PRACTITIONER

## 2022-02-10 PROCEDURE — G8427 DOCREV CUR MEDS BY ELIG CLIN: HCPCS | Performed by: NURSE PRACTITIONER

## 2022-02-10 PROCEDURE — 1036F TOBACCO NON-USER: CPT | Performed by: NURSE PRACTITIONER

## 2022-02-10 PROCEDURE — G8417 CALC BMI ABV UP PARAM F/U: HCPCS | Performed by: NURSE PRACTITIONER

## 2022-02-10 PROCEDURE — 99214 OFFICE O/P EST MOD 30 MIN: CPT | Performed by: NURSE PRACTITIONER

## 2022-02-10 PROCEDURE — G8484 FLU IMMUNIZE NO ADMIN: HCPCS | Performed by: NURSE PRACTITIONER
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
## 2022-02-19 ENCOUNTER — HOSPITAL ENCOUNTER (OUTPATIENT)
Dept: GENERAL RADIOLOGY | Age: 22
Discharge: HOME | End: 2022-02-21
Payer: MEDICAID

## 2022-02-19 ENCOUNTER — HOSPITAL ENCOUNTER (OUTPATIENT)
Dept: MRI IMAGING | Age: 22
Discharge: HOME | End: 2022-02-21
Payer: MEDICAID
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
## 2022-02-19 DIAGNOSIS — S12.100A TRAUMATIC CLOSED FRACTURE OF C2 VERTEBRA WITH MINIMAL DISPLACEMENT, INITIAL ENCOUNTER (HCC): ICD-10-CM

## 2022-02-19 PROCEDURE — 72040 X-RAY EXAM NECK SPINE 2-3 VW: CPT

## 2022-02-19 PROCEDURE — 72141 MRI NECK SPINE W/O DYE: CPT

## 2022-03-07 ENCOUNTER — HOSPITAL ENCOUNTER (OUTPATIENT)
Age: 22
Discharge: HOME | End: 2022-03-09
Payer: MEDICAID

## 2022-03-07 ENCOUNTER — HOSPITAL ENCOUNTER (OUTPATIENT)
Dept: GENERAL RADIOLOGY | Age: 22
Discharge: HOME | End: 2022-03-09
Payer: MEDICAID

## 2022-03-07 ENCOUNTER — OFFICE VISIT (OUTPATIENT)
Dept: NEUROSURGERY | Age: 22
End: 2022-03-07
Payer: MEDICAID

## 2022-03-07 VITALS
WEIGHT: 210 LBS | BODY MASS INDEX: 37.21 KG/M2 | OXYGEN SATURATION: 95 % | SYSTOLIC BLOOD PRESSURE: 129 MMHG | HEIGHT: 63 IN | DIASTOLIC BLOOD PRESSURE: 85 MMHG | HEART RATE: 81 BPM | TEMPERATURE: 97.5 F
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
## 2022-03-07 DIAGNOSIS — S12.100A TRAUMATIC CLOSED FRACTURE OF C2 VERTEBRA WITH MINIMAL DISPLACEMENT, INITIAL ENCOUNTER (HCC): Primary | ICD-10-CM

## 2022-03-07 DIAGNOSIS — S12.100A TRAUMATIC CLOSED FRACTURE OF C2 VERTEBRA WITH MINIMAL DISPLACEMENT, INITIAL ENCOUNTER (HCC): ICD-10-CM

## 2022-03-07 PROCEDURE — G8417 CALC BMI ABV UP PARAM F/U: HCPCS | Performed by: NURSE PRACTITIONER

## 2022-03-07 PROCEDURE — G8427 DOCREV CUR MEDS BY ELIG CLIN: HCPCS | Performed by: NURSE PRACTITIONER

## 2022-03-07 PROCEDURE — G8484 FLU IMMUNIZE NO ADMIN: HCPCS | Performed by: NURSE PRACTITIONER

## 2022-03-07 PROCEDURE — 99213 OFFICE O/P EST LOW 20 MIN: CPT | Performed by: NURSE PRACTITIONER

## 2022-03-07 PROCEDURE — 1036F TOBACCO NON-USER: CPT | Performed by: NURSE PRACTITIONER

## 2022-03-07 PROCEDURE — 72040 X-RAY EXAM NECK SPINE 2-3 VW: CPT
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
## 2022-03-09 ENCOUNTER — TELEPHONE (OUTPATIENT)
Dept: NEUROSURGERY | Age: 22
End: 2022-03-09

## 2022-03-09 NOTE — TELEPHONE ENCOUNTER
Impression   1. Anterolisthesis at C2-3, C3-4, and C4-5 on flexion, which resolves on   extension.    2. Previously described C2 fracture is not confidently identified on this   examination     Patient called stating she was waiting to hear from you with results

## 2022-03-18 ENCOUNTER — HOSPITAL ENCOUNTER (OUTPATIENT)
Age: 22
Setting detail: SPECIMEN
Discharge: HOME | End: 2022-03-18

## 2022-03-19 LAB
-: ABNORMAL
BACTERIA: ABNORMAL
BILIRUBIN URINE: NEGATIVE
CANDIDA SPECIES, DNA PROBE: POSITIVE
COLOR: YELLOW
CULTURE: ABNORMAL
EPITHELIAL CELLS UA: ABNORMAL /HPF (ref 0–5)
GARDNERELLA VAGINALIS, DNA PROBE: NEGATIVE
GLUCOSE URINE: NEGATIVE
KETONES, URINE: NEGATIVE
LEUKOCYTE ESTERASE, URINE: ABNORMAL
MUCUS: ABNORMAL
NITRITE, URINE: NEGATIVE
PH UA: 5.5 (ref 5–8)
PROTEIN UA: NEGATIVE
RBC UA: ABNORMAL /HPF (ref 0–2)
SOURCE: ABNORMAL
SPECIFIC GRAVITY UA: 1.02 (ref 1–1.03)
SPECIMEN DESCRIPTION: ABNORMAL
TRICHOMONAS VAGINALIS DNA: NEGATIVE
TURBIDITY: ABNORMAL
URINE HGB: ABNORMAL
UROBILINOGEN, URINE: NORMAL
WBC UA: ABNORMAL /HPF (ref 0–5)

## 2022-06-09 ENCOUNTER — HOSPITAL ENCOUNTER (OUTPATIENT)
Age: 22
Setting detail: SPECIMEN
Discharge: HOME | End: 2022-06-09

## 2022-06-10 LAB
-: ABNORMAL
BACTERIA: ABNORMAL
BILIRUBIN URINE: NEGATIVE
CANDIDA SPECIES, DNA PROBE: NEGATIVE
CASTS UA: ABNORMAL /LPF (ref 0–8)
COLOR: YELLOW
EPITHELIAL CELLS UA: ABNORMAL /HPF (ref 0–5)
GARDNERELLA VAGINALIS, DNA PROBE: POSITIVE
GLUCOSE URINE: NEGATIVE
KETONES, URINE: NEGATIVE
LEUKOCYTE ESTERASE, URINE: ABNORMAL
NITRITE, URINE: NEGATIVE
PH UA: 7 (ref 5–8)
PROTEIN UA: NEGATIVE
RBC UA: ABNORMAL /HPF (ref 0–4)
SOURCE: ABNORMAL
SPECIFIC GRAVITY UA: 1.02 (ref 1–1.03)
TRICHOMONAS VAGINALIS DNA: NEGATIVE
TURBIDITY: ABNORMAL
URINE HGB: NEGATIVE
UROBILINOGEN, URINE: NORMAL
WBC UA: ABNORMAL /HPF (ref 0–5)

## 2022-06-12 LAB
CULTURE: ABNORMAL
SPECIMEN DESCRIPTION: ABNORMAL

## 2022-07-13 ENCOUNTER — HOSPITAL ENCOUNTER (OUTPATIENT)
Age: 22
Setting detail: SPECIMEN
Discharge: HOME | End: 2022-07-13

## 2022-07-13 LAB
CANDIDA SPECIES, DNA PROBE: NEGATIVE
GARDNERELLA VAGINALIS, DNA PROBE: POSITIVE
SOURCE: ABNORMAL
TRICHOMONAS VAGINALIS DNA: NEGATIVE

## 2022-08-22 ENCOUNTER — APPOINTMENT (OUTPATIENT)
Dept: GENERAL RADIOLOGY | Age: 22
DRG: 531 | End: 2022-08-22
Payer: MEDICAID

## 2022-08-22 ENCOUNTER — HOSPITAL ENCOUNTER (EMERGENCY)
Age: 22
Discharge: HOME | DRG: 531 | End: 2022-08-22
Attending: EMERGENCY MEDICINE
Payer: MEDICAID

## 2022-08-22 ENCOUNTER — APPOINTMENT (OUTPATIENT)
Dept: CT IMAGING | Age: 22
DRG: 531 | End: 2022-08-22
Payer: MEDICAID

## 2022-08-22 ENCOUNTER — APPOINTMENT (OUTPATIENT)
Dept: ULTRASOUND IMAGING | Age: 22
DRG: 531 | End: 2022-08-22
Payer: MEDICAID

## 2022-08-22 VITALS
DIASTOLIC BLOOD PRESSURE: 69 MMHG | OXYGEN SATURATION: 98 % | HEART RATE: 72 BPM | SYSTOLIC BLOOD PRESSURE: 99 MMHG | TEMPERATURE: 98.7 F | RESPIRATION RATE: 17 BRPM

## 2022-08-22 DIAGNOSIS — M25.551 PAIN IN JOINT INVOLVING RIGHT PELVIC REGION AND THIGH: Primary | ICD-10-CM

## 2022-08-22 LAB
ANION GAP SERPL CALCULATED.3IONS-SCNC: 14 MMOL/L (ref 9–17)
BILIRUBIN URINE: NEGATIVE
BUN BLDV-MCNC: 8 MG/DL (ref 6–20)
CALCIUM SERPL-MCNC: 8.1 MG/DL (ref 8.6–10.4)
CANDIDA SPECIES, DNA PROBE: NEGATIVE
CASTS UA: ABNORMAL /LPF (ref 0–8)
CHLORIDE BLD-SCNC: 104 MMOL/L (ref 98–107)
CO2: 20 MMOL/L (ref 20–31)
COLOR: YELLOW
CREAT SERPL-MCNC: 0.69 MG/DL (ref 0.5–0.9)
D-DIMER QUANTITATIVE: 0.59 MG/L FEU
EPITHELIAL CELLS UA: ABNORMAL /HPF (ref 0–5)
GARDNERELLA VAGINALIS, DNA PROBE: NEGATIVE
GFR AFRICAN AMERICAN: >60 ML/MIN
GFR NON-AFRICAN AMERICAN: >60 ML/MIN
GFR SERPL CREATININE-BSD FRML MDRD: ABNORMAL ML/MIN/{1.73_M2}
GLUCOSE BLD-MCNC: 144 MG/DL (ref 70–99)
GLUCOSE URINE: NEGATIVE
HCG QUANTITATIVE: <1 MIU/ML
HCT VFR BLD CALC: 46.4 % (ref 36.3–47.1)
HEMOGLOBIN: 16.3 G/DL (ref 11.9–15.1)
KETONES, URINE: NEGATIVE
LACTIC ACID, SEPSIS WHOLE BLOOD: 1.2 MMOL/L (ref 0.5–1.9)
LACTIC ACID, SEPSIS WHOLE BLOOD: 2.7 MMOL/L (ref 0.5–1.9)
LEUKOCYTE ESTERASE, URINE: ABNORMAL
MCH RBC QN AUTO: 32.7 PG (ref 25.2–33.5)
MCHC RBC AUTO-ENTMCNC: 35.1 G/DL (ref 28.4–34.8)
MCV RBC AUTO: 93.2 FL (ref 82.6–102.9)
NITRITE, URINE: NEGATIVE
NRBC AUTOMATED: 0 PER 100 WBC
PDW BLD-RTO: 12.9 % (ref 11.8–14.4)
PH UA: 5.5 (ref 5–8)
PLATELET # BLD: 185 K/UL (ref 138–453)
PMV BLD AUTO: 10.4 FL (ref 8.1–13.5)
POTASSIUM SERPL-SCNC: 3.9 MMOL/L (ref 3.7–5.3)
PROTEIN UA: NEGATIVE
RBC # BLD: 4.98 M/UL (ref 3.95–5.11)
RBC UA: ABNORMAL /HPF (ref 0–4)
SODIUM BLD-SCNC: 138 MMOL/L (ref 135–144)
SOURCE: NORMAL
SPECIFIC GRAVITY UA: 1.02 (ref 1–1.03)
TRICHOMONAS VAGINALIS DNA: NEGATIVE
TURBIDITY: CLEAR
URINE HGB: ABNORMAL
UROBILINOGEN, URINE: NORMAL
WBC # BLD: 10 K/UL (ref 3.5–11.3)
WBC UA: ABNORMAL /HPF (ref 0–5)

## 2022-08-22 PROCEDURE — 76830 TRANSVAGINAL US NON-OB: CPT

## 2022-08-22 PROCEDURE — 87491 CHLMYD TRACH DNA AMP PROBE: CPT

## 2022-08-22 PROCEDURE — 80048 BASIC METABOLIC PNL TOTAL CA: CPT

## 2022-08-22 PROCEDURE — 96375 TX/PRO/DX INJ NEW DRUG ADDON: CPT

## 2022-08-22 PROCEDURE — 6360000002 HC RX W HCPCS: Performed by: EMERGENCY MEDICINE

## 2022-08-22 PROCEDURE — 87660 TRICHOMONAS VAGIN DIR PROBE: CPT

## 2022-08-22 PROCEDURE — 99285 EMERGENCY DEPT VISIT HI MDM: CPT

## 2022-08-22 PROCEDURE — 93976 VASCULAR STUDY: CPT

## 2022-08-22 PROCEDURE — 87480 CANDIDA DNA DIR PROBE: CPT

## 2022-08-22 PROCEDURE — 85379 FIBRIN DEGRADATION QUANT: CPT

## 2022-08-22 PROCEDURE — 93970 EXTREMITY STUDY: CPT

## 2022-08-22 PROCEDURE — 6370000000 HC RX 637 (ALT 250 FOR IP): Performed by: STUDENT IN AN ORGANIZED HEALTH CARE EDUCATION/TRAINING PROGRAM

## 2022-08-22 PROCEDURE — 73502 X-RAY EXAM HIP UNI 2-3 VIEWS: CPT

## 2022-08-22 PROCEDURE — 83605 ASSAY OF LACTIC ACID: CPT

## 2022-08-22 PROCEDURE — 87591 N.GONORRHOEAE DNA AMP PROB: CPT

## 2022-08-22 PROCEDURE — 87510 GARDNER VAG DNA DIR PROBE: CPT

## 2022-08-22 PROCEDURE — 85027 COMPLETE CBC AUTOMATED: CPT

## 2022-08-22 PROCEDURE — 99281 EMR DPT VST MAYX REQ PHY/QHP: CPT | Performed by: OBSTETRICS & GYNECOLOGY

## 2022-08-22 PROCEDURE — 81001 URINALYSIS AUTO W/SCOPE: CPT

## 2022-08-22 PROCEDURE — 96374 THER/PROPH/DIAG INJ IV PUSH: CPT

## 2022-08-22 PROCEDURE — 84702 CHORIONIC GONADOTROPIN TEST: CPT

## 2022-08-22 PROCEDURE — 74177 CT ABD & PELVIS W/CONTRAST: CPT

## 2022-08-22 PROCEDURE — 96376 TX/PRO/DX INJ SAME DRUG ADON: CPT

## 2022-08-22 PROCEDURE — 6360000002 HC RX W HCPCS: Performed by: STUDENT IN AN ORGANIZED HEALTH CARE EDUCATION/TRAINING PROGRAM

## 2022-08-22 PROCEDURE — 6360000004 HC RX CONTRAST MEDICATION: Performed by: STUDENT IN AN ORGANIZED HEALTH CARE EDUCATION/TRAINING PROGRAM

## 2022-08-22 RX ORDER — KETOROLAC TROMETHAMINE 30 MG/ML
30 INJECTION, SOLUTION INTRAMUSCULAR; INTRAVENOUS ONCE
Status: COMPLETED | OUTPATIENT
Start: 2022-08-22 | End: 2022-08-22

## 2022-08-22 RX ORDER — CYCLOBENZAPRINE HCL 10 MG
10 TABLET ORAL ONCE
Status: COMPLETED | OUTPATIENT
Start: 2022-08-22 | End: 2022-08-22

## 2022-08-22 RX ORDER — CYCLOBENZAPRINE HCL 10 MG
10 TABLET ORAL NIGHTLY PRN
Qty: 10 TABLET | Refills: 0 | Status: SHIPPED | OUTPATIENT
Start: 2022-08-22 | End: 2022-09-01

## 2022-08-22 RX ORDER — FENTANYL CITRATE 50 UG/ML
50 INJECTION, SOLUTION INTRAMUSCULAR; INTRAVENOUS ONCE
Status: COMPLETED | OUTPATIENT
Start: 2022-08-22 | End: 2022-08-22

## 2022-08-22 RX ORDER — ACETAMINOPHEN 500 MG
1000 TABLET ORAL ONCE
Status: DISCONTINUED | OUTPATIENT
Start: 2022-08-22 | End: 2022-08-23 | Stop reason: HOSPADM

## 2022-08-22 RX ORDER — FENTANYL CITRATE 50 UG/ML
25 INJECTION, SOLUTION INTRAMUSCULAR; INTRAVENOUS ONCE
Status: COMPLETED | OUTPATIENT
Start: 2022-08-22 | End: 2022-08-22

## 2022-08-22 RX ORDER — CYCLOBENZAPRINE HCL 10 MG
10 TABLET ORAL ONCE
Status: DISCONTINUED | OUTPATIENT
Start: 2022-08-22 | End: 2022-08-23 | Stop reason: HOSPADM

## 2022-08-22 RX ORDER — IBUPROFEN 800 MG/1
800 TABLET ORAL ONCE
Status: DISCONTINUED | OUTPATIENT
Start: 2022-08-22 | End: 2022-08-23 | Stop reason: HOSPADM

## 2022-08-22 RX ORDER — LIDOCAINE 4 G/G
1 PATCH TOPICAL DAILY
Status: DISCONTINUED | OUTPATIENT
Start: 2022-08-22 | End: 2022-08-23 | Stop reason: HOSPADM

## 2022-08-22 RX ADMIN — CYCLOBENZAPRINE HYDROCHLORIDE 10 MG: 10 TABLET, FILM COATED ORAL at 22:39

## 2022-08-22 RX ADMIN — FENTANYL CITRATE 50 MCG: 50 INJECTION, SOLUTION INTRAMUSCULAR; INTRAVENOUS at 17:12

## 2022-08-22 RX ADMIN — FENTANYL CITRATE 25 MCG: 50 INJECTION, SOLUTION INTRAMUSCULAR; INTRAVENOUS at 14:15

## 2022-08-22 RX ADMIN — KETOROLAC TROMETHAMINE 30 MG: 30 INJECTION, SOLUTION INTRAMUSCULAR at 22:38

## 2022-08-22 RX ADMIN — IOPAMIDOL 75 ML: 755 INJECTION, SOLUTION INTRAVENOUS at 19:12

## 2022-08-22 RX ADMIN — FENTANYL CITRATE 50 MCG: 50 INJECTION, SOLUTION INTRAMUSCULAR; INTRAVENOUS at 21:12

## 2022-08-22 ASSESSMENT — ENCOUNTER SYMPTOMS
NAUSEA: 0
CHEST TIGHTNESS: 0
DIARRHEA: 0
SHORTNESS OF BREATH: 0
COLOR CHANGE: 0
VOMITING: 0
ABDOMINAL PAIN: 1
COUGH: 0
TROUBLE SWALLOWING: 0
BACK PAIN: 1

## 2022-08-22 ASSESSMENT — PAIN SCALES - GENERAL
PAINLEVEL_OUTOF10: 9
PAINLEVEL_OUTOF10: 7

## 2022-08-22 ASSESSMENT — PAIN - FUNCTIONAL ASSESSMENT: PAIN_FUNCTIONAL_ASSESSMENT: 0-10

## 2022-08-22 NOTE — DISCHARGE INSTRUCTIONS
You have been seen in the ER today for right leg and pelvic pain. Lab tests were normal, your ultrasound did not show anything abnormal that required immediate intervention. Please follow-up with your primary care provider. if you begin to experience any symptoms such as chest pain shortness of breath nausea vomiting dizziness drowsiness abdominal pain loss of consciousness or any other symptoms you find concerning please return to the ED for follow-up evaluation. If you have been given medication please take them as prescribed for the pain. Do not take more medication than recommended at any given time. Please follow-up with your primary care provider within 5 to 7 days for continued care. Yes
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
## 2022-08-23 PROBLEM — M25.551 PAIN IN JOINT INVOLVING RIGHT PELVIC REGION AND THIGH: Status: ACTIVE | Noted: 2022-08-23

## 2022-08-23 LAB
C TRACH DNA GENITAL QL NAA+PROBE: ABNORMAL
N. GONORRHOEAE DNA: NEGATIVE
SPECIMEN DESCRIPTION: ABNORMAL

## 2022-08-23 NOTE — CONSULTS
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 with Dr. Timothy Hernandez, who is agreeable.        Aliya Harris DO  Ob/Gyn Resident   [address]  2022, 9:43 PM

## 2022-08-24 ENCOUNTER — APPOINTMENT (OUTPATIENT)
Dept: GENERAL RADIOLOGY | Age: 22
DRG: 531 | End: 2022-08-24
Payer: MEDICAID

## 2022-08-24 ENCOUNTER — HOSPITAL ENCOUNTER (INPATIENT)
Age: 22
LOS: 1 days | Discharge: HOME | DRG: 531 | End: 2022-08-25
Attending: EMERGENCY MEDICINE | Admitting: OBSTETRICS & GYNECOLOGY
Payer: MEDICAID

## 2022-08-24 DIAGNOSIS — N73.0 PID (ACUTE PELVIC INFLAMMATORY DISEASE): Primary | ICD-10-CM

## 2022-08-24 LAB
ABSOLUTE EOS #: 0.06 K/UL (ref 0–0.44)
ABSOLUTE IMMATURE GRANULOCYTE: 0.04 K/UL (ref 0–0.3)
ABSOLUTE LYMPH #: 2.09 K/UL (ref 1.1–3.7)
ABSOLUTE MONO #: 0.69 K/UL (ref 0.1–1.2)
ALBUMIN SERPL-MCNC: 3.6 G/DL (ref 3.5–5.2)
ALBUMIN/GLOBULIN RATIO: 1.3 (ref 1–2.5)
ALP BLD-CCNC: 68 U/L (ref 35–104)
ALT SERPL-CCNC: 18 U/L (ref 5–33)
ANION GAP SERPL CALCULATED.3IONS-SCNC: 9 MMOL/L (ref 9–17)
AST SERPL-CCNC: 19 U/L
BASOPHILS # BLD: 1 % (ref 0–2)
BASOPHILS ABSOLUTE: 0.05 K/UL (ref 0–0.2)
BILIRUB SERPL-MCNC: 0.55 MG/DL (ref 0.3–1.2)
BUN BLDV-MCNC: 7 MG/DL (ref 6–20)
CALCIUM SERPL-MCNC: 8.8 MG/DL (ref 8.6–10.4)
CHLORIDE BLD-SCNC: 100 MMOL/L (ref 98–107)
CO2: 26 MMOL/L (ref 20–31)
CREAT SERPL-MCNC: 0.81 MG/DL (ref 0.5–0.9)
EOSINOPHILS RELATIVE PERCENT: 1 % (ref 1–4)
GFR AFRICAN AMERICAN: >60 ML/MIN
GFR NON-AFRICAN AMERICAN: >60 ML/MIN
GFR SERPL CREATININE-BSD FRML MDRD: ABNORMAL ML/MIN/{1.73_M2}
GLUCOSE BLD-MCNC: 124 MG/DL (ref 70–99)
HCT VFR BLD CALC: 41.9 % (ref 36.3–47.1)
HEMOGLOBIN: 14.2 G/DL (ref 11.9–15.1)
IMMATURE GRANULOCYTES: 0 %
LACTIC ACID, SEPSIS WHOLE BLOOD: 1.2 MMOL/L (ref 0.5–1.9)
LYMPHOCYTES # BLD: 22 % (ref 24–43)
MCH RBC QN AUTO: 31.8 PG (ref 25.2–33.5)
MCHC RBC AUTO-ENTMCNC: 33.9 G/DL (ref 28.4–34.8)
MCV RBC AUTO: 93.9 FL (ref 82.6–102.9)
MONOCYTES # BLD: 7 % (ref 3–12)
NRBC AUTOMATED: 0 PER 100 WBC
PDW BLD-RTO: 12.7 % (ref 11.8–14.4)
PLATELET # BLD: 163 K/UL (ref 138–453)
PMV BLD AUTO: 10.7 FL (ref 8.1–13.5)
POTASSIUM SERPL-SCNC: 3.9 MMOL/L (ref 3.7–5.3)
RBC # BLD: 4.46 M/UL (ref 3.95–5.11)
SARS-COV-2, RAPID: DETECTED
SEG NEUTROPHILS: 69 % (ref 36–65)
SEGMENTED NEUTROPHILS ABSOLUTE COUNT: 6.68 K/UL (ref 1.5–8.1)
SODIUM BLD-SCNC: 135 MMOL/L (ref 135–144)
SPECIMEN DESCRIPTION: ABNORMAL
TOTAL PROTEIN: 6.3 G/DL (ref 6.4–8.3)
TSH SERPL DL<=0.05 MIU/L-ACNC: 0.9 UIU/ML (ref 0.3–5)
WBC # BLD: 9.6 K/UL (ref 3.5–11.3)

## 2022-08-24 PROCEDURE — 81001 URINALYSIS AUTO W/SCOPE: CPT

## 2022-08-24 PROCEDURE — 6370000000 HC RX 637 (ALT 250 FOR IP)

## 2022-08-24 PROCEDURE — 83605 ASSAY OF LACTIC ACID: CPT

## 2022-08-24 PROCEDURE — 6360000002 HC RX W HCPCS

## 2022-08-24 PROCEDURE — 96365 THER/PROPH/DIAG IV INF INIT: CPT

## 2022-08-24 PROCEDURE — 36415 COLL VENOUS BLD VENIPUNCTURE: CPT

## 2022-08-24 PROCEDURE — 87040 BLOOD CULTURE FOR BACTERIA: CPT

## 2022-08-24 PROCEDURE — 93005 ELECTROCARDIOGRAM TRACING: CPT | Performed by: OBSTETRICS & GYNECOLOGY

## 2022-08-24 PROCEDURE — 96375 TX/PRO/DX INJ NEW DRUG ADDON: CPT

## 2022-08-24 PROCEDURE — 85025 COMPLETE CBC W/AUTO DIFF WBC: CPT

## 2022-08-24 PROCEDURE — 87086 URINE CULTURE/COLONY COUNT: CPT

## 2022-08-24 PROCEDURE — 71045 X-RAY EXAM CHEST 1 VIEW: CPT

## 2022-08-24 PROCEDURE — 80053 COMPREHEN METABOLIC PANEL: CPT

## 2022-08-24 PROCEDURE — 2580000003 HC RX 258

## 2022-08-24 PROCEDURE — 99285 EMERGENCY DEPT VISIT HI MDM: CPT

## 2022-08-24 PROCEDURE — 87635 SARS-COV-2 COVID-19 AMP PRB: CPT

## 2022-08-24 PROCEDURE — 84443 ASSAY THYROID STIM HORMONE: CPT

## 2022-08-24 RX ORDER — ACETAMINOPHEN 325 MG/1
650 TABLET ORAL ONCE
Status: COMPLETED | OUTPATIENT
Start: 2022-08-24 | End: 2022-08-24

## 2022-08-24 RX ORDER — KETOROLAC TROMETHAMINE 30 MG/ML
30 INJECTION, SOLUTION INTRAMUSCULAR; INTRAVENOUS ONCE
Status: COMPLETED | OUTPATIENT
Start: 2022-08-24 | End: 2022-08-24

## 2022-08-24 RX ORDER — FENTANYL CITRATE 50 UG/ML
25 INJECTION, SOLUTION INTRAMUSCULAR; INTRAVENOUS ONCE
Status: COMPLETED | OUTPATIENT
Start: 2022-08-24 | End: 2022-08-24

## 2022-08-24 RX ORDER — DOXYCYCLINE HYCLATE 100 MG
100 TABLET ORAL ONCE
Status: COMPLETED | OUTPATIENT
Start: 2022-08-25 | End: 2022-08-25

## 2022-08-24 RX ORDER — 0.9 % SODIUM CHLORIDE 0.9 %
30 INTRAVENOUS SOLUTION INTRAVENOUS ONCE
Status: COMPLETED | OUTPATIENT
Start: 2022-08-24 | End: 2022-08-25

## 2022-08-24 RX ADMIN — FENTANYL CITRATE 25 MCG: 50 INJECTION, SOLUTION INTRAMUSCULAR; INTRAVENOUS at 23:06

## 2022-08-24 RX ADMIN — CEFTRIAXONE SODIUM 1000 MG: 1 INJECTION, POWDER, FOR SOLUTION INTRAMUSCULAR; INTRAVENOUS at 23:43

## 2022-08-24 RX ADMIN — KETOROLAC TROMETHAMINE 30 MG: 30 INJECTION, SOLUTION INTRAMUSCULAR; INTRAVENOUS at 23:06

## 2022-08-24 RX ADMIN — SODIUM CHLORIDE 2586 ML: 9 INJECTION, SOLUTION INTRAVENOUS at 23:08

## 2022-08-24 RX ADMIN — ACETAMINOPHEN 650 MG: 325 TABLET ORAL at 22:39

## 2022-08-24 ASSESSMENT — ENCOUNTER SYMPTOMS
VOMITING: 0
DIARRHEA: 0
ABDOMINAL PAIN: 1
CONSTIPATION: 0
NAUSEA: 0
PHOTOPHOBIA: 0
BLOOD IN STOOL: 0

## 2022-08-24 ASSESSMENT — PAIN DESCRIPTION - PAIN TYPE: TYPE: ACUTE PAIN

## 2022-08-24 ASSESSMENT — PAIN DESCRIPTION - LOCATION: LOCATION: BACK;PELVIS

## 2022-08-24 ASSESSMENT — PAIN DESCRIPTION - FREQUENCY: FREQUENCY: CONTINUOUS

## 2022-08-24 ASSESSMENT — PAIN - FUNCTIONAL ASSESSMENT: PAIN_FUNCTIONAL_ASSESSMENT: 0-10

## 2022-08-24 ASSESSMENT — PAIN DESCRIPTION - DESCRIPTORS: DESCRIPTORS: SHARP

## 2022-08-24 ASSESSMENT — PAIN SCALES - GENERAL: PAINLEVEL_OUTOF10: 10

## 2022-08-25 ENCOUNTER — TELEPHONE (OUTPATIENT)
Dept: PHARMACY | Age: 22
End: 2022-08-25

## 2022-08-25 VITALS
SYSTOLIC BLOOD PRESSURE: 115 MMHG | TEMPERATURE: 98 F | OXYGEN SATURATION: 98 % | HEIGHT: 64 IN | HEART RATE: 91 BPM | WEIGHT: 190 LBS | BODY MASS INDEX: 32.44 KG/M2 | DIASTOLIC BLOOD PRESSURE: 76 MMHG | RESPIRATION RATE: 22 BRPM

## 2022-08-25 PROBLEM — R51.9 ACUTE HEADACHE: Status: ACTIVE | Noted: 2022-08-25

## 2022-08-25 PROBLEM — U07.1 COVID-19 VIRUS DETECTED: Status: ACTIVE | Noted: 2022-08-25

## 2022-08-25 PROBLEM — R50.9 FEVER: Status: ACTIVE | Noted: 2022-08-25

## 2022-08-25 LAB
ABSOLUTE EOS #: 0.08 K/UL (ref 0–0.44)
ABSOLUTE IMMATURE GRANULOCYTE: 0.04 K/UL (ref 0–0.3)
ABSOLUTE LYMPH #: 1.36 K/UL (ref 1.1–3.7)
ABSOLUTE MONO #: 0.61 K/UL (ref 0.1–1.2)
BASOPHILS # BLD: 0 % (ref 0–2)
BASOPHILS ABSOLUTE: <0.03 K/UL (ref 0–0.2)
BILIRUBIN URINE: NEGATIVE
CASTS UA: ABNORMAL /LPF (ref 0–8)
COLOR: YELLOW
CULTURE: NORMAL
EKG ATRIAL RATE: 134 BPM
EKG P AXIS: 59 DEGREES
EKG P-R INTERVAL: 154 MS
EKG Q-T INTERVAL: 288 MS
EKG QRS DURATION: 90 MS
EKG QTC CALCULATION (BAZETT): 430 MS
EKG R AXIS: 6 DEGREES
EKG T AXIS: 46 DEGREES
EKG VENTRICULAR RATE: 134 BPM
EOSINOPHILS RELATIVE PERCENT: 1 % (ref 1–4)
EPITHELIAL CELLS UA: ABNORMAL /HPF (ref 0–5)
GLUCOSE URINE: NEGATIVE
HCT VFR BLD CALC: 34.6 % (ref 36.3–47.1)
HEMOGLOBIN: 11.7 G/DL (ref 11.9–15.1)
IMMATURE GRANULOCYTES: 1 %
KETONES, URINE: NEGATIVE
LACTIC ACID, SEPSIS WHOLE BLOOD: 0.5 MMOL/L (ref 0.5–1.9)
LEUKOCYTE ESTERASE, URINE: ABNORMAL
LYMPHOCYTES # BLD: 20 % (ref 24–43)
MCH RBC QN AUTO: 31.8 PG (ref 25.2–33.5)
MCHC RBC AUTO-ENTMCNC: 33.8 G/DL (ref 28.4–34.8)
MCV RBC AUTO: 94 FL (ref 82.6–102.9)
MONOCYTES # BLD: 9 % (ref 3–12)
NITRITE, URINE: NEGATIVE
NRBC AUTOMATED: 0 PER 100 WBC
PDW BLD-RTO: 12.7 % (ref 11.8–14.4)
PH UA: 5.5 (ref 5–8)
PLATELET # BLD: 99 K/UL (ref 138–453)
PMV BLD AUTO: 10.5 FL (ref 8.1–13.5)
PROTEIN UA: NEGATIVE
RBC # BLD: 3.68 M/UL (ref 3.95–5.11)
RBC UA: ABNORMAL /HPF (ref 0–4)
SEG NEUTROPHILS: 69 % (ref 36–65)
SEGMENTED NEUTROPHILS ABSOLUTE COUNT: 4.63 K/UL (ref 1.5–8.1)
SPECIFIC GRAVITY UA: 1.02 (ref 1–1.03)
SPECIMEN DESCRIPTION: NORMAL
TURBIDITY: CLEAR
URINE HGB: NEGATIVE
UROBILINOGEN, URINE: NORMAL
WBC # BLD: 6.7 K/UL (ref 3.5–11.3)
WBC UA: ABNORMAL /HPF (ref 0–5)

## 2022-08-25 PROCEDURE — 6360000002 HC RX W HCPCS

## 2022-08-25 PROCEDURE — G0378 HOSPITAL OBSERVATION PER HR: HCPCS

## 2022-08-25 PROCEDURE — A4216 STERILE WATER/SALINE, 10 ML: HCPCS

## 2022-08-25 PROCEDURE — 96376 TX/PRO/DX INJ SAME DRUG ADON: CPT

## 2022-08-25 PROCEDURE — 2580000003 HC RX 258

## 2022-08-25 PROCEDURE — 2500000003 HC RX 250 WO HCPCS

## 2022-08-25 PROCEDURE — 83605 ASSAY OF LACTIC ACID: CPT

## 2022-08-25 PROCEDURE — 6370000000 HC RX 637 (ALT 250 FOR IP)

## 2022-08-25 PROCEDURE — 99253 IP/OBS CNSLTJ NEW/EST LOW 45: CPT | Performed by: INTERNAL MEDICINE

## 2022-08-25 PROCEDURE — 85025 COMPLETE CBC W/AUTO DIFF WBC: CPT

## 2022-08-25 PROCEDURE — 96375 TX/PRO/DX INJ NEW DRUG ADDON: CPT

## 2022-08-25 PROCEDURE — 93010 ELECTROCARDIOGRAM REPORT: CPT | Performed by: INTERNAL MEDICINE

## 2022-08-25 RX ORDER — DOXYCYCLINE HYCLATE 100 MG
100 TABLET ORAL EVERY 12 HOURS SCHEDULED
Status: DISCONTINUED | OUTPATIENT
Start: 2022-08-25 | End: 2022-08-25 | Stop reason: HOSPADM

## 2022-08-25 RX ORDER — ACETAMINOPHEN 500 MG
1000 TABLET ORAL EVERY 6 HOURS
Status: DISCONTINUED | OUTPATIENT
Start: 2022-08-25 | End: 2022-08-25 | Stop reason: HOSPADM

## 2022-08-25 RX ORDER — 0.9 % SODIUM CHLORIDE 0.9 %
30 INTRAVENOUS SOLUTION INTRAVENOUS ONCE
Status: DISCONTINUED | OUTPATIENT
Start: 2022-08-25 | End: 2022-08-25

## 2022-08-25 RX ORDER — SODIUM CHLORIDE 9 MG/ML
INJECTION, SOLUTION INTRAVENOUS CONTINUOUS
Status: DISCONTINUED | OUTPATIENT
Start: 2022-08-25 | End: 2022-08-25 | Stop reason: HOSPADM

## 2022-08-25 RX ORDER — DIPHENHYDRAMINE HCL 25 MG
25 TABLET ORAL EVERY 6 HOURS PRN
Status: DISCONTINUED | OUTPATIENT
Start: 2022-08-25 | End: 2022-08-25 | Stop reason: HOSPADM

## 2022-08-25 RX ORDER — IBUPROFEN 600 MG/1
600 TABLET ORAL EVERY 6 HOURS PRN
Qty: 40 TABLET | Refills: 1 | Status: SHIPPED | OUTPATIENT
Start: 2022-08-25

## 2022-08-25 RX ORDER — IBUPROFEN 400 MG/1
600 TABLET ORAL EVERY 6 HOURS
Status: DISCONTINUED | OUTPATIENT
Start: 2022-08-27 | End: 2022-08-25 | Stop reason: HOSPADM

## 2022-08-25 RX ORDER — DOXYCYCLINE HYCLATE 100 MG
100 TABLET ORAL 2 TIMES DAILY
Qty: 28 TABLET | Refills: 0 | Status: SHIPPED | OUTPATIENT
Start: 2022-08-25 | End: 2022-09-08

## 2022-08-25 RX ORDER — CALCIUM CARBONATE 200(500)MG
500 TABLET,CHEWABLE ORAL 3 TIMES DAILY PRN
Status: DISCONTINUED | OUTPATIENT
Start: 2022-08-25 | End: 2022-08-25 | Stop reason: HOSPADM

## 2022-08-25 RX ORDER — AMITRIPTYLINE HYDROCHLORIDE 25 MG/1
25 TABLET, FILM COATED ORAL NIGHTLY
Status: DISCONTINUED | OUTPATIENT
Start: 2022-08-25 | End: 2022-08-25

## 2022-08-25 RX ORDER — OXYCODONE HYDROCHLORIDE 5 MG/1
10 TABLET ORAL EVERY 4 HOURS PRN
Status: DISCONTINUED | OUTPATIENT
Start: 2022-08-25 | End: 2022-08-25 | Stop reason: HOSPADM

## 2022-08-25 RX ORDER — ONDANSETRON 4 MG/1
4 TABLET, ORALLY DISINTEGRATING ORAL EVERY 8 HOURS PRN
Status: DISCONTINUED | OUTPATIENT
Start: 2022-08-25 | End: 2022-08-25 | Stop reason: HOSPADM

## 2022-08-25 RX ORDER — GUAIFENESIN 600 MG/1
600 TABLET, EXTENDED RELEASE ORAL 2 TIMES DAILY
Status: DISCONTINUED | OUTPATIENT
Start: 2022-08-25 | End: 2022-08-25 | Stop reason: HOSPADM

## 2022-08-25 RX ORDER — CYCLOBENZAPRINE HCL 10 MG
10 TABLET ORAL 3 TIMES DAILY PRN
Status: DISCONTINUED | OUTPATIENT
Start: 2022-08-25 | End: 2022-08-25 | Stop reason: HOSPADM

## 2022-08-25 RX ORDER — OXYCODONE HYDROCHLORIDE 5 MG/1
5 TABLET ORAL EVERY 6 HOURS PRN
Qty: 10 TABLET | Refills: 0 | Status: SHIPPED | OUTPATIENT
Start: 2022-08-25 | End: 2022-08-28

## 2022-08-25 RX ORDER — SODIUM CHLORIDE 0.9 % (FLUSH) 0.9 %
5-40 SYRINGE (ML) INJECTION PRN
Status: DISCONTINUED | OUTPATIENT
Start: 2022-08-25 | End: 2022-08-25 | Stop reason: HOSPADM

## 2022-08-25 RX ORDER — KETOROLAC TROMETHAMINE 30 MG/ML
30 INJECTION, SOLUTION INTRAMUSCULAR; INTRAVENOUS EVERY 6 HOURS
Status: DISCONTINUED | OUTPATIENT
Start: 2022-08-25 | End: 2022-08-25 | Stop reason: HOSPADM

## 2022-08-25 RX ORDER — OXYCODONE HYDROCHLORIDE 5 MG/1
5 TABLET ORAL EVERY 4 HOURS PRN
Status: DISCONTINUED | OUTPATIENT
Start: 2022-08-25 | End: 2022-08-25 | Stop reason: HOSPADM

## 2022-08-25 RX ORDER — 0.9 % SODIUM CHLORIDE 0.9 %
1000 INTRAVENOUS SOLUTION INTRAVENOUS ONCE
Status: DISCONTINUED | OUTPATIENT
Start: 2022-08-25 | End: 2022-08-25

## 2022-08-25 RX ORDER — METRONIDAZOLE 500 MG/1
500 TABLET ORAL 2 TIMES DAILY
Qty: 28 TABLET | Refills: 0 | Status: SHIPPED | OUTPATIENT
Start: 2022-08-25 | End: 2022-09-08

## 2022-08-25 RX ORDER — DOXYCYCLINE HYCLATE 100 MG
100 TABLET ORAL 2 TIMES DAILY
Qty: 14 TABLET | Refills: 0 | Status: SHIPPED | OUTPATIENT
Start: 2022-08-25 | End: 2022-08-25 | Stop reason: SDUPTHER

## 2022-08-25 RX ORDER — LANOLIN ALCOHOL/MO/W.PET/CERES
3 CREAM (GRAM) TOPICAL NIGHTLY PRN
Status: DISCONTINUED | OUTPATIENT
Start: 2022-08-25 | End: 2022-08-25 | Stop reason: HOSPADM

## 2022-08-25 RX ORDER — 0.9 % SODIUM CHLORIDE 0.9 %
1000 INTRAVENOUS SOLUTION INTRAVENOUS ONCE
Status: COMPLETED | OUTPATIENT
Start: 2022-08-25 | End: 2022-08-25

## 2022-08-25 RX ORDER — GABAPENTIN 300 MG/1
300 CAPSULE ORAL 3 TIMES DAILY
Status: DISCONTINUED | OUTPATIENT
Start: 2022-08-25 | End: 2022-08-25 | Stop reason: HOSPADM

## 2022-08-25 RX ORDER — ONDANSETRON 4 MG/1
4 TABLET, FILM COATED ORAL 3 TIMES DAILY PRN
Qty: 15 TABLET | Refills: 0 | Status: SHIPPED | OUTPATIENT
Start: 2022-08-25

## 2022-08-25 RX ORDER — AMITRIPTYLINE HYDROCHLORIDE 25 MG/1
25 TABLET, FILM COATED ORAL NIGHTLY
Status: DISCONTINUED | OUTPATIENT
Start: 2022-08-25 | End: 2022-08-25 | Stop reason: HOSPADM

## 2022-08-25 RX ORDER — BENZONATATE 100 MG/1
100 CAPSULE ORAL 3 TIMES DAILY PRN
Status: DISCONTINUED | OUTPATIENT
Start: 2022-08-25 | End: 2022-08-25 | Stop reason: HOSPADM

## 2022-08-25 RX ORDER — METRONIDAZOLE 500 MG/1
500 TABLET ORAL EVERY 12 HOURS
Status: DISCONTINUED | OUTPATIENT
Start: 2022-08-25 | End: 2022-08-25 | Stop reason: HOSPADM

## 2022-08-25 RX ORDER — SODIUM CHLORIDE 9 MG/ML
25 INJECTION, SOLUTION INTRAVENOUS PRN
Status: DISCONTINUED | OUTPATIENT
Start: 2022-08-25 | End: 2022-08-25 | Stop reason: HOSPADM

## 2022-08-25 RX ORDER — ONDANSETRON 2 MG/ML
4 INJECTION INTRAMUSCULAR; INTRAVENOUS EVERY 6 HOURS PRN
Status: DISCONTINUED | OUTPATIENT
Start: 2022-08-25 | End: 2022-08-25 | Stop reason: HOSPADM

## 2022-08-25 RX ADMIN — METRONIDAZOLE 500 MG: 500 TABLET ORAL at 01:48

## 2022-08-25 RX ADMIN — FAMOTIDINE 20 MG: 10 INJECTION, SOLUTION INTRAVENOUS at 08:20

## 2022-08-25 RX ADMIN — GUAIFENESIN 600 MG: 600 TABLET, EXTENDED RELEASE ORAL at 08:20

## 2022-08-25 RX ADMIN — SODIUM CHLORIDE: 9 INJECTION, SOLUTION INTRAVENOUS at 09:50

## 2022-08-25 RX ADMIN — ACETAMINOPHEN 1000 MG: 500 TABLET ORAL at 07:00

## 2022-08-25 RX ADMIN — GUAIFENESIN 600 MG: 600 TABLET, EXTENDED RELEASE ORAL at 01:48

## 2022-08-25 RX ADMIN — DOXYCYCLINE HYCLATE 100 MG: 100 TABLET, COATED ORAL at 00:25

## 2022-08-25 RX ADMIN — SODIUM CHLORIDE: 9 INJECTION, SOLUTION INTRAVENOUS at 03:40

## 2022-08-25 RX ADMIN — KETOROLAC TROMETHAMINE 30 MG: 30 INJECTION, SOLUTION INTRAMUSCULAR; INTRAVENOUS at 11:08

## 2022-08-25 RX ADMIN — SODIUM CHLORIDE 1000 ML: 9 INJECTION, SOLUTION INTRAVENOUS at 03:48

## 2022-08-25 RX ADMIN — GABAPENTIN 300 MG: 300 CAPSULE ORAL at 08:20

## 2022-08-25 RX ADMIN — FAMOTIDINE 20 MG: 10 INJECTION, SOLUTION INTRAVENOUS at 01:51

## 2022-08-25 RX ADMIN — KETOROLAC TROMETHAMINE 30 MG: 30 INJECTION, SOLUTION INTRAMUSCULAR; INTRAVENOUS at 05:01

## 2022-08-25 ASSESSMENT — PAIN SCALES - GENERAL
PAINLEVEL_OUTOF10: 8
PAINLEVEL_OUTOF10: 8
PAINLEVEL_OUTOF10: 9

## 2022-08-25 ASSESSMENT — PAIN DESCRIPTION - LOCATION
LOCATION: PELVIS;ABDOMEN
LOCATION: ABDOMEN
LOCATION: ABDOMEN

## 2022-08-25 NOTE — CONSULTS
[facility]  Office: [address], DO, Alyssa Nyhan, DO, Aisha Vásquez, DO, Lisa Anglin Blood, DO, Lane Branham MD, Shagufta Carroll MD, Malachi Vega MD, Constantin Carballo MD,  Pepper Marte MD, Rosana Sanchez MD, Idania Kelly, DO, Carrie Palumbo MD,  Isreal Peters MD, Deepika Burroughs MD, Olimpia Pérez, DO, Neal Velarde MD, Yassine Hartley MD, Carmelo Tanner MD, Tunde Larson DO, Prasanth Duggan MD, Danny De La Cruz MD, Emiliana Milan, CNP,  Kaleigh Morrow, CNP, Juan King, CNP, Azalea Orosco, CNP, Mayito Purvis PA-C, Deon Major, DNP, Maximo Power, CNP, Robinson Lorenzo, CNP, Bolivar Barbosa, CNP, Traci Olivares, CNP, Markus Byrd, CNP, Saúl Ma, CNS, Shantanu Chavez, [facility], Landon Nevarez, CNP, Fede Cisneros, CNP, Jewels Luna, CNP           [address] / HISTORY AND PHYSICAL EXAMINATION            Date:   8/25/2022  Patient name:  Leah Chris  Date of admission:  8/24/2022 10:07 PM  MRN:   [id number]  Account:  [de-identified]  YOB: 2000  PCP:    Myrtle Russo MD  Room:   19/19  Code Status:    Full Code    Physician Requesting Consult: Rosalio Castro MD    Reason for Consult:  covid    Chief Complaint:     Chief Complaint   Patient presents with    Pelvic Pain    Back Pain    Fever   \"I didn't feel good\"    History Obtained From:     patient    History of Present Illness:     75-year-old female with history of anxiety and depression who presented with fevers and chills with night sweats with pelvic pain x24 hours. Recently diagnosed with chlamydia with suspected PID. Patient also screened for COVID and was positive. We were consulted to evaluate fevers and lieu of concern for COVID with tachycardia    Patient does describe frontal headaches with increased fatigue and malaise for the past 2 to 3 days.   She does describe some mild lightheadedness but denies any near syncope or collapse, denies postnasal drip sinus congestion pleurisy chest pain cough. Initially denies abdominal pain but then points to her lower pelvis and groin and describes back pain, patient does describe the groin pain for approximately 1 week with worsening symptoms over the past 1 to 2 days. Denies nausea or vomiting or diarrhea. Denies abnormal vaginal discharge. Denies urinary symptoms. Denies dental caries. Denies myalgias or arthralgias. Denies any skin infections rashes or lesions    Denies any obvious exposure to COVID-19. Not vaccinated  Past Medical History:     Past Medical History:   Diagnosis Date    Anxiety     Depression     HSV-2 infection     LGSIL of cervix of undetermined significance 05/15/2020    LGSIL of cervix of undetermined significance 07/26/2021    Pelvic inflammatory disease     Psychiatric problem         Past Surgical History:     Past Surgical History:   Procedure Laterality Date    ADENOIDECTOMY      APPENDECTOMY      FRACTURE SURGERY Left     lt forarm (denies hardware)    TYMPANOSTOMY TUBE PLACEMENT          Medications Prior to Admission:     Prior to Admission medications    Medication Sig Start Date End Date Taking? Authorizing Provider   cyclobenzaprine (FLEXERIL) 10 MG tablet Take 1 tablet by mouth nightly as needed for Muscle spasms 8/22/22 9/1/22  Joycelyn Spray DO Cindy   cefdinir (OMNICEF) 300 MG capsule TAKE ONE CAPSULE BY MOUTH TWICE DAILY 3/21/22   Historical Provider, MD   phenazopyridine (PYRIDIUM) 100 MG tablet TAKE TWO TABLETS BY MOUTH THREE TIMES DAILY AS NEEDED 3/18/22   Historical Provider, MD   gabapentin (NEURONTIN) 300 MG capsule Take 2 capsules by mouth 3 times daily for 30 days.  1/26/22 3/7/22  Ml Simon MD   amitriptyline (ELAVIL) 25 MG tablet Take 1 tablet by mouth nightly 1/26/22   Ml Simon MD   polyethylene glycol (GLYCOLAX) 17 g packet Take 17 g by mouth daily as needed for Constipation  Patient not taking: Reported on 2/10/2022 1/26/22   Shea Damian MD        Allergies:     Patient has no known allergies. Social History:     Tobacco:    reports that she has never smoked. She has never used smokeless tobacco.  Alcohol:      reports current alcohol use. Drug Use:  reports no history of drug use. She denies lifetime tobacco free, she does drink alcohol on occasion but denies binge drinking or excessive alcohol, denies illegal drugs, denies travel, denies any known exposures to COVID or sick contacts    Family History:     Family History   Problem Relation Age of Onset    No Known Problems Father     No Known Problems Mother    Patient's parents are both healthy    Review of Systems:     Positive and Negative as described in HPI. Review of Systems  Denies recurrent bronchitis or pneumonia, denies diabetes sleep apnea. Denies palpitations irregular heartbeat or tachycardia. Eating and drinking okay. Denies history of DVT PE. Denies bruising or bleeding issues rectal bleeding, melena. Denies lower extremity edema. Denies heat or cold intolerance. Denies constipation. Denies medication changes. Denies any suicidal homicidal ideation. Denies hallucinations       without prior complications from her pregnancies. Last menstrual period was  without issues. Review of systems otherwise unremarkable 10 system review and otherwise negative    Physical Exam:     /74   Pulse 84   Temp 98.1 °F (36.7 °C) (Oral)   Resp 16   Ht 5' 4\" (1.626 m)   Wt 190 lb (86.2 kg)   LMP 2022 (Exact Date)   SpO2 99%   BMI 32.61 kg/m²   Temp (24hrs), Av.3 °F (37.9 °C), Min:98.1 °F (36.7 °C), Max:102.4 °F (39.1 °C)    No results for input(s): POCGLU in the last 72 hours.     Intake/Output Summary (Last 24 hours) at 2022 0579  Last data filed at 2022 0458  Gross per 24 hour   Intake 3733.53 ml   Output --   Net 3733.53 ml       Physical Exam  General sleeping but arouses easily, appropriate upon awakening pleasant  Eye exam pupils equally round reactive sclera nonicteric normal horizontal gaze  ENT voice mucous memories adequate dentition face symmetric neck supple  Cardiovascular mildly tachycardic, normal S1-S2 Nemmers is a gallops no JVD  Lungs slight crackles at bases did not improve with deep inspiration with adequate air exchange nonlabored no tachypnea no accessory muscle use no wheezing or rhonchi  GI soft obese nontender nondistended bowel sounds present  Vascular intact dorsalis pedis posterior tibialis without edema  Derm limited as patient is mostly dressed however no rashes lesions or skin infections  Musculoskeletal passive range of motion of upper and lower limbs unremarkable no synovitis or joint effusions  Neuro nonfocal normal speech and cognition strength symmetric upper and lower limbs sensation grossly intact  Investigations:      Laboratory Testing:  Recent Results (from the past 24 hour(s))   CBC with Auto Differential    Collection Time: 08/24/22 10:48 PM   Result Value Ref Range    WBC 9.6 3.5 - 11.3 k/uL    RBC 4.46 3.95 - 5.11 m/uL    Hemoglobin 14.2 11.9 - 15.1 g/dL    Hematocrit 41.9 36.3 - 47.1 %    MCV 93.9 82.6 - 102.9 fL    MCH 31.8 25.2 - 33.5 pg    MCHC 33.9 28.4 - 34.8 g/dL    RDW 12.7 11.8 - 14.4 %    Platelets 602 321 - 663 k/uL    MPV 10.7 8.1 - 13.5 fL    NRBC Automated 0.0 0.0 per 100 WBC    Seg Neutrophils 69 (H) 36 - 65 %    Lymphocytes 22 (L) 24 - 43 %    Monocytes 7 3 - 12 %    Eosinophils % 1 1 - 4 %    Basophils 1 0 - 2 %    Immature Granulocytes 0 0 %    Segs Absolute 6.68 1.50 - 8.10 k/uL    Absolute Lymph # 2.09 1.10 - 3.70 k/uL    Absolute Mono # 0.69 0.10 - 1.20 k/uL    Absolute Eos # 0.06 0.00 - 0.44 k/uL    Basophils Absolute 0.05 0.00 - 0.20 k/uL    Absolute Immature Granulocyte 0.04 0.00 - 0.30 k/uL   Comprehensive Metabolic Panel    Collection Time: 08/24/22 10:48 PM   Result Value Ref Range    Glucose 124 (H) 70 - 99 mg/dL    BUN 7 6 - 20 mg/dL    Creatinine 0.81 0.50 - 0.90 mg/dL    Calcium 8.8 8.6 - 10.4 mg/dL    Sodium 135 135 - 144 mmol/L    Potassium 3.9 3.7 - 5.3 mmol/L    Chloride 100 98 - 107 mmol/L    CO2 26 20 - 31 mmol/L    Anion Gap 9 9 - 17 mmol/L    Alkaline Phosphatase 68 35 - 104 U/L    ALT 18 5 - 33 U/L    AST 19 <32 U/L    Total Bilirubin 0.55 0.3 - 1.2 mg/dL    Total Protein 6.3 (L) 6.4 - 8.3 g/dL    Albumin 3.6 3.5 - 5.2 g/dL    Albumin/Globulin Ratio 1.3 1.0 - 2.5    GFR Non-African American >60 >60 mL/min    GFR African American >60 >60 mL/min    GFR Comment         Lactate, Sepsis    Collection Time: 08/24/22 10:48 PM   Result Value Ref Range    Lactic Acid, Sepsis, Whole Blood 1.2 0.5 - 1.9 mmol/L   TSH with Reflex    Collection Time: 08/24/22 10:48 PM   Result Value Ref Range    TSH 0.90 0.30 - 5.00 uIU/mL   Culture, Blood 1    Collection Time: 08/24/22 10:49 PM    Specimen: Blood   Result Value Ref Range    Specimen Description . BLOOD     Special Requests LT ACUB 10ML     Culture NO GROWTH <24 HRS    Culture, Blood 1    Collection Time: 08/24/22 11:04 PM    Specimen: Blood   Result Value Ref Range    Specimen Description . BLOOD     Special Requests RT ACUB 10ML     Culture NO GROWTH <24 HRS    Urinalysis with Microscopic    Collection Time: 08/24/22 11:18 PM   Result Value Ref Range    Color, UA Yellow Yellow    Turbidity UA Clear Clear    Glucose, Ur NEGATIVE NEGATIVE    Bilirubin Urine NEGATIVE NEGATIVE    Ketones, Urine NEGATIVE NEGATIVE    Specific Gravity, UA 1.019 1.005 - 1.030    Urine Hgb NEGATIVE NEGATIVE    pH, UA 5.5 5.0 - 8.0    Protein, UA NEGATIVE NEGATIVE    Urobilinogen, Urine Normal Normal    Nitrite, Urine NEGATIVE NEGATIVE    Leukocyte Esterase, Urine SMALL (A) NEGATIVE    WBC, UA 10 TO 20 0 - 5 /HPF    RBC, UA 0 TO 2 0 - 4 /HPF    Casts UA  0 - 8 /LPF     2 TO 5 HYALINE Reference range defined for non-centrifuged specimen.     Epithelial Cells UA 5 TO 10 0 - 5 /HPF   COVID-19, Rapid    Collection Time: 08/24/22 11:19 PM    Specimen: Nasopharyngeal Swab   Result Value Ref Range    Specimen Description . NASOPHARYNGEAL SWAB     SARS-CoV-2, Rapid DETECTED (A) Not Detected   Lactate, Sepsis    Collection Time: 08/25/22  3:48 AM   Result Value Ref Range    Lactic Acid, Sepsis, Whole Blood 0.5 0.5 - 1.9 mmol/L       Imaging/Diagonstics:  US NON OB TRANSVAGINAL    Result Date: 8/22/2022  1. Bilateral ovarian follicles. Normal arterial and venous Doppler flow in association with the ovaries. 2. Nabothian cysts in the cervix. 3. Endometrial stripe thickness measures 8 mm, within normal limits. RECOMMENDATIONS: Small ovarian follicle, normal finding. No follow-up imaging is recommended. Reference: Radiology 2019 Nov;293(2):359-131     CT ABDOMEN PELVIS W IV CONTRAST Additional Contrast? None    Result Date: 8/22/2022  1. No acute intra-abdominal abnormality. 2. 1.3 cm right adnexal cyst. RECOMMENDATIONS: 1.3 cm simple Pathology: 1.3 cm right adnexal simple-appearing cyst. No follow-up imaging is recommended. Reference: JACR 2020 Feb;17(2):248-254     XR CHEST PORTABLE    Result Date: 8/24/2022  No acute cardiopulmonary abnormality. US DUP ABD PEL RETRO SCROT LIMITED    Result Date: 8/22/2022  1. Bilateral ovarian follicles. Normal arterial and venous Doppler flow in association with the ovaries. 2. Nabothian cysts in the cervix. 3. Endometrial stripe thickness measures 8 mm, within normal limits. RECOMMENDATIONS: Small ovarian follicle, normal finding. No follow-up imaging is recommended. Reference: Radiology 2019 Nov;293(2):372-052     XR HIP 2-3 VW W PELVIS RIGHT    Result Date: 8/22/2022  No acute abnormality of the hip.      EKG sinus tachycardia, no acute ST-T changes      Assessment :      Hospital Problems             Last Modified POA    Acute headache 8/25/2022 Yes    Fever 8/25/2022 Yes    COVID-19 virus detected 8/25/2022 Yes       Plan:       PID-defer to primary service for further treatment with history of chlamydia  COVID positive with fevers/sinus tachycardia/headaches poss related to viral prodrome but symptoms are very nonspecific. Chest x-ray unremarkable. Continue antibiotics per gynecology recommendations. Discussed with patient social isolation. Encouraged vaccination in 3 months if patient interested. Continue other supportive care    Appreciate consult . discussed with Dr. Eileen Vivas. Encourage oral hydration, antipyretics, Tylenol for pain control/headaches. no objection to discharge when okay with primary service. will sign off.   Call with questions or concerns    Consultations:   IP CONSULT TO OB GYN  IP CONSULT TO INTERNAL MEDICINE      Marie Yan MD  8/25/2022  5:48 AM    Copy sent to Dr. Sandra Aguirre MD

## 2022-08-25 NOTE — ED NOTES
The following labs labeled with pt sticker and tubed to lab:     [] Blue     [x] Lavender   [] on ice  [] Green/yellow  [] Green/black [] on ice  [] Taina [place]  [] on ice  [] Yellow  [] Red  [] Pink  [] VBG  [] VBG    [] COVID-19 swab    [] Rapid  [] PCR  [] Flu swab  [] Peds Viral Panel     [] Urine Sample  [] Pelvic Cultures  [] Blood Cultures   [] STREP Cultures         Rashid Fowler RN  08/25/22 [id number]

## 2022-08-25 NOTE — ED NOTES
The following labs labeled with pt sticker and tubed to lab:     [] Blue     [] Lavender   [] on ice  [] Green/yellow  [x] Green/black [x] on ice  [] Maryanne Bailey  [] on ice  [] Yellow  [] Red  [] Pink  [] VBG  [] VBG    [] COVID-19 swab    [] Rapid  [] PCR  [] Flu swab  [] Peds Viral Panel     [] Urine Sample  [] Pelvic Cultures  [] Blood Cultures   [] STREP Cultures         Elizabeth Sheets RN  08/25/22 [id number]

## 2022-08-25 NOTE — TELEPHONE ENCOUNTER
CLINICAL PHARMACY NOTE:  Telephone Follow-up for Positive STD Test    At the time of Paul Pagan's visit to [facility] on 8/22/22 STD testing was performed. DNA testing was positive for Chlamydia. Pregnancy status:  Negative. Unable to reach patient by phone. Sent letter to patient on 8/25/22 regarding need to start antibiotic therapy, abstain from sexual intercourse and inform sexual partners.        Per protocol, prescription for doxycycline 100mg orally twice daily x 7 days sent to [facility] outpatient pharmacy on behalf of Dr. Lavelle Redman.    ++++++++++++++++++++++++++++++++++++++++++++++++++++++++++++++++++++++++++++++++++++++++++++++++  For Pharmacy [address] Only    Intervention Detail: New Rx: 1, reason: Needs Additional Therapy  Total # of Interventions Recommended: 2  Total # of Interventions Accepted: 2  Time Spent (min): 10
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
## 2022-08-25 NOTE — ED PROVIDER NOTES
[facility]  Emergency Department  Faculty Attestation     I performed a history and physical examination of the patient and discussed management with the resident. I reviewed the residents note and agree with the documented findings and plan of care. Any areas of disagreement are noted on the chart. I was personally present for the key portions of any procedures. I have documented in the chart those procedures where I was not present during the key portions. I have reviewed the emergency nurses triage note. I agree with the chief complaint, past medical history, past surgical history, allergies, medications, social and family history as documented unless otherwise noted below. For Physician Assistant/ Nurse Practitioner cases/documentation I have personally evaluated this patient and have completed at least one if not all key elements of the E/M (history, physical exam, and MDM). Additional findings are as noted. Primary Care Physician:  Jabari Stroud MD    Screenings:  [unfilled]    CHIEF COMPLAINT       Chief Complaint   Patient presents with    Pelvic Pain    Back Pain    Fever       RECENT VITALS:   Temp: (!) 102.4 °F (39.1 °C),  Heart Rate: (!) 129, Resp: 20, BP: 114/74    LABS:  Labs Reviewed   CULTURE, BLOOD 1   CULTURE, BLOOD 1   CULTURE, URINE   COVID-19, RAPID   CBC WITH AUTO DIFFERENTIAL   COMPREHENSIVE METABOLIC PANEL   LACTATE, SEPSIS   URINALYSIS WITH MICROSCOPIC       Radiology  XR CHEST (2 VW)    (Results Pending)       CRITICAL CARE: There was a high probability of clinically significant/life threatening deterioration in this patient's condition which required my urgent intervention. Total critical care time was 5 minutes. This excludes any time for separately reportable procedures.      EKG:  EKG Interpretation    Interpreted by me    Rhythm: normal sinus   Rate: Tachycardia  Axis: normal  Ectopy: none  Conduction: normal  ST Segments: no acute change  T Waves: no acute change  Q Waves: none    Clinical Impression: Sinus tachycardia, no acute ischemic changes    Attending Physician Additional  Notes    Patient has severe pelvic pain bilaterally, radiation to the thighs and lower back. She now has fever and tachycardia. Family has concern for discoloration in her legs which appear pale. She was seen here 2 days ago and had CT imaging which was nondiagnostic, pelvic exam confirms chlamydia. Urine showed urethral findings of white cell count but negative nitrites. Patient has no UTI symptoms. No cough shortness of breath or rhinorrhea. There is mild nausea but no vomiting. No headache or stiff neck. No rash. On exam she is febrile, tachycardic, uncomfortable. Abdomen has mild bilateral pelvic tenderness. No right upper quadrant tenderness or guarding. Lungs are clear. Skin is warm and dry without rash. Normal pulses. Capillary refill 2 seconds. Impression is febrile illness with SIRS, pelvic inflammatory disease. No evidence of Jose-Yoni Beck syndrome. Unlikely TOA since her CT was negative for this 2 days ago. Plan is laboratory studies, IV fluids, antipyretics, antimicrobials. We will give IV Rocephin to cover PID/despite known negative gonorrhea PCR, consider full dose in case there is true UTI though less likely. We will give doxycycline and Flagyl. If patient is improved regarding vital signs and temperature and discomfort consider discharge home on doxycycline twice daily and Flagyl twice daily. If patient has persistent tachycardia, fever, unrelenting pain, will consult gynecology and admit. Patient examined by gynecology, no pelvic motion tenderness noted. Patient does have positive COVID assay which would explain her fever and tachycardia. In my opinion patient will be appropriate for discharge home on antipyretics, rest, oral fluids, doxycycline, without Flagyl.   If patient unable to tolerate the discomfort or tachycardia consider admission. Wale Spear.  Ivan Matias MD, [facility]  Attending Emergency  Physician                Patrick Mejia MD  08/25/22 0003       Patrick Mejia MD  08/25/22 [id number]

## 2022-08-25 NOTE — ED NOTES
Pt presents to the ER via triage ambulatory. Pt c/o pelvic pain w/ back pain. Pt was seen Monday for same complaint, but was told to come back if worsening pain or fever. Pt today has fever 102.4 w/ increased pain. Pt in NAD otherwise, VSS other then tachycardia and fever. Pt placed on bedside monitor, line established, labs drawn. No other complaints at this time. Call light placed within reach.      Landen Bedolla RN  08/25/22 [id number]

## 2022-08-25 NOTE — ED NOTES
The following labs labeled with pt sticker and tubed to lab:     [] Blue     [] Lavender   [] on ice  [] Green/yellow  [] Green/black [] on ice  [] Osiris Sink  [] on ice  [] Yellow  [] Red  [] Pink  [] VBG  [] VBG    [x] COVID-19 swab    [x] Rapid  [] PCR  [] Flu swab  [] Peds Viral Panel     [x] Urine Sample  [] Pelvic Cultures  [x] Blood Cultures   [] STREP Cultures         Corey Pinzon RN  08/24/22 9098

## 2022-08-25 NOTE — PROGRESS NOTES
SPIRITUAL CARE DEPARTMENT - [address]  PROGRESS NOTE    Shift date: 8.24.2022  Shift day: Wednesday   Shift # 2    Room # 19/19   Name: Joyce Heart                Baptism: unknown   Place of [place]ism: unknown    Referral: Routine Visit    Admit Date & Time: 8/24/2022 10:07 PM    Assessment:  Joyce Heart is a 19 y.o. female in the hospital of fever and not feeling well. Upon entering the room writer observes patient and family calm and coping. Intervention:  Writer introduced self and title as  Writer offered space for the patient and family  to express feelings, needs, and concerns and provided a ministry presence. Encouraged patient to feel free to ask questions and provided space for empowerment. Outcome:  Patient and family appreciative of support and care. Plan:  Chaplains will remain available to offer spiritual and emotional support as needed. Electronically signed by Kelly Aponte on 8/25/2022 at 3:00 AM.  [facility]  [phone]       08/24/22 2315   Encounter Summary   Service Provided For: Patient and family together   Referral/Consult From: [facility] Family members   Last Encounter  08/24/22   Complexity of Encounter Moderate   Begin Time 2315   End Time  2330   Total Time Calculated 15 min   Encounter    Type Initial Screen/Assessment   Assessment/Intervention/Outcome   Assessment Calm;Coping   Intervention Active listening;Discussed illness injury and its impact; Explored/Affirmed feelings, thoughts, concerns;Explored Coping Skills/Resources   Outcome Engaged in conversation;Expressed feelings, needs, and concerns     Electronically signed by Rosalina Chen on 8/25/2022 at 3:00 AM

## 2022-08-25 NOTE — ED NOTES
[address]  [address]  Dept: [phone]  Dept Fax: [phone]  Loc: [phone]     Visit Date:  12/7/2020    Patient:  Faye Corrales  YOB: 1951    HPI:   Faye Corrales presents today for   Chief Complaint   Patient presents with    Concern For COVID-19     patient started running a fever fever that was 102. she has had a cough and body aches that come and go. .    AUDIO/VIDEO CALL DUE TO COVID 23   HPI 49-year-old female is calling regarding signs and symptoms concerning for COVID-19. Patient reports Friday evening she started spiking fever around 102 °F and she has been having severe back pain/pain around her shoulder blade area as well as decreased appetite. She reports dry cough frontal headaches. No Covid related exposures but she does report that she has been stepping out of the house and would like to get testing done for Covid today. No chest tightness or shortness of breath or difficulty breathing or any cyanosis related symptoms concerning for impending respiratory failure. No history of DVT/PE or blood clots. No flank pain. No GI/ related concerns. Medications  Prior to Visit Medications    Medication Sig Taking?  Authorizing Provider   traZODone (DESYREL) 50 MG tablet Take 1 tablet by mouth nightly Yes Estrellita Fairbanks MD   ramipril (ALTACE) 10 MG capsule Take 1 capsule by mouth daily Yes Estrellita Fairbanks MD   fluticasone (FLONASE SENSIMIST) 27.5 MCG/SPRAY nasal spray 2 sprays by Each Nostril route daily  Patient taking differently: 2 sprays by Each Nostril route daily as needed  Yes Estrellita Fairbanks MD   Calcium Carb-Cholecalciferol (CALCIUM 600 + D) 600-200 MG-UNIT TABS Take by mouth daily Yes Historical Provider, MD   aspirin 81 MG EC tablet Take 81 mg by mouth daily Yes Historical Provider, MD        Allergies:  is allergic to fosamax The following labs labeled with pt sticker and tubed to lab:     [x] Blue     [x] Lavender   [] on ice  [x] Green/yellow  [x] Green/black [] on ice  [] Ramona Lucks  [] on ice  [] Yellow  [] Red  [] Pink  [] VBG  [] VBG    [] COVID-19 swab    [] Rapid  [] PCR  [] Flu swab  [] Peds Viral Panel     [] Urine Sample  [] Pelvic Cultures  [] Blood Cultures   [] STREP Cultures         Paul Welsh RN  08/24/22 1773 [alendronate]. Past Medical History:   has a past medical history of Aortic stenosis, Essential hypertension, Heart murmur, systolic, History of aortic valve replacement with bioprosthetic valve, Impaired fasting glucose, Kidney stones, LVH (left ventricular hypertrophy), Osteopenia, and Osteoporosis. Past Surgical History   has a past surgical history that includes Tubal ligation; hernia repair (Left, 2005); Colonoscopy (2005); Colonoscopy (2015); Lithotripsy (12/2006); and Aortic valve replacement (02/2009). Family History  family history includes Asthma in her maternal uncle and mother; Coronary Art Dis in her father; High Blood Pressure in her mother; Other in her maternal uncle; Stroke in her father. Social History   reports that she has never smoked. She has never used smokeless tobacco. She reports that she does not drink alcohol or use drugs. Health Maintenance:    Health Maintenance   Topic Date Due    A1C test (Diabetic or Prediabetic)  07/01/1961    Annual Wellness Visit (AWV)  09/29/2021    Potassium monitoring  10/01/2021    Creatinine monitoring  10/01/2021    DTaP/Tdap/Td vaccine (4 - Td) 06/27/2022    Breast cancer screen  09/23/2022    Lipid screen  09/27/2023    Colon cancer screen colonoscopy  10/27/2025    DEXA (modify frequency per FRAX score)  Completed    Flu vaccine  Completed    Shingles Vaccine  Completed    Pneumococcal 65+ years Vaccine  Completed    Hepatitis C screen  Completed    Hepatitis A vaccine  Aged Out    Hepatitis B vaccine  Aged Out    Hib vaccine  Aged Out    Meningococcal (ACWY) vaccine  Aged Out       Subjective:      Review of Systems   Constitutional: Negative for fatigue, fever and unexpected weight change. HENT: Positive for sinus pressure and sinus pain. Negative for ear pain, postnasal drip, rhinorrhea, sore throat and trouble swallowing. Eyes: Negative for visual disturbance. Respiratory: Positive for cough.  Negative for chest tightness and shortness of breath. Cardiovascular: Negative for chest pain and leg swelling. Gastrointestinal: Negative for abdominal pain, blood in stool and diarrhea. Endocrine: Negative for polyuria. Genitourinary: Negative for difficulty urinating and flank pain. Musculoskeletal: Positive for arthralgias and myalgias. Negative for joint swelling. Skin: Negative for rash. Allergic/Immunologic: Negative for environmental allergies. Neurological: Positive for headaches. Negative for weakness, light-headedness and numbness. Hematological: Negative for adenopathy. Psychiatric/Behavioral: Negative for behavioral problems and suicidal ideas. The patient is not nervous/anxious. Objective: There were no vitals taken for this visit. Physical Exam        Assessment       Diagnosis Orders   1. COVID-19  COVID-19 Ambulatory   2. Fever, unspecified fever cause  COVID-19 Ambulatory   3. Cough  COVID-19 Ambulatory         PLAN   COVID 19 testing ordered. Encourage to do self quarantine. Steps to help prevent the spread of COVID-19 if you are sick  SOURCE - [email or web address]-carlton.info/. html     Stay home except to get medical care   ; Stay home: People who are mildly ill with COVID-19 are able to isolate at home during their illness. You should restrict activities outside your home, except for getting medical care.   ; Avoid public areas: Do not go to work, school, or public areas.   ; Avoid public transportation: Avoid using public transportation, ride-sharing, or taxis.  ; Separate yourself from other people and animals in your home   ; Stay away from others: As much as possible, you should stay in a specific room and away from other people in your home. Also, you should use a separate bathroom, if available.   ; Limit contact with pets & animals:  You should restrict contact with pets and other animals while you are sick with COVID-19, just like you would around other people. Although there have not been reports of pets or other animals becoming sick with COVID-19, it is still recommended that people sick with COVID-19 limit contact with animals until more information is known about the virus. ; When possible, have another member of your household care for your animals while you are sick. If you are sick with COVID-19, avoid contact with your pet, including petting, snuggling, being kissed or licked, and sharing food. If you must care for your pet or be around animals while you are sick, wash your hands before and after you interact with pets and wear a facemask. See COVID-19 and Animals for more information. Other considerations   The ill person should eat/be fed in their room if possible. Non-disposable  items used should be handled with gloves and washed with hot water or in a . Clean hands after handling used  items.  If possible, dedicate a lined trash can for the ill person. Use gloves when removing garbage bags, handling, and disposing of trash. Wash hands after handling or disposing of trash.  Consider consulting with your local health department about trash disposal guidance if available. Information for Household Members and Caregivers of Someone who is Sick   Call ahead before visiting your doctor   Call ahead: If you have a medical appointment, call the healthcare provider and tell them that you have or may have COVID-19. This will help the healthcare provider's office take steps to keep other people from getting infected or exposed. Wear a facemask if you are sick   ; If you are sick: You should wear a facemask when you are around other people (e.g., sharing a room or vehicle) or pets and before you enter a healthcare provider's office.    ; If you are caring for others: If the person who is sick is not able to wear a facemask (for example, because it causes trouble breathing), then people who live with the person who is sick should not stay in the same room with them, or they should wear a facemask if they enter a room with the person who is sick. Cover your coughs and sneezes   ; Cover: Cover your mouth and nose with a tissue when you cough or sneeze.   ; Dispose: Throw used tissues in a lined trash can.   ; Wash hands: Immediately wash your hands with soap and water for at least 20 seconds or, if soap and water are not available, clean your hands with an alcohol-based hand  that contains at least 60% alcohol. Clean your hands often   ; Wash hands: Wash your hands often with soap and water for at least 20 seconds, especially after blowing your nose, coughing, or sneezing; going to the bathroom; and before eating or preparing food.   ; Hand : If soap and water are not readily available, use an alcohol-based hand  with at least 60% alcohol, covering all surfaces of your hands and rubbing them together until they feel dry.   ; Soap and water: Soap and water are the best option if hands are visibly dirty.   ; Avoid touching: Avoid touching your eyes, nose, and mouth with unwashed hands. Handwashing Tips   ; Wet your hands with clean, running water (warm or cold), turn off the tap, and apply soap.  ; Lather your hands by rubbing them together with the soap. Lather the backs of your hands, between your fingers, and under your nails. ; Scrub your hands for at least 20 seconds. Need a timer? Hum the [place] from beginning to end twice.  ; Rinse your hands well under clean, running water.  ; Dry your hands using a clean towel or air dry them. Avoid sharing personal household items   ; Do not share: You should not share dishes, drinking glasses, cups, eating utensils, towels, or bedding with other people or pets in your home.   ; Wash thoroughly after use: After using these items, they should be washed thoroughly with soap and water.   Clean all high-touch surfaces everyday   ; Clean and disinfect: Practice routine cleaning of high touch surfaces.  ; High touch surfaces include counters, tabletops, doorknobs, bathroom fixtures, toilets, phones, keyboards, tablets, and bedside tables.  ; Disinfect areas with bodily fluids: Also, clean any surfaces that may have blood, stool, or body fluids on them.   ; Household : Use a household cleaning spray or wipe, according to the label instructions. Labels contain instructions for safe and effective use of the cleaning product including precautions you should take when applying the product, such as wearing gloves and making sure you have good ventilation during use of the product. Monitor your symptoms   Seek medical attention: Seek prompt medical attention if your illness is worsening     (e.g., difficulty breathing).   ; Call your doctor: Before seeking care, call your healthcare provider and tell them that you have, or are being evaluated for, COVID-19.   ; Wear a facemask when sick: Put on a facemask before you enter the facility. These steps will help the healthcare provider's office to keep other people in the office or waiting room from getting infected or exposed. ; Alert health department: Ask your healthcare provider to call the local or state health department. Persons who are placed under active monitoring or facilitated self-monitoring should follow instructions provided by their local health department or occupational health professionals, as appropriate.  ; Call 911 if you have a medical emergency: If you have a medical emergency and need to call 911, notify the dispatch personnel that you have, or are being evaluated for COVID-19. If possible, put on a facemask before emergency medical services arrive. Osvaldo Wolf is a 68 y.o. female being evaluated by a Virtual Visit (video visit) encounter to address concerns as mentioned above. A caregiver was present when appropriate.  Due to this being a TeleHealth encounter (During [id number]-82 public health emergency), evaluation of the following organ systems was limited: Vitals/Constitutional/EENT/Resp/CV/GI//MS/Neuro/Skin/Heme-Lymph-Imm. Pursuant to the emergency declaration under the [facility], [address] [place] [address] and the [employer] Act, this Virtual Visit was conducted with patient's (and/or legal guardian's) consent, to reduce the patient's risk of exposure to COVID-19 and provide necessary medical care. The patient (and/or legal guardian) has also been advised to contact this office for worsening conditions or problems, and seek emergency medical treatment and/or call 911 if deemed necessary. Patient identification was verified at the start of the visit: Yes    Total time spent for this encounter: 30 mins    Services were provided through a video synchronous discussion virtually to substitute for in-person clinic visit. Patient and provider were located at their individual homes. --Jensen Lester MD on 12/9/2020 at 12:23 PM    An electronic signature was used to authenticate this note. Orders Placed This Encounter   Procedures    COVID-19 Ambulatory     Standing Status:   Future     Number of Occurrences:   1     Standing Expiration Date:   12/7/2021     Scheduling Instructions:      Saline media preferred given current shortage of viral transport media but both acceptable     Order Specific Question:   Is this test for diagnosis or screening? Answer:   Diagnosis of ill patient     Order Specific Question:   Symptomatic for COVID-19 as defined by CDC? Answer:   Yes     Order Specific Question:   Date of Symptom Onset     Answer:   12/4/2020     Order Specific Question:   Hospitalized for COVID-19? Answer:   No     Order Specific Question:   Admitted to ICU for COVID-19?      Answer:   No     Order Specific Question:   Employed in healthcare setting? Answer:   No     Order Specific Question:   Resident in a congregate (group) care setting? Answer:   No     Order Specific Question:   Pregnant? Answer:   No     Order Specific Question:   Previously tested for COVID-19? Answer:   No        No follow-ups on file. Patient given educational materials - see patient instructions. Discussed use, benefit, and side effects of prescribed medications. All patient questions answered. Pt voiced understanding. Reviewed health maintenance.        Electronically signed Arturo Lord MD on 12/7/2020 at 9:58 AM EST

## 2022-08-25 NOTE — PROGRESS NOTES
Obstetric/Gynecology Resident Interval Note    Patient seen and reevaluated. Patient reports abdominal pain is still marginally present however not as severe as when she came in to the ED. Abdominal exam non-peritoneal. patient's vitals have improved and patient has not had a fever since 2022 at 2344. Patient's heart rate is regular and her blood pressure is normotensive. Discussed with patient R/B/A of staying in the hospital versus going home. Patient states she would be more comfortable resting at home. At this time symptoms may be attributable to COVID versus PID. We will plan to discharge patient home with Rx for PID (doxycycline x14 days plus Flagyl x14 days). Recommended patient have close follow-up as she may require further evaluation and surgical investigation in the future, however, reassured by patient stable vitals, improving symptoms, and unremarkable imaging. Offered patient Paxlovid for COVID, patient declined. Strict return precautions discussed with patient. She is to return for worsening abdominal pain, vaginal bleeding, lightheadedness, return of fever. Internal medicine was consulted, and did not object to a discharge when primary team felt appropriate. Patient stable for discharge. Rx so given for Tereza 10 tablets for breakthrough pain, ibuprofen, and Zofran. Discussed plan with Dr. Kyle Duenas. Ora Lawson DO  OB/GYN Resident, PGY3  [facility]  2022, 12:06 PM    Date: 2022  Time: 8:28 AM      Patient Name: Ewa Rubio  Patient : 2000  Room/Bed:   Admission Date/Time: 2022 10:07 PM        Attending Physician Statement  I have discussed the care of Ewa Rubio, including pertinent history and exam findings with the resident. I have reviewed and edited their note in the electronic medical record. The key elements of all parts of the encounter have been performed/reviewed by me .   I agree with the assessment, plan and orders as documented by the resident. The level of care submitted represents to the best of my ability the care documented in the medical record today. GC Modifier. This service has been performed in part by a resident under the direction of a teaching physician. Agree with and appreciate above note.      Attending's Name:  José Obregon DO

## 2022-08-25 NOTE — CONSULTS
[facility]    Patient Name: James Matias     Patient : 2000  Room/Bed:   Admission Date/Time: 2022 10:07 PM  Primary Care Physician: Rex Cash MD    Consulting Provider: Dia Lyon  Reason for Consult: \"PID meeting SIRS admission query\"    CC:   Chief Complaint   Patient presents with    Pelvic Pain    Back Pain    Fever              HPI: James Matias is a 19 y.o. female [id number] presents to the ED with complaints of bilateral groin pain for one week. She state that pain is non radiating today. Patient was recently seen in the ED on  for this same pain. Patient states that pain is constantly present. She denies any recent trauma. She denies any abdominal, vaginal, rectal pain or abnormal vaginal discharge. Her Gc/C that was collected during admission on  was positive for Chlamydia and patient was unaware of positive results. She reports that her last sexual encounter was one month ago. Patient has tried motrin, tylenol and flexeril with minimal relief of pain. CBC, BMP, Vag, HCG on  unremarkable. Additionally, during admission on , patient received an extensive workup for her pain which included a TVUS, Hip X-ray, lower extremity doppler and CT abd/pelvis. The TVUS, lower extremity doppler and Hip Xrays were unremarkable for any abnormalities. CT abdomen pelvis did show a 1.3 cm cyst, however, TVUS did not visualize this. Repeat labs today again showed a normal CBC, CMP, lactate and TSH. UA not suspicious for UTI. Ucx pending. On presentation, patient was tested for COVID-19 infection and was found to be positive. She was also noted to be significantly tachycardic to 150s and febrile with highest recorded temperature of 102.4. She received a fluid bolus in the ED and a dose of Fentanyl 50 x1, Toradol 30 IV x1 and Tylenol 650 mg PO x1 for her pain. CXR obtained today also negative for any intrapulmonary processes.  Patient's last menstrual period was 2022 (exact date). REVIEW OF SYSTEMS:   A minimum of an eleven point review of systems was completed. Constitutional: negative fever, negative chills  HEENT: negative visual disturbances, negative headaches  Respiratory: negative dyspnea, negative cough  Cardiovascular: negative chest pain,  negative palpitations  Gastrointestinal: negative abdominal pain, negative RUQ pain, negative N/V, negative diarrhea, negative constipation  Genitourinary: negative dysuria, negative vaginal discharge, negative vaginal bleeding, + groin pain bilaterally  Dermatological: negative rash  Hematologic: negative bruising  Immunologic/Lymphatic: negative recent illness, negative recent sick contact  Musculoskeletal: negative back pain, negative myalgias, negative arthralgias  Neurological:  negative dizziness, negative weakness  Behavior/Psych: negative depression, negative anxiety    OBSTETRIC HISTORY:   OB History    Para Term  AB Living   6 2 2 0 3 2   SAB IAB Ectopic Molar Multiple Live Births   1 0 0 0 0 2      # Outcome Date GA Lbr Jean-Pierre/2nd Weight Sex Delivery Anes PTL Lv   6             5 AB 2020           4 SAB 2019           3 AB 2018           2 Term 17 38w6d 13:39 / 00:28 6 lb 9.3 oz (2.985 kg) F Vag-Spont None N GIGI      Apgar1: 9  Apgar5: 9   1 Term 13 40w0d  6 lb 8 oz (2.948 kg) F    GIGI     PAST MEDICAL HISTORY:   has a past medical history of Anxiety, Depression, HSV-2 infection, LGSIL of cervix of undetermined significance, LGSIL of cervix of undetermined significance, Pelvic inflammatory disease, and Psychiatric problem. PAST SURGICAL HISTORY:   has a past surgical history that includes fracture surgery (Left); Tympanostomy tube placement; Adenoidectomy; and Appendectomy.     ALLERGIES:  Allergies as of 2022    (No Known Allergies)     MEDICATIONS:  Current Facility-Administered Medications   Medication Dose Route Frequency Provider Last Rate Last Admin    0.9 % sodium chloride bolus  30 mL/kg IntraVENous Once Carmen Bazzi MD 2,586 mL/hr at 08/24/22 2308 2,586 mL at 08/24/22 2308    cefTRIAXone (ROCEPHIN) 1,000 mg in sodium chloride 0.9 % 50 mL IVPB mini-bag  1,000 mg IntraVENous Once Carmen Bazzi  mL/hr at 08/24/22 2343 1,000 mg at 08/24/22 2343     Current Outpatient Medications   Medication Sig Dispense Refill    cyclobenzaprine (FLEXERIL) 10 MG tablet Take 1 tablet by mouth nightly as needed for Muscle spasms 10 tablet 0    cefdinir (OMNICEF) 300 MG capsule TAKE ONE CAPSULE BY MOUTH TWICE DAILY      phenazopyridine (PYRIDIUM) 100 MG tablet TAKE TWO TABLETS BY MOUTH THREE TIMES DAILY AS NEEDED      gabapentin (NEURONTIN) 300 MG capsule Take 2 capsules by mouth 3 times daily for 30 days. 180 capsule 0    amitriptyline (ELAVIL) 25 MG tablet Take 1 tablet by mouth nightly 30 tablet 0    polyethylene glycol (GLYCOLAX) 17 g packet Take 17 g by mouth daily as needed for Constipation (Patient not taking: Reported on 2/10/2022)       FAMILY HISTORY:  Family History of Breast, Ovarian, Colon or Uterine Cancer: No   family history includes No Known Problems in her father and mother. SOCIAL HISTORY:   reports that she has never smoked. She has never used smokeless tobacco. She reports current alcohol use. She reports that she does not use drugs. VITALS:  Vitals:    08/25/22 0101 08/25/22 0104 08/25/22 0112 08/25/22 0156   BP:       Pulse: (!) 103 98 95    Resp: 20 17 20    Temp:    98.1 °F (36.7 °C)   TempSrc:    Oral   SpO2: 97% 96% 96%    Weight:       Height:                         INPUT/OUTPUT:  No intake/output data recorded. No intake/output data recorded.                                                                                                                                PHYSICAL EXAM:     General appearance:  no apparent distress, alert, and cooperative  HEENT: head atraumatic, normocephalic, moist mucous membranes, trachea midline  Neurologic:  alert, oriented, normal speech, no focal findings or movement disorder noted  Lungs:  No increased work of breathing, good air exchange, clear to auscultation bilaterally, no crackles or wheezing  Heart:  regular rate and rhythm and no murmur    Abdomen:  soft, and non-tender in all quadrants, no guarding, rebound or rigidity  Extremities:  no calf tenderness, non edematous   Musculoskeletal: Gross strength equal and intact throughout, no gross abnormalities, range of motion normal in hips, knees, shoulders and spine  Psychiatric: Mood appropriate, normal affect   Rectal Exam: not indicated  Pelvic Exam:   Chaperone for Intimate Exam: Chaperone was present for entire exam, Chaperone Name: ED, RN  External genitalia: General appearance; normal, Hair distribution; normal, Lesions absent, tenderness on palpation of bilateral groin, NO crepitus or erythema noted  SSE: normal appearing external genitalia, normal appearing urethral meatus, normal appearing vaginal mucosa without prolapse, normal appearing cervix without lesions or lacerations, cervical os visually closed, normal physiologic discharge present, no vaginal bleeding noted   Bimanual exam: no adnexal, fundal or cervical motion tenderness    LAB RESULTS:  Results for orders placed or performed during the hospital encounter of 08/24/22   Culture, Blood 1    Specimen: Blood   Result Value Ref Range    Specimen Description . BLOOD     Special Requests LT ACUB 10ML     Culture NO GROWTH <24 HRS    Culture, Blood 1    Specimen: Blood   Result Value Ref Range    Specimen Description . BLOOD     Special Requests RT ACUB 10ML     Culture NO GROWTH <24 HRS    COVID-19, Rapid    Specimen: Nasopharyngeal Swab   Result Value Ref Range    Specimen Description . NASOPHARYNGEAL SWAB     SARS-CoV-2, Rapid DETECTED (A) Not Detected   CBC with Auto Differential   Result Value Ref Range    WBC 9.6 3.5 - 11.3 k/uL    RBC 4.46 3.95 - 5.11 m/uL    Hemoglobin 14.2 11.9 - 15.1 g/dL    Hematocrit 41.9 36.3 - 47.1 %    MCV 93.9 82.6 - 102.9 fL    MCH 31.8 25.2 - 33.5 pg    MCHC 33.9 28.4 - 34.8 g/dL    RDW 12.7 11.8 - 14.4 %    Platelets 355 220 - 904 k/uL    MPV 10.7 8.1 - 13.5 fL    NRBC Automated 0.0 0.0 per 100 WBC    Seg Neutrophils 69 (H) 36 - 65 %    Lymphocytes 22 (L) 24 - 43 %    Monocytes 7 3 - 12 %    Eosinophils % 1 1 - 4 %    Basophils 1 0 - 2 %    Immature Granulocytes 0 0 %    Segs Absolute 6.68 1.50 - 8.10 k/uL    Absolute Lymph # 2.09 1.10 - 3.70 k/uL    Absolute Mono # 0.69 0.10 - 1.20 k/uL    Absolute Eos # 0.06 0.00 - 0.44 k/uL    Basophils Absolute 0.05 0.00 - 0.20 k/uL    Absolute Immature Granulocyte 0.04 0.00 - 0.30 k/uL   Comprehensive Metabolic Panel   Result Value Ref Range    Glucose 124 (H) 70 - 99 mg/dL    BUN 7 6 - 20 mg/dL    Creatinine 0.81 0.50 - 0.90 mg/dL    Calcium 8.8 8.6 - 10.4 mg/dL    Sodium 135 135 - 144 mmol/L    Potassium 3.9 3.7 - 5.3 mmol/L    Chloride 100 98 - 107 mmol/L    CO2 26 20 - 31 mmol/L    Anion Gap 9 9 - 17 mmol/L    Alkaline Phosphatase 68 35 - 104 U/L    ALT 18 5 - 33 U/L    AST 19 <32 U/L    Total Bilirubin 0.55 0.3 - 1.2 mg/dL    Total Protein 6.3 (L) 6.4 - 8.3 g/dL    Albumin 3.6 3.5 - 5.2 g/dL    Albumin/Globulin Ratio 1.3 1.0 - 2.5    GFR Non-African American >60 >60 mL/min    GFR African American >60 >60 mL/min    GFR Comment         Lactate, Sepsis   Result Value Ref Range    Lactic Acid, Sepsis, Whole Blood 1.2 0.5 - 1.9 mmol/L   TSH with Reflex   Result Value Ref Range    TSH 0.90 0.30 - 5.00 uIU/mL     DIAGNOSTICS:  US NON OB TRANSVAGINAL    Result Date: 8/22/2022  EXAMINATION: PELVIC ULTRASOUND; ULTRASOUND OF THE PELVIS WITH COLOR DOPPLER FLOW EVALUATION 8/22/2022 TECHNIQUE: Transvaginal pelvic ultrasound duplex ultrasound using B-mode/gray scaled imaging, Doppler spectral analysis and color flow Doppler was obtained.  COMPARISON: CT abdomen pelvis from 01/14/2022 HISTORY: ORDERING SYSTEM PROVIDED HISTORY: r/o torsion TECHNOLOGIST PROVIDED HISTORY: r/o torsion 49-year-old female with spotting; rule out torsion FINDINGS: Measurements: Uterus: 8.8 x 3.4 x 5.1 cm. Endometrial stripe: 8 mm. Right Ovary:3.7 x 2.3 x 2.1 cm. Left Ovary: 3.6 x 2.3 x 2.0 cm. Ultrasound Findings: Patient's LMP is 08/18/2022. Uterus: Uterus demonstrates normal myometrial echotexture. Endometrial stripe: Endometrial stripe is within normal limits. Nabothian cysts in the cervix. Right Ovary: Right ovary is within normal limits. There is normal arterial and venous Doppler flow. Left Ovary:  Left ovary is within normal limits. There is normal arterial and venous Doppler flow. Bilateral ovarian follicles. Free Fluid: No evidence of free fluid. 1. Bilateral ovarian follicles. Normal arterial and venous Doppler flow in association with the ovaries. 2. Nabothian cysts in the cervix. 3. Endometrial stripe thickness measures 8 mm, within normal limits. RECOMMENDATIONS: Small ovarian follicle, normal finding. No follow-up imaging is recommended. Reference: Radiology 2019 Nov;293(2):359-371     CT ABDOMEN PELVIS W IV CONTRAST Additional Contrast? None    Result Date: 8/22/2022  EXAMINATION: CT OF THE ABDOMEN AND PELVIS WITH CONTRAST 8/22/2022 7:11 pm TECHNIQUE: CT of the abdomen and pelvis was performed with the administration of intravenous contrast. Multiplanar reformatted images are provided for review. Automated exposure control, iterative reconstruction, and/or weight based adjustment of the mA/kV was utilized to reduce the radiation dose to as low as reasonably achievable.  COMPARISON: 01/14/2022 HISTORY: ORDERING SYSTEM PROVIDED HISTORY: abdominal/pelvic pain TECHNOLOGIST PROVIDED HISTORY: abdominal/pelvic pain Decision Support Exception - unselect if not a suspected or confirmed emergency medical condition->Emergency Medical Condition (MA) Reason for Exam: abdominal/pelvic pain FINDINGS: Lower Chest: Subsegmental atelectasis in the lung bases. Organs: No acute abnormality within the liver, gallbladder, spleen, pancreas, or adrenal glands. Nonobstructing left nephrolithiasis. No hydronephrosis. GI/Bowel: Stomach is partially distended. Small bowel is nondilated. The colon is nondilated. Pelvis: Bladder is partially distended without vesicular stone. The uterus is present. There is endometrial fluid, likely physiologic. 1.3 cm right adnexal cyst. Peritoneum/Retroperitoneum: Trace free fluid in the pelvis. No pneumoperitoneum. Abdominal aorta is normal in caliber. There are enlarged bilateral inguinal lymph nodes. Bones/Soft Tissues: No acute osseous abnormality. 1. No acute intra-abdominal abnormality. 2. 1.3 cm right adnexal cyst. RECOMMENDATIONS: 1.3 cm simple Pathology: 1.3 cm right adnexal simple-appearing cyst. No follow-up imaging is recommended. Reference: JACR 2020 Feb;17(2):248-254     XR CHEST PORTABLE    Result Date: 8/24/2022  EXAMINATION: ONE XRAY VIEW OF THE CHEST 8/24/2022 11:11 pm COMPARISON: 01/14/2022 HISTORY: ORDERING SYSTEM PROVIDED HISTORY: sepsis TECHNOLOGIST PROVIDED HISTORY: sepsis Reason for Exam: sepsis   upright port FINDINGS: Cardiomediastinal silhouette is normal in size. There is no pleural effusion or pneumothorax. There is no pulmonary consolidation. There is no acute osseous abnormality. No acute cardiopulmonary abnormality.      VL DUP LOWER EXTREMITY VENOUS BILATERAL    Result Date: 8/22/2022    [facility]  Vascular Lower Extremities DVT Study Procedure   Patient Name     Ambrose Dyson  Date of Study             08/22/2022                   R   Date of Birth    2000  Gender                    Female   Age              19 year(s)  Race                         Room Number      45   Corporate ID #   [id number]   Patient Acct #   [de-identified]   MR #             [id number]     Sonographer               Sylvia Woody RVT   Accession # [id number]  Interpreting Physician    Tonya Ta   Referring Nurse              Referring Physician       ER MD *  Practitioner  Procedure Type of Study:   Veins: Lower Extremities DVT Study, Venous Scan Lower Bilateral.  Indications for Study:R/O DVT. Patient Status:ER. Conclusions   Summary   Simultaneous real time imaging utilizing B-Mode, color doppler and  spectral waveform analysis was performed on the bilateral lower  extremities for venous examination of the deep and superficial systems. Findings are:   Right:  Technically limited study as stated above however in the visualized areas  no superficial or deep vein thrombosis was identified. Left:  Technically limited study as stated above however in the visualized areas  no superficial or deep vein thrombosis was identified. Signature   ----------------------------------------------------------------  Electronically signed by Carley Kirk RVT(Sonographer) on  08/22/2022 04:25 PM  ----------------------------------------------------------------   ----------------------------------------------------------------  Electronically signed by Lefty Allen(Interpreting  physician) on 08/22/2022 06:54 PM  ----------------------------------------------------------------  Findings:   Right Impression:                    Left Impression: The femoral, popliteal and tibial    The femoral, popliteal and tibial  veins demonstrate normal             veins demonstrate normal  compressibility and augmentation. compressibility and augmentation. The  The common femoral vein maintains    common femoral vein maintains color  color flow and dopplers. Patient     flow and dopplers. Patient could not  could not tolerate compression. tolerate compression. Normal  Normal compressibility of the great  compressibility of the great  saphenous vein. Normal               saphenous vein.  Normal  compressibility of the small         compressibility of the small !None      ! +------------------------------------+----------+---------------+----------+ ! GSV Thigh                           ! Yes       ! Yes            ! None      ! +------------------------------------+----------+---------------+----------+ ! GSV Knee                            ! Yes       ! Yes            ! None      ! +------------------------------------+----------+---------------+----------+ ! GSV Ankle                           ! Yes       ! Yes            ! None      ! +------------------------------------+----------+---------------+----------+ ! SSV                                 ! Yes       ! Yes            ! None      ! +------------------------------------+----------+---------------+----------+ Right Doppler Measurements +---------------------------+------+------+--------------------------------+ ! Location                   ! Signal!Reflux! Reflux (msec)                   ! +---------------------------+------+------+--------------------------------+ ! Common Femoral             !Phasic!      !                                ! +---------------------------+------+------+--------------------------------+ ! Prox Femoral               !Phasic!      !                                ! +---------------------------+------+------+--------------------------------+ ! Popliteal                  !Phasic!      !                                ! +---------------------------+------+------+--------------------------------+ Left Lower Extremities DVT Study Measurements Left 2D Measurements +------------------------------------+----------+---------------+----------+ ! Location                            ! Visualized! Compressibility! Thrombosis! +------------------------------------+----------+---------------+----------+ ! Common Femoral                      !Yes       ! No             !None      ! +------------------------------------+----------+---------------+----------+ ! Prox Femoral                        !Yes       ! Yes !None      ! +------------------------------------+----------+---------------+----------+ ! Mid Femoral                         !Yes       ! Yes            ! None      ! +------------------------------------+----------+---------------+----------+ ! Dist Femoral                        !Yes       ! Yes            ! None      ! +------------------------------------+----------+---------------+----------+ ! Deep Femoral                        !Yes       ! Yes            ! None      ! +------------------------------------+----------+---------------+----------+ ! Popliteal                           !Yes       ! Yes            ! None      ! +------------------------------------+----------+---------------+----------+ ! Sapheno Femoral Junction            ! Yes       ! Yes            ! None      ! +------------------------------------+----------+---------------+----------+ ! PTV                                 ! Yes       ! Yes            ! None      ! +------------------------------------+----------+---------------+----------+ ! Peroneal                            !Yes       ! Yes            ! None      ! +------------------------------------+----------+---------------+----------+ ! Gastroc                             ! Yes       ! Yes            ! None      ! +------------------------------------+----------+---------------+----------+ ! GSV Thigh                           ! Yes       ! Yes            ! None      ! +------------------------------------+----------+---------------+----------+ ! GSV Knee                            ! Yes       ! Yes            ! None      ! +------------------------------------+----------+---------------+----------+ ! GSV Ankle                           ! Yes       ! Yes            ! None      ! +------------------------------------+----------+---------------+----------+ ! SSV                                 ! Yes       ! Yes            ! None      ! +------------------------------------+----------+---------------+----------+ Left ovaries. 2. Nabothian cysts in the cervix. 3. Endometrial stripe thickness measures 8 mm, within normal limits. RECOMMENDATIONS: Small ovarian follicle, normal finding. No follow-up imaging is recommended. Reference: Radiology 2019 Nov;293(2):359-371     XR HIP 2-3 VW W PELVIS RIGHT    Result Date: 8/22/2022  EXAMINATION: ONE XRAY VIEW OF THE PELVIS AND TWO XRAY VIEWS RIGHT HIP 8/22/2022 1:31 pm COMPARISON: None. HISTORY: ORDERING SYSTEM PROVIDED HISTORY: hip/thigh pain TECHNOLOGIST PROVIDED HISTORY: hip/thigh pain Reason for Exam: no injury, FINDINGS: The hip demonstrates normal alignment. No evidence of acute fracture. No focal osseus lesion. Pelvis is intact. No acute abnormality of the hip. ASSESSMENT & PLAN:    Lilliana Wasserman is a 19 y.o. female [id number] here for assessment of bilateral groin pain   - Febrile, highest recorded temperature of 102.4    - Tachycardic to 120s-150s   - Normotensive   - B/L groin pain present for one week, constant, nonradiating at this time. Unrelieved by Motrin, tylenol and flexeril   - Unremarkable abdominal and pelvic exam. Tenderness present on palpation of bilateral groin. No erythema, lesions or crepitus present on exam   - Seen on 8/22 for similar complaints. Labs completely unremarkable at that time except for Gc/C positive for Chlamydia. TVUS, lower extremity doppler and Hip Xrays were unremarkable for any abnormalities. CT abdomen pelvis did show a 1.3 cm cyst, however, TVUS did not visualize this. - Repeat labs today showed a unremarkable CBC, CMP, lactate and TSH.   - UA not suspicious for UTI. Ucx pending.   - Blood cultures collected and no growth thus far  - On presentation, patient was tested for COVID-19 infection and was found to be positive. - CXR obtained today also negative for any intrapulmonary processes. - S/p fluid bolus in the ED.  Another bolus ordered with plans to run fluids at 150 cc/hr thereafter  - S/p Fentanyl 50 x1, Toradol 30 IV x1 and Attending's Name: Dr. Dima Al MD  Ob/Gyn Resident   [facility]  8/24/2022, 11:55 PM
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
## 2022-08-29 LAB
CULTURE: NORMAL
CULTURE: NORMAL
Lab: NORMAL
Lab: NORMAL
SPECIMEN DESCRIPTION: NORMAL
SPECIMEN DESCRIPTION: NORMAL

## 2023-03-17 ENCOUNTER — HOSPITAL ENCOUNTER (OUTPATIENT)
Age: 23
Setting detail: SPECIMEN
Discharge: HOME | End: 2023-03-17

## 2023-03-20 LAB
C TRACH DNA SPEC QL PROBE+SIG AMP: NEGATIVE
N GONORRHOEA DNA SPEC QL PROBE+SIG AMP: NEGATIVE
SPECIMEN DESCRIPTION: NORMAL

## 2024-09-20 NOTE — ED NOTES
Report given to [facility]. All questions comments and concerns addressed.       Josiah Medina, JJ  01/14/22 0275 abdominal pain